# Patient Record
Sex: FEMALE | Race: WHITE | NOT HISPANIC OR LATINO | Employment: UNEMPLOYED | URBAN - METROPOLITAN AREA
[De-identification: names, ages, dates, MRNs, and addresses within clinical notes are randomized per-mention and may not be internally consistent; named-entity substitution may affect disease eponyms.]

---

## 2017-01-05 ENCOUNTER — OFFICE VISIT (OUTPATIENT)
Dept: PSYCHIATRY | Facility: CLINIC | Age: 67
End: 2017-01-05
Payer: COMMERCIAL

## 2017-01-05 VITALS
DIASTOLIC BLOOD PRESSURE: 60 MMHG | BODY MASS INDEX: 19.15 KG/M2 | SYSTOLIC BLOOD PRESSURE: 137 MMHG | WEIGHT: 122 LBS | HEIGHT: 67 IN | HEART RATE: 84 BPM

## 2017-01-05 DIAGNOSIS — F31.75 BIPOLAR I DISORDER, CURRENT OR MOST RECENT EPISODE DEPRESSED, IN PARTIAL REMISSION: ICD-10-CM

## 2017-01-05 PROCEDURE — 1159F MED LIST DOCD IN RCRD: CPT | Mod: S$GLB,,, | Performed by: PSYCHIATRY & NEUROLOGY

## 2017-01-05 PROCEDURE — 99999 PR PBB SHADOW E&M-EST. PATIENT-LVL III: CPT | Mod: PBBFAC,,, | Performed by: PSYCHIATRY & NEUROLOGY

## 2017-01-05 PROCEDURE — 99214 OFFICE O/P EST MOD 30 MIN: CPT | Mod: S$GLB,,, | Performed by: PSYCHIATRY & NEUROLOGY

## 2017-01-05 RX ORDER — LAMOTRIGINE 100 MG/1
100 TABLET ORAL DAILY
Qty: 30 TABLET | Refills: 5 | Status: SHIPPED | OUTPATIENT
Start: 2017-01-05 | End: 2017-01-26 | Stop reason: SDUPTHER

## 2017-01-05 RX ORDER — LAMOTRIGINE 25 MG/1
75 TABLET ORAL DAILY
Qty: 90 TABLET | Refills: 5 | Status: SHIPPED | OUTPATIENT
Start: 2017-01-05 | End: 2017-03-23

## 2017-01-05 RX ORDER — OLANZAPINE 5 MG/1
2.5-5 TABLET ORAL NIGHTLY
Qty: 30 TABLET | Refills: 5 | Status: SHIPPED | OUTPATIENT
Start: 2017-01-05 | End: 2017-03-23

## 2017-01-05 NOTE — MR AVS SNAPSHOT
[facility] - Psychiatry  [address]  [facility] [address]  Phone: [phone]  Fax: [phone]                  Martha Garner   2017 3:30 PM   Office Visit    Description:  Female : 1950   Provider:  Holley Smith MD   Department:  [facility] - Psychiatry           Diagnoses this Visit        Comments    Bipolar disorder, current episode mixed, moderate                To Do List           Goals (5 Years of Data)     None       These Medications        Disp Refills Start End    olanzapine (ZYPREXA) 5 MG tablet 30 tablet 5 2017    Take 0.5-1 tablets (2.5-5 mg total) by mouth every evening. - Oral    lamotrigine (LAMICTAL) 100 MG tablet 30 tablet 5 2017     Take 1 tablet (100 mg total) by mouth once daily. Take together with 25 mg tablets for total of 175 mg daily. - Oral    lamotrigine (LAMICTAL) 25 MG tablet 90 tablet 5 2017    Take 3 tablets (75 mg total) by mouth once daily. Take together with 100 mg tablet for total of 175 mg daily. - Oral      [facility]     [facility] On Call Nurse Care Line -  Assistance  Registered nurses in the [facility] On Call Center provide clinical advisement, health education, appointment booking, and other advisory services.  Call for this free service at [phone].             Medications           Message regarding Medications     Verify the changes and/or additions to your medication regime listed below are the same as discussed with your clinician today.  If any of these changes or additions are incorrect, please notify your healthcare provider.        START taking these NEW medications        Refills    lamotrigine (LAMICTAL) 25 MG tablet 5    Sig: Take 3 tablets (75 mg total) by mouth once daily. Take together with 100 mg tablet for total of 175 mg daily.    Class: Print    Route: Oral      CHANGE how you are taking these medications     Start Taking Instead of    olanzapine (ZYPREXA) 5 MG tablet  "olanzapine (ZYPREXA) 2.5 MG tablet    Dosage:  Take 0.5-1 tablets (2.5-5 mg total) by mouth every evening. Dosage:  Take 1 tablet (2.5 mg total) by mouth every evening. You may take additional 2.5 mg as needed for insomnia or anxiety    Reason for Change:  Reorder     lamotrigine (LAMICTAL) 100 MG tablet lamotrigine (LAMICTAL) 150 MG Tab    Dosage:  Take 1 tablet (100 mg total) by mouth once daily. Take together with 25 mg tablets for total of 175 mg daily. Dosage:  Take 1 tablet daily for 1 week, then increase to 1 1/2 tablets daily.    Reason for Change:  Reorder            Verify that the below list of medications is an accurate representation of the medications you are currently taking.  If none reported, the list may be blank. If incorrect, please contact your healthcare provider. Carry this list with you in case of emergency.           Current Medications     calcium citrate (CALCITRATE) 200 mg (950 mg) tablet Take 3 tablets (600 mg total) by mouth 3 (three) times daily with meals.    clonazePAM (KLONOPIN) 1 MG tablet Take 0.5-1 tablets (0.5-1 mg total) by mouth nightly as needed for Anxiety.    lamotrigine (LAMICTAL) 100 MG tablet Take 1 tablet (100 mg total) by mouth once daily. Take together with 25 mg tablets for total of 175 mg daily.    lamotrigine (LAMICTAL) 25 MG tablet Take 3 tablets (75 mg total) by mouth once daily. Take together with 100 mg tablet for total of 175 mg daily.    lithium (LITHOBID) 300 MG CR tablet Take 2 tablets (600 mg total) by mouth every evening.    olanzapine (ZYPREXA) 5 MG tablet Take 0.5-1 tablets (2.5-5 mg total) by mouth every evening.           Clinical Reference Information           Vital Signs - Last Recorded  Most recent update: 1/5/2017  3:33 PM by Mik Keith    BP Pulse Ht Wt BMI    137/60 84 5' 7" (1.702 m) 55.3 kg (122 lb) 19.11 kg/m2      Blood Pressure          Most Recent Value    BP  137/60      Allergies as of 1/5/2017     No Known Allergies      Immunizations " Administered on Date of Encounter - 1/5/2017     None      [employer] [employer] Sign-Up     Activating your [facility] account is as easy as 1-2-3!     1) Visit [email or web address], select Sign Up Now, enter this activation code and your date of birth, then select Next.  [email or web address]  Expires: 2/19/2017  4:05 PM      2) Create a username and password to use when you visit [facility] in the future and select a security question in case you lose your password and select Next.    3) Enter your e-mail address and click Sign Up!    Additional Information  If you have questions, please e-mail [email or web address] or call [phone] to talk to our [facility] staff. Remember, [facility] is NOT to be used for urgent needs. For medical emergencies, dial 911.         Instructions    1. Continue lamictal 175 mg daily, zyprexa 2.5-5 mg at bedtime, klonopin 0.5 mg at bedtime as needed, lithium 600 mg at bedtime.  2. Return for follow up on 1/26 at 2:30pm.

## 2017-01-05 NOTE — PROGRESS NOTES
Ambulatory Psychiatry Established Patient Follow-up Note      Chief Complaint  presents for followup of depression    Time Spent  30 minutes    People Present  Patient.     HPI  Feeling better. Mood has been good. Had some depression between alvina and new years, on her own increased lamictal to 175 mg daily and zyprexa to 5 mg at bedtime which helped substantially. Spoke with  during the appointment who confirms patient's report. STill has some OCD, but feels these are tolerable. Sleep had been poor last night, after dropping zyprexa back to 2.5 mg at bedtime. No stone or hypomania.     Past trials: risperdal ineffective, lexapro (caused mood lability)    ROS   Complete review of systems performed covering Constitutional, Eyes, ENT/Mouth, Cardiovascular, Respiratory, Gastrointestinal, Genitourinary, Musculoskeletal, Skin, Neurologic, Endocrine, and Allergy/Immune. Intermittent back pain. All other systems were negative.    Psych ROS covered elsewhere in note (HPI)    PFSH  Past Medical History reviewed: Yes  Family History reviewed: No  Social History reviewed: Yes  Medications/problem list/allergies reviewed: Yes    Medications  zyprexa 2.5 mg at bedtime.  Klonopin 0.5 mg qhs  Lithium 600 mg qhs  Lamictal 50 mg daily    Allergies  Review of patient's allergies indicates:  No Known Allergies    EXAM  VITALS     RELEVANT LABS/STUDIES:  Lithium level   Order: [id number]   Status:  Final result Visible to patient:  No (Not Released) Next appt:  None         Ref Range & Units 13d ago  (9/24/16) 3wk ago  (9/10/16) 1mo ago  (9/5/16) 1mo ago  (9/3/16)    Lithium Lvl 0.6 - 1.2 mmol/L 0.7 0.8 0.5 (L) 0.4 (L)   Resulting Agency  [facility]      Specimen Collected: 09/24/16  9:48 AM Last Resulted: 09/24/16 10:25 AM Lab Flowsheet Order Details View Encounter Lab and Collection Details Routing Result History           TSH   Order: [id number]   Status:  Final result Visible to patient:  No (Not Released) Next  appt:  None         Ref Range & Units 13d ago   1mo ago   5yr ago      TSH 0.400 - 4.000 uIU/mL 0.997 0.868 1.02R   Resulting Agency  [facility]      Specimen Collected: 09/24/16  2:05 PM Last Resulted: 09/24/16  3:13 PM Lab Flowsheet Order Details View Encounter Lab and Collection Details Routing Result              PTH, intact   Order: [id number]   Status:  Final result Visible to patient:  No (Not Released) Next appt:  None      Ref Range & Units 13d ago     PTH, Intact 9.0 - 77.0 pg/mL 148.0 (H)   Resulting Agency  [facility]      Specimen Collected: 09/24/16  2:05 PM Last Resulted: 09/24/16  2:58 PM Lab Flowsheet Order Details View Encounter Lab and Collection Details Routing Result History           CBC auto differential   Order: [id number]   Status:  Final result Visible to patient:  No (Not Released) Next appt:  None         Ref Range & Units 12d ago   13d ago   1mo ago   5yr ago      WBC 3.90 - 12.70 K/uL 11.70 15.14 (H) 6.52 4.68 (L)R    RBC 4.00 - 5.40 M/uL 4.00 4.57 4.51 4.22    Hemoglobin 12.0 - 16.0 g/dL 10.1 (L) 11.8 (L) 10.3 (L) 13.0R    Hematocrit 37.0 - 48.5 % 33.1 (L) 37.2 35.4 (L) 37.7    MCV 82 - 98 fL 83 81 (L) 79 (L) 89.3R    MCH 27.0 - 31.0 pg 25.3 (L) 25.8 (L) 22.8 (L) 30.8R    MCHC 32.0 - 36.0 % 30.5 (L) 31.7 (L) 29.1 (L) 34.5R    RDW 11.5 - 14.5 % 21.6 (H) 21.0 (H) 16.7 (H) 12.6    Platelets 150 - 350 K/uL 222 244 276 224    MPV 9.2 - 12.9 fL 10.4 10.1 9.4 10.2    Gran # 1.8 - 7.7 K/uL 9.7 (H) 13.6 (H) 4.6 1.9    Lymph # 1.0 - 4.8 K/uL 1.2 0.7 (L) 1.1 2.0R    Mono # 0.3 - 1.0 K/uL 0.6 0.7 0.6 0.4R    Eos # 0.0 - 0.5 K/uL 0.2 0.0 0.2 0.3R    Baso # 0.00 - 0.20 K/uL 0.03 0.02 0.02 0.0R    Gran% 38.0 - 73.0 % 82.8 (H) 90.1 (H) 70.1 41.4R    Lymph% 18.0 - 48.0 % 10.5 (L) 4.8 (L) 16.9 (L) 42.3R    Mono% 4.0 - 15.0 % 4.9 4.6 9.0 9.4 (H)R    Eosinophil% 0.0 - 8.0 % 1.3 0.1 3.5 6.0 (H)R    Basophil% 0.0 - 1.9 % 0.3 0.1 0.3 0.9R    Differential Method  Automated Automated Automated    Resulting  Agency  [facility]      Specimen Collected: 09/25/16  5:44 AM Last Resulted: 09/25/16  7:26 AM Lab Flowsheet Order Details View Encounter Lab and Collection Details Routing Result History      R=Reference range differs from displayed range             Basic metabolic panel   Order: [id number]   Status:  Final result Visible to patient:  No (Not Released) Next appt:  None         Ref Range & Units 12d ago   13d ago   3wk ago   1mo ago      Sodium 136 - 145 mmol/L 142 141 140 144    Potassium 3.5 - 5.1 mmol/L 3.7 4.0CM 4.1 4.3    Chloride 95 - 110 mmol/L 113 (H) 108 112 (H) 111 (H)    CO2 23 - 29 mmol/L 23 22 (L) 21 (L) 26    Glucose 70 - 110 mg/dL 113 (H) 126 (H) 177 (H) 98    BUN, Bld 8 - 23 mg/dL 15 18 11 20    Creatinine 0.5 - 1.4 mg/dL 0.7 0.8 0.8 0.7    Calcium 8.7 - 10.5 mg/dL 7.9 (L) 8.4 (L) 8.6 (L) 9.1    Anion Gap 8 - 16 mmol/L 6 (L) 11 7 (L) 7 (L)    eGFR if African American >60 mL/min/1.73 m^2 >60.0 >60.0 >60.0 >60.0    eGFR if non African American >60 mL/min/1.73 m^2 >60.0 >60.0CM >60.0CM >60.0CM   Comments: Calculation used to obtain the estimated glomerular filtration   rate (eGFR) is the CKD-EPI equation. Since race is unknown   in our information system, the eGFR values for   -American and Non--American patients are given   for each creatinine result.      Resulting Agency  [facility]      Specimen Collected: 09/25/16  5:44 AM Last Resulted: 09/25/16  6:22 AM Lab Flowsheet Order Details View Encounter Lab and Collection Details Routing Result History      CM=Additional comments               PSYCHIATRIC EXAMINATION  Appearance: well groomed, appearing healthy and of stated age    Behavior: cooperative, pleasant, no psychomotor retardation or agitation  Speech:normal rate, rhythm, volume and amount  Mood:good  Affect:brighter  Thought Process: linear and organized.  Thought Content: negative for delusions or suicidal ideations  Associations: intact  Memory: grossly  intact.  Level of Consciousness/Orientation: grossly intact  Fund of Knowledge: good  Attention: good  Language: fluent, naming intact  Insight: fair  Judgment: fair    Neurological signs: no involuntary movements or tremor  Gait: normal    Medical Decision Making    IMPRESSION   67 yo  retired woman with past psych hx significant for seasonal depressive episodes, anxiety and alcohol use disorder in sustained remission, off antidepressants for past several years, now presenting with one month hx of depressed mood associated with hyperreligious obsessions. Patient admitted to [facility] for treatment of major depressive episode with psychosis and had partial response to combination of lexapro 20 mg and zyprexa 7.5 mg at bedtime. Pt on follow up on 8/11 denied current hyperreligiousity or obsessiveness but still reports depression. Continued on lexapro 20 mg and increased dose of zyprexa 10 mg, pt returned less than one week later with several days of hyperactivity, severe insomnia and restlessness with euthymic mood, on exam affect bright and mildly expansive. On review of remote records, patient with periods of elevated mood possibly coincing with past episodes of substance use, prior diagnosis of bipolar disorder for periods of insomnia and hyperactivity. +Family hx (daughter with bipolar), as well as mixed features to prior depressions (delusions, hyperreligiousity, obsessiveness) consistent with bipolar II disorder. Then reduced lexapro to 10 mg, started on klonopin for sleep and continued zyprexa at 10 mg. Patient returned one week later on 8/25 with lower mood, variable energy but still appearing  mildly agitated, having incongruent affect and hyperreligious on exam. Of note pt had decreased zyprexa fromo 10 mg to 3.75 mg several days before due to concern for mild transaminitis and lower energy. Attempted crosstaper of zyprexa to risperdal but patient resumed zyprexa due to continued extremes mood swings and  insomnia. Continued to taper off lexapro. Patient returned 2 weeks ago dysphoric and suicidal as well as with psychomotor agitation, increased goal directed activity and hyperreligiousity. Concerned for risk to self given severity of despair, expressed desire to die, Worship conviction and agitation. Admitted to [facility], where sx stabilized with addition of lithium. Patient without any further suicidal ideation, delusions or mixed sx, but now reporting apathy and sustained depression, also complaining of some cognitive effects and blurry vision that she ties to increase in lithium dose. Reduced lithium from 750 mg to 600 mg while keeping other meds the same. Pt returned today reporting euthymia and fairly stable mood apart from some overshopping, but states this is achronic impulse not clearly related to mood. Continued patient on lithium 600 mg, zyprexa 10, lexapro 5 and klonopin 0.5-1 for 2 weeks. Per  patient has been stable, however patient reporting some dysphoria. Started patient on lamictal and tapering off zyprexa and lexapro, patient reporting good mood, denying depression or manic sx, appearing euthymic. However patient returned 2 weeks later in mid November with high anxiety, insomnia, suggestive of mixed hypomanic state given high level of activity, mild impulsivity and frequent good but labile mood. Started pateint back on zyprexa 2.5 mg qhs with good effect, calmer and euthymic but still with anxiety, obsessive thoughts and insomnia. Increased lamictal to target anxiety, pt increased to 75 mg only rather than 100 mg due to misunderstanding. Returned recently reporting depression more days than not and ruminative thinking about salvation. Increased lamictal further with resulting improvement in mood.     DIAGNOSES  Bipolar Disorder NOS, most recent episode mixed, in partial remission    PLAN  1. Continue lamictal 175 mg dailyfor bipolar depression. counsled about risk of rash.  2. Continue zyprexa  2.5-5  mg at bedtime for mood stabilization and insomnia. Consider transitioning to latuda or seroquel if still depressed and not responding to lamictal increases.  4. Continue lithium 600 mg qhs. Level in appropriate range. Consider discontinuation in the future  5. Continue klonopin 0.25-1 mg as needed for insomnia. Counseled not to take at same time as tramadol.  6. Continue therapy.  7. Return in 2 weeks     More than 50% of the time was spent on counseling and coordination of care.  Behavioral counseling, supportive therapy, coordination with  on care

## 2017-01-05 NOTE — PATIENT INSTRUCTIONS
1. Continue lamictal 175 mg daily, zyprexa 2.5-5 mg at bedtime, klonopin 0.5 mg at bedtime as needed, lithium 600 mg at bedtime.  2. Return for follow up on 1/26 at 2:30pm.

## 2017-01-07 PROBLEM — F31.62 BIPOLAR DISORDER, CURRENT EPISODE MIXED, MODERATE: Status: ACTIVE | Noted: 2017-01-07

## 2017-01-26 ENCOUNTER — OFFICE VISIT (OUTPATIENT)
Dept: PSYCHIATRY | Facility: CLINIC | Age: 67
End: 2017-01-26
Payer: COMMERCIAL

## 2017-01-26 VITALS
SYSTOLIC BLOOD PRESSURE: 142 MMHG | WEIGHT: 116 LBS | DIASTOLIC BLOOD PRESSURE: 83 MMHG | HEIGHT: 67 IN | BODY MASS INDEX: 18.21 KG/M2 | HEART RATE: 78 BPM

## 2017-01-26 DIAGNOSIS — F31.78 BIPOLAR I DISORDER, MOST RECENT EPISODE MIXED, IN FULL REMISSION: ICD-10-CM

## 2017-01-26 DIAGNOSIS — F31.75 BIPOLAR I DISORDER, CURRENT OR MOST RECENT EPISODE DEPRESSED, IN PARTIAL REMISSION: ICD-10-CM

## 2017-01-26 PROCEDURE — 1159F MED LIST DOCD IN RCRD: CPT | Mod: S$GLB,,, | Performed by: PSYCHIATRY & NEUROLOGY

## 2017-01-26 PROCEDURE — 99214 OFFICE O/P EST MOD 30 MIN: CPT | Mod: S$GLB,,, | Performed by: PSYCHIATRY & NEUROLOGY

## 2017-01-26 PROCEDURE — 99999 PR PBB SHADOW E&M-EST. PATIENT-LVL II: CPT | Mod: PBBFAC,,, | Performed by: PSYCHIATRY & NEUROLOGY

## 2017-01-26 RX ORDER — LAMOTRIGINE 200 MG/1
200 TABLET ORAL DAILY
Qty: 30 TABLET | Refills: 5 | Status: SHIPPED | OUTPATIENT
Start: 2017-01-26 | End: 2017-03-23

## 2017-01-26 RX ORDER — CLONAZEPAM 1 MG/1
.5-1 TABLET ORAL NIGHTLY PRN
Qty: 30 TABLET | Refills: 2 | Status: SHIPPED | OUTPATIENT
Start: 2017-01-26 | End: 2017-06-07 | Stop reason: SDUPTHER

## 2017-01-26 NOTE — MR AVS SNAPSHOT
[facility] - Psychiatry  [address]  [facility] [address]  Phone: [phone]  Fax: [phone]                  Martha Garner   2017 2:30 PM   Office Visit    Description:  Female : 1950   Provider:  Holley Smith MD   Department:  [facility] - Psychiatry           Diagnoses this Visit        Comments    Bipolar I disorder, current or most recent episode depressed, in partial remission         Bipolar I disorder, most recent episode mixed, in full remission                To Do List           Goals (5 Years of Data)     None       These Medications        Disp Refills Start End    lamotrigine (LAMICTAL) 200 MG tablet 30 tablet 5 2017     Take 1 tablet (200 mg total) by mouth once daily. Take together with 25 mg tablets for total of 225 mg daily. - Oral    Pharmacy: [facility] - [place] Ph #: [phone]       clonazePAM (KLONOPIN) 1 MG tablet 30 tablet 2 2017    Take 0.5-1 tablets (0.5-1 mg total) by mouth nightly as needed for Anxiety. - Oral    Pharmacy: [facility] - [address] Ph #: [phone]         [facility] On Call     [facility] On Call Nurse Care Line -  Assistance  Registered nurses in the [facility] Center provide clinical advisement, health education, appointment booking, and other advisory services.  Call for this free service at [phone].             Medications           Message regarding Medications     Verify the changes and/or additions to your medication regime listed below are the same as discussed with your clinician today.  If any of these changes or additions are incorrect, please notify your healthcare provider.        CHANGE how you are taking these medications     Start Taking Instead of    lamotrigine (LAMICTAL) 200 MG tablet lamotrigine (LAMICTAL) 100 MG tablet    Dosage:  Take 1 tablet (200 mg total) by mouth once daily.  "Take together with 25 mg tablets for total of 225 mg daily. Dosage:  Take 1 tablet (100 mg total) by mouth once daily. Take together with 25 mg tablets for total of 175 mg daily.    Reason for Change:  Reorder            Verify that the below list of medications is an accurate representation of the medications you are currently taking.  If none reported, the list may be blank. If incorrect, please contact your healthcare provider. Carry this list with you in case of emergency.           Current Medications     calcium citrate (CALCITRATE) 200 mg (950 mg) tablet Take 3 tablets (600 mg total) by mouth 3 (three) times daily with meals.    clonazePAM (KLONOPIN) 1 MG tablet Take 0.5-1 tablets (0.5-1 mg total) by mouth nightly as needed for Anxiety.    lamotrigine (LAMICTAL) 200 MG tablet Take 1 tablet (200 mg total) by mouth once daily. Take together with 25 mg tablets for total of 225 mg daily.    lamotrigine (LAMICTAL) 25 MG tablet Take 3 tablets (75 mg total) by mouth once daily. Take together with 100 mg tablet for total of 175 mg daily.    lithium (LITHOBID) 300 MG CR tablet Take 2 tablets (600 mg total) by mouth every evening.    olanzapine (ZYPREXA) 5 MG tablet Take 0.5-1 tablets (2.5-5 mg total) by mouth every evening.           Clinical Reference Information           Vital Signs - Last Recorded  Most recent update: 1/26/2017  2:31 PM by Ramon Matthew MA    BP Pulse Ht Wt BMI    (!) 142/83 78 5' 7" (1.702 m) 52.6 kg (116 lb) 18.17 kg/m2      Blood Pressure          Most Recent Value    BP  (!)  142/83      Allergies as of 1/26/2017     No Known Allergies      Immunizations Administered on Date of Encounter - 1/26/2017     None      [facility] Sign-Up     Activating your [facility] account is as easy as 1-2-3!     1) Visit [email or web address], select Sign Up Now, enter this activation code and your date of birth, then select Next.  [email or web address]  Expires: 2/19/2017  4:05 PM      2) Create a username and password " to use when you visit [facility] in the future and select a security question in case you lose your password and select Next.    3) Enter your e-mail address and click Sign Up!    Additional Information  If you have questions, please e-mail [email or web address] or call [phone] to talk to our [email or web address] staff. Remember, [facility] is NOT to be used for urgent needs. For medical emergencies, dial 911.         Instructions    1. Continue current medications.  2. Return on 2/21 at 2:30pm.

## 2017-01-26 NOTE — PROGRESS NOTES
Ambulatory Psychiatry Established Patient Follow-up Note      Chief Complaint  presents for followup of depression    Time Spent  30 minutes    People Present  Patient.     HPI  Had some dips in mood last few weeks lasting about 5 days each, with racing thoughts, not sleeping, dysphoria. During this time took extra of the zyprexa, up to 10 mg at bedtime and titrated lamictal up to 225 mg. For past week mood has been stable despite the fact that her mother entered hospice and passed away this morning. Has been obsessing over details related to  and reports anxiety, however patient and  agree this is her baseline and that mood appears to be stable. Now back to taking zyprexa 5 mg in evening. Also takes klonopin 0.5 mg at bedtime,  from zyprexa from a few hours, sleeping well.        ROS   Complete review of systems performed covering Constitutional, Eyes, ENT/Mouth, Cardiovascular, Respiratory, Gastrointestinal, Genitourinary, Musculoskeletal, Skin, Neurologic, Endocrine, and Allergy/Immune. Intermittent back pain. All other systems were negative.    Psych ROS covered elsewhere in note (HPI)    PFSH  Past Medical History reviewed: Yes  Family History reviewed: No  Social History reviewed: Yes  Medications/problem list/allergies reviewed: Yes    Medications  zyprexa 2.5-5 mg at bedtime.  Klonopin 0.5 mg qhs  Lithium 600 mg qhs  Lamictal 225 mg daily    Allergies  Review of patient's allergies indicates:  No Known Allergies    EXAM  VITALS     RELEVANT LABS/STUDIES:  Lithium level   Order: [id number]   Status:  Final result Visible to patient:  No (Not Released) Next appt:  None         Ref Range & Units 13d ago  (16) 3wk ago  (9/10/16) 1mo ago  (16) 1mo ago  (9/3/16)    Lithium Lvl 0.6 - 1.2 mmol/L 0.7 0.8 0.5 (L) 0.4 (L)   Resulting Agency  [facility]      Specimen Collected: 16  9:48 AM Last Resulted: 16 10:25 AM Lab Flowsheet Order Details View Encounter Lab and  Collection Details Routing Result History           TSH   Order: [id number]   Status:  Final result Visible to patient:  No (Not Released) Next appt:  None         Ref Range & Units 13d ago   1mo ago   5yr ago      TSH 0.400 - 4.000 uIU/mL 0.997 0.868 1.02R   Resulting Agency  [facility]      Specimen Collected: 09/24/16  2:05 PM Last Resulted: 09/24/16  3:13 PM Lab Flowsheet Order Details View Encounter Lab and Collection Details Routing Result              PTH, intact   Order: [id number]   Status:  Final result Visible to patient:  No (Not Released) Next appt:  None      Ref Range & Units 13d ago     PTH, Intact 9.0 - 77.0 pg/mL 148.0 (H)   Resulting Agency  [facility]      Specimen Collected: 09/24/16  2:05 PM Last Resulted: 09/24/16  2:58 PM Lab Flowsheet Order Details View Encounter Lab and Collection Details Routing Result History           CBC auto differential   Order: [id number]   Status:  Final result Visible to patient:  No (Not Released) Next appt:  None         Ref Range & Units 12d ago   13d ago   1mo ago   5yr ago      WBC 3.90 - 12.70 K/uL 11.70 15.14 (H) 6.52 4.68 (L)R    RBC 4.00 - 5.40 M/uL 4.00 4.57 4.51 4.22    Hemoglobin 12.0 - 16.0 g/dL 10.1 (L) 11.8 (L) 10.3 (L) 13.0R    Hematocrit 37.0 - 48.5 % 33.1 (L) 37.2 35.4 (L) 37.7    MCV 82 - 98 fL 83 81 (L) 79 (L) 89.3R    MCH 27.0 - 31.0 pg 25.3 (L) 25.8 (L) 22.8 (L) 30.8R    MCHC 32.0 - 36.0 % 30.5 (L) 31.7 (L) 29.1 (L) 34.5R    RDW 11.5 - 14.5 % 21.6 (H) 21.0 (H) 16.7 (H) 12.6    Platelets 150 - 350 K/uL 222 244 276 224    MPV 9.2 - 12.9 fL 10.4 10.1 9.4 10.2    Gran # 1.8 - 7.7 K/uL 9.7 (H) 13.6 (H) 4.6 1.9    Lymph # 1.0 - 4.8 K/uL 1.2 0.7 (L) 1.1 2.0R    Mono # 0.3 - 1.0 K/uL 0.6 0.7 0.6 0.4R    Eos # 0.0 - 0.5 K/uL 0.2 0.0 0.2 0.3R    Baso # 0.00 - 0.20 K/uL 0.03 0.02 0.02 0.0R    Gran% 38.0 - 73.0 % 82.8 (H) 90.1 (H) 70.1 41.4R    Lymph% 18.0 - 48.0 % 10.5 (L) 4.8 (L) 16.9 (L) 42.3R    Mono% 4.0 - 15.0 % 4.9 4.6 9.0 9.4 (H)R    Eosinophil% 0.0  - 8.0 % 1.3 0.1 3.5 6.0 (H)R    Basophil% 0.0 - 1.9 % 0.3 0.1 0.3 0.9R    Differential Method  Automated Automated Automated    Resulting Agency  [facility]      Specimen Collected: 09/25/16  5:44 AM Last Resulted: 09/25/16  7:26 AM Lab Flowsheet Order Details View Encounter Lab and Collection Details Routing Result History      R=Reference range differs from displayed range             Basic metabolic panel   Order: [id number]   Status:  Final result Visible to patient:  No (Not Released) Next appt:  None         Ref Range & Units 12d ago   13d ago   3wk ago   1mo ago      Sodium 136 - 145 mmol/L 142 141 140 144    Potassium 3.5 - 5.1 mmol/L 3.7 4.0CM 4.1 4.3    Chloride 95 - 110 mmol/L 113 (H) 108 112 (H) 111 (H)    CO2 23 - 29 mmol/L 23 22 (L) 21 (L) 26    Glucose 70 - 110 mg/dL 113 (H) 126 (H) 177 (H) 98    BUN, Bld 8 - 23 mg/dL 15 18 11 20    Creatinine 0.5 - 1.4 mg/dL 0.7 0.8 0.8 0.7    Calcium 8.7 - 10.5 mg/dL 7.9 (L) 8.4 (L) 8.6 (L) 9.1    Anion Gap 8 - 16 mmol/L 6 (L) 11 7 (L) 7 (L)    eGFR if African American >60 mL/min/1.73 m^2 >60.0 >60.0 >60.0 >60.0    eGFR if non African American >60 mL/min/1.73 m^2 >60.0 >60.0CM >60.0CM >60.0CM   Comments: Calculation used to obtain the estimated glomerular filtration   rate (eGFR) is the CKD-EPI equation. Since race is unknown   in our information system, the eGFR values for   -American and Non--American patients are given   for each creatinine result.      Resulting Agency  [facility]      Specimen Collected: 09/25/16  5:44 AM Last Resulted: 09/25/16  6:22 AM Lab Flowsheet Order Details View Encounter Lab and Collection Details Routing Result History      CM=Additional comments               PSYCHIATRIC EXAMINATION  Appearance: well groomed, appearing healthy and of stated age    Behavior: cooperative, pleasant, no psychomotor retardation or agitation  Speech:normal rate, rhythm, volume and amount  Mood:anxious  Affect:congruent but  otherwise normal range  Thought Process: linear and organized.  Thought Content: negative for delusions or suicidal ideations  Associations: intact  Memory: grossly intact.  Level of Consciousness/Orientation: grossly intact  Fund of Knowledge: good  Attention: good  Language: fluent, naming intact  Insight: fair  Judgment: fair    Neurological signs: no involuntary movements or tremor  Gait: normal    Medical Decision Making    IMPRESSION   65 yo  retired woman with past psych hx significant for seasonal depressive episodes, anxiety and alcohol use disorder in sustained remission, off antidepressants for past several years, now presenting with one month hx of depressed mood associated with hyperreligious obsessions. Patient admitted to [facility] for treatment of major depressive episode with psychosis and had partial response to combination of lexapro 20 mg and zyprexa 7.5 mg at bedtime. Pt on follow up on 8/11 denied current hyperreligiousity or obsessiveness but still reports depression. Continued on lexapro 20 mg and increased dose of zyprexa 10 mg, pt returned less than one week later with several days of hyperactivity, severe insomnia and restlessness with euthymic mood, on exam affect bright and mildly expansive. On review of remote records, patient with periods of elevated mood possibly coincing with past episodes of substance use, prior diagnosis of bipolar disorder for periods of insomnia and hyperactivity. +Family hx (daughter with bipolar), as well as mixed features to prior depressions (delusions, hyperreligiousity, obsessiveness) consistent with bipolar II disorder. Then reduced lexapro to 10 mg, started on klonopin for sleep and continued zyprexa at 10 mg. Patient returned one week later on 8/25 with lower mood, variable energy but still appearing  mildly agitated, having incongruent affect and hyperreligious on exam. Of note pt had decreased zyprexa fromo 10 mg to 3.75 mg several days before due to  concern for mild transaminitis and lower energy. Attempted crosstaper of zyprexa to risperdal but patient resumed zyprexa due to continued extremes mood swings and insomnia. Continued to taper off lexapro. Patient returned 2 weeks ago dysphoric and suicidal as well as with psychomotor agitation, increased goal directed activity and hyperreligiousity. Concerned for risk to self given severity of despair, expressed desire to die, Tenriism conviction and agitation. Admitted to [facility], where sx stabilized with addition of lithium. Patient without any further suicidal ideation, delusions or mixed sx, but now reporting apathy and sustained depression, also complaining of some cognitive effects and blurry vision that she ties to increase in lithium dose. Reduced lithium from 750 mg to 600 mg while keeping other meds the same. Pt returned today reporting euthymia and fairly stable mood apart from some overshopping, but states this is achronic impulse not clearly related to mood. Continued patient on lithium 600 mg, zyprexa 10, lexapro 5 and klonopin 0.5-1 for 2 weeks. Per  patient has been stable, however patient reporting some dysphoria. Started patient on lamictal and tapering off zyprexa and lexapro, patient reporting good mood, denying depression or manic sx, appearing euthymic. However patient returned 2 weeks later in mid November with high anxiety, insomnia, suggestive of mixed hypomanic state given high level of activity, mild impulsivity and frequent good but labile mood. Started pateint back on zyprexa 2.5 mg qhs with good effect, calmer and euthymic but still with anxiety, obsessive thoughts and insomnia. Increased lamictal to target anxiety, pt increased to 75 mg only rather than 100 mg due to misunderstanding. Has been mostly stable but with periodic breakthroughs of insomnia, depressed mood and racing thoughts which have responded to lamictal increases and temporary increases in zyprexa. Pt today returns  reporting anxiety over death of her mother and  arrangements but otherwise stable mood.     DIAGNOSES  Bipolar Disorder NOS, most recent episode mixed, in partial remission    PLAN  1. Continue lamictal 175 mg dailyfor bipolar depression. counsled about risk of rash.  2. Continue zyprexa 2.5-5  mg at bedtime for mood stabilization and insomnia. Consider transitioning to latuda or seroquel if still depressed and not responding to lamictal increases.  4. Continue lithium 600 mg qhs. Level in appropriate range. Consider discontinuation in the future or switch to trileptal.  5. Continue klonopin 0.25-1 mg as needed for insomnia. Counseled not to take at same time as tramadol or zyprexa.  6. Continue therapy.  7. Return in 4 weeks     More than 50% of the time was spent on counseling and coordination of care.  Behavioral counseling, supportive therapy, coordination with  on care

## 2017-02-22 ENCOUNTER — OFFICE VISIT (OUTPATIENT)
Dept: PSYCHIATRY | Facility: CLINIC | Age: 67
End: 2017-02-22
Payer: COMMERCIAL

## 2017-02-22 VITALS
DIASTOLIC BLOOD PRESSURE: 68 MMHG | HEART RATE: 100 BPM | BODY MASS INDEX: 18.35 KG/M2 | SYSTOLIC BLOOD PRESSURE: 142 MMHG | HEIGHT: 67 IN | WEIGHT: 116.88 LBS

## 2017-02-22 DIAGNOSIS — F31.70 BIPOLAR DISORDER IN PARTIAL REMISSION, MOST RECENT EPISODE UNSPECIFIED TYPE: Primary | Chronic | ICD-10-CM

## 2017-02-22 PROCEDURE — 99214 OFFICE O/P EST MOD 30 MIN: CPT | Mod: S$GLB,,, | Performed by: PSYCHIATRY & NEUROLOGY

## 2017-02-22 PROCEDURE — 99999 PR PBB SHADOW E&M-EST. PATIENT-LVL II: CPT | Mod: PBBFAC,,, | Performed by: PSYCHIATRY & NEUROLOGY

## 2017-02-22 NOTE — PROGRESS NOTES
Ambulatory Psychiatry Established Patient Follow-up Note      Chief Complaint  presents for followup of depression    Time Spent  35 minutes    People Present  Patient, spoke with  on the phone    HPI  Patient reports mostly good mood, did have periods of despondency and negativity with Temple ruminations after listening for prlonged period to Yazdanism sermons with dark themes.  temporarily increased zyprexa to 7.5 mg with good effect. Mood good for the past few days. Still with anxiety but its manageable. Sleeping well with klonopin 0.5 mg at bedtime.  says she is mostly doing well but still has episodes of negtive spirals of Temple anxiety and depressed mood that are short lived and respond to temporary increases in zyprexa. Also expressed concern that there is some mild lability with overactivity at times    ROS   Complete review of systems performed covering Constitutional, Eyes, ENT/Mouth, Cardiovascular, Respiratory, Gastrointestinal, Genitourinary, Musculoskeletal, Skin, Neurologic, Endocrine, and Allergy/Immune. Intermittent back pain. All other systems were negative.    Psych ROS covered elsewhere in note (HPI)    PFSH  Past Medical History reviewed: Yes  Family History reviewed: No  Social History reviewed: Yes  Medications/problem list/allergies reviewed: Yes    Medications  zyprexa 2.5-5 mg at bedtime.  Klonopin 0.5 mg qhs  Lithium 600 mg qhs  Lamictal 225 mg daily    Allergies  Review of patient's allergies indicates:  No Known Allergies    EXAM  VITALS     RELEVANT LABS/STUDIES:  Lithium level   Order: [id number]   Status:  Final result Visible to patient:  No (Not Released) Next appt:  None         Ref Range & Units 13d ago  (9/24/16) 3wk ago  (9/10/16) 1mo ago  (9/5/16) 1mo ago  (9/3/16)    Lithium Lvl 0.6 - 1.2 mmol/L 0.7 0.8 0.5 (L) 0.4 (L)   [facility]ing Agency  [facility]      Specimen Collected: 09/24/16  9:48 AM Last Resulted: 09/24/16 10:25 AM Lab Flowsheet  Order Details View Encounter Lab and Collection Details Routing Result History           TSH   Order: [id number]   Status:  Final result Visible to patient:  No (Not Released) Next appt:  None         Ref Range & Units 13d ago   1mo ago   5yr ago      TSH 0.400 - 4.000 uIU/mL 0.997 0.868 1.02R   Resulting Agency  [facility]      Specimen Collected: 09/24/16  2:05 PM Last Resulted: 09/24/16  3:13 PM Lab Flowsheet Order Details View Encounter Lab and Collection Details Routing Result              PTH, intact   Order: [id number]   Status:  Final result Visible to patient:  No (Not Released) Next appt:  None      Ref Range & Units 13d ago     PTH, Intact 9.0 - 77.0 pg/mL 148.0 (H)   Resulting Agency  [facility]      Specimen Collected: 09/24/16  2:05 PM Last Resulted: 09/24/16  2:58 PM Lab Flowsheet Order Details View Encounter Lab and Collection Details Routing Result History           CBC auto differential   Order: [id number]   Status:  Final result Visible to patient:  No (Not Released) Next appt:  None         Ref Range & Units 12d ago   13d ago   1mo ago   5yr ago      WBC 3.90 - 12.70 K/uL 11.70 15.14 (H) 6.52 4.68 (L)R    RBC 4.00 - 5.40 M/uL 4.00 4.57 4.51 4.22    Hemoglobin 12.0 - 16.0 g/dL 10.1 (L) 11.8 (L) 10.3 (L) 13.0R    Hematocrit 37.0 - 48.5 % 33.1 (L) 37.2 35.4 (L) 37.7    MCV 82 - 98 fL 83 81 (L) 79 (L) 89.3R    MCH 27.0 - 31.0 pg 25.3 (L) 25.8 (L) 22.8 (L) 30.8R    MCHC 32.0 - 36.0 % 30.5 (L) 31.7 (L) 29.1 (L) 34.5R    RDW 11.5 - 14.5 % 21.6 (H) 21.0 (H) 16.7 (H) 12.6    Platelets 150 - 350 K/uL 222 244 276 224    MPV 9.2 - 12.9 fL 10.4 10.1 9.4 10.2    Gran # 1.8 - 7.7 K/uL 9.7 (H) 13.6 (H) 4.6 1.9    Lymph # 1.0 - 4.8 K/uL 1.2 0.7 (L) 1.1 2.0R    Mono # 0.3 - 1.0 K/uL 0.6 0.7 0.6 0.4R    Eos # 0.0 - 0.5 K/uL 0.2 0.0 0.2 0.3R    Baso # 0.00 - 0.20 K/uL 0.03 0.02 0.02 0.0R    Gran% 38.0 - 73.0 % 82.8 (H) 90.1 (H) 70.1 41.4R    Lymph% 18.0 - 48.0 % 10.5 (L) 4.8 (L) 16.9 (L) 42.3R    Mono% 4.0 - 15.0 % 4.9  4.6 9.0 9.4 (H)R    Eosinophil% 0.0 - 8.0 % 1.3 0.1 3.5 6.0 (H)R    Basophil% 0.0 - 1.9 % 0.3 0.1 0.3 0.9R    Differential Method  Automated Automated Automated    Resulting Agency  [facility]      Specimen Collected: 09/25/16  5:44 AM Last Resulted: 09/25/16  7:26 AM Lab Flowsheet Order Details View Encounter Lab and Collection Details Routing Result History      R=Reference range differs from displayed range             Basic metabolic panel   Order: [id number]   Status:  Final result Visible to patient:  No (Not Released) Next appt:  None         Ref Range & Units 12d ago   13d ago   3wk ago   1mo ago      Sodium 136 - 145 mmol/L 142 141 140 144    Potassium 3.5 - 5.1 mmol/L 3.7 4.0CM 4.1 4.3    Chloride 95 - 110 mmol/L 113 (H) 108 112 (H) 111 (H)    CO2 23 - 29 mmol/L 23 22 (L) 21 (L) 26    Glucose 70 - 110 mg/dL 113 (H) 126 (H) 177 (H) 98    BUN, Bld 8 - 23 mg/dL 15 18 11 20    Creatinine 0.5 - 1.4 mg/dL 0.7 0.8 0.8 0.7    Calcium 8.7 - 10.5 mg/dL 7.9 (L) 8.4 (L) 8.6 (L) 9.1    Anion Gap 8 - 16 mmol/L 6 (L) 11 7 (L) 7 (L)    eGFR if African American >60 mL/min/1.73 m^2 >60.0 >60.0 >60.0 >60.0    eGFR if non African American >60 mL/min/1.73 m^2 >60.0 >60.0CM >60.0CM >60.0CM   Comments: Calculation used to obtain the estimated glomerular filtration   rate (eGFR) is the CKD-EPI equation. Since race is unknown   in our information system, the eGFR values for   -American and Non--American patients are given   for each creatinine result.      Resulting Agency  [facility]      Specimen Collected: 09/25/16  5:44 AM Last Resulted: 09/25/16  6:22 AM Lab Flowsheet Order Details View Encounter Lab and Collection Details Routing Result History      CM=Additional comments               PSYCHIATRIC EXAMINATION  Appearance: well groomed, appearing healthy and of stated age, thin    Behavior: cooperative, pleasant, no psychomotor retardation or agitation  Speech:normal rate, rhythm, volume and  amount  Mood:anxious  Affect:congruent but otherwise normal range  Thought Process: linear and organized.  Thought Content: negative for delusions or suicidal ideations or hallucintaiton. Gnosticist ruminations  Associations: intact  Memory: grossly intact.  Level of Consciousness/Orientation: grossly intact  Fund of Knowledge: good  Attention: good  Language: fluent, naming intact  Insight: fair  Judgment: fair    Neurological signs: no involuntary movements or tremor  Gait: normal    Medical Decision Making    IMPRESSION   65 yo  retired woman with past psych hx significant for seasonal depressive episodes, anxiety and alcohol use disorder in sustained remission, off antidepressants for past several years, now presenting with one month hx of depressed mood associated with hyperreligious obsessions. Patient admitted to [facility] for treatment of major depressive episode with psychosis and had partial response to combination of lexapro 20 mg and zyprexa 7.5 mg at bedtime. Pt on follow up on 8/11 denied current hyperreligiousity or obsessiveness but still reports depression. Continued on lexapro 20 mg and increased dose of zyprexa 10 mg, pt returned less than one week later with several days of hyperactivity, severe insomnia and restlessness with euthymic mood, on exam affect bright and mildly expansive. On review of remote records, patient with periods of elevated mood possibly coincing with past episodes of substance use, prior diagnosis of bipolar disorder for periods of insomnia and hyperactivity. +Family hx (daughter with bipolar), as well as mixed features to prior depressions (delusions, hyperreligiousity, obsessiveness) consistent with bipolar II disorder. Then reduced lexapro to 10 mg, started on klonopin for sleep and continued zyprexa at 10 mg. Patient returned one week later on 8/25 with lower mood, variable energy but still appearing  mildly agitated, having incongruent affect and hyperreligious on exam.  Of note pt had decreased zyprexa fromo 10 mg to 3.75 mg several days before due to concern for mild transaminitis and lower energy. Attempted crosstaper of zyprexa to risperdal but patient resumed zyprexa due to continued extremes mood swings and insomnia. Continued to taper off lexapro. Patient returned 2 weeks ago dysphoric and suicidal as well as with psychomotor agitation, increased goal directed activity and hyperreligiousity. Concerned for risk to self given severity of despair, expressed desire to die, Restoration conviction and agitation. Admitted to [facility], where sx stabilized with addition of lithium. Patient without any further suicidal ideation, delusions or mixed sx, but now reporting apathy and sustained depression, also complaining of some cognitive effects and blurry vision that she ties to increase in lithium dose. Reduced lithium from 750 mg to 600 mg while keeping other meds the same. Pt returned today reporting euthymia and fairly stable mood apart from some overshopping, but states this is achronic impulse not clearly related to mood. Continued patient on lithium 600 mg, zyprexa 10, lexapro 5 and klonopin 0.5-1 for 2 weeks. Per  patient has been stable, however patient reporting some dysphoria. Started patient on lamictal and tapering off zyprexa and lexapro, patient reporting good mood, denying depression or manic sx, appearing euthymic. However patient returned 2 weeks later in mid November with high anxiety, insomnia, suggestive of mixed hypomanic state given high level of activity, mild impulsivity and frequent good but labile mood. Started pateint back on zyprexa 2.5 mg qhs with good effect, calmer and euthymic but still with anxiety, obsessive thoughts and insomnia. Increased lamictal to target anxiety, pt increased to 75 mg only rather than 100 mg due to misunderstanding. Has been mostly stable but with periodic breakthroughs of insomnia, depressed mood and racing thoughts which have  responded to lamictal increases and temporary increases in zyprexa.    DIAGNOSES  Bipolar Disorder NOS, most recent episode mixed, in partial remission    PLAN  1. Continue lamictal 175 mg dailyfor bipolar depression. counsled about risk of rash.  2. Continue zyprexa 2.5-5  mg at bedtime for mood stabilization and insomnia. Consider transitioning to latuda or seroquel if still depressed and not responding to lamictal increases.  4. Continue lithium 600 mg qhs. Level in appropriate range. Consider discontinuation in the future or switch to trileptal.  5. Continue klonopin 0.25-1 mg as needed for insomnia. Counseled not to take at same time as tramadol or zyprexa.  6. Continue therapy.  7. Return in 4 weeks     More than 50% of the time was spent on counseling and coordination of care.  Behavioral counseling, supportive therapy, coordination with  on care

## 2017-02-22 NOTE — MR AVS SNAPSHOT
[facility]  [address]  [place] [address]  Phone: [phone]  Fax: [phone]                  Martha Garner   2017 9:00 AM   Office Visit    Description:  Female : 1950   Provider:  Holley Smith MD   [facility]                To Do List           Goals (5 Years of Data)     None      [facility]     [facility] On Call Nurse Care Line -  Assistance  Registered nurses in the [facility] On Call Center provide clinical advisement, health education, appointment booking, and other advisory services.  Call for this free service at [phone].             Medications           Message regarding Medications     Verify the changes and/or additions to your medication regime listed below are the same as discussed with your clinician today.  If any of these changes or additions are incorrect, please notify your healthcare provider.             Verify that the below list of medications is an accurate representation of the medications you are currently taking.  If none reported, the list may be blank. If incorrect, please contact your healthcare provider. Carry this list with you in case of emergency.           Current Medications     calcium citrate (CALCITRATE) 200 mg (950 mg) tablet Take 3 tablets (600 mg total) by mouth 3 (three) times daily with meals.    clonazePAM (KLONOPIN) 1 MG tablet Take 0.5-1 tablets (0.5-1 mg total) by mouth nightly as needed for Anxiety.    lamotrigine (LAMICTAL) 200 MG tablet Take 1 tablet (200 mg total) by mouth once daily. Take together with 25 mg tablets for total of 225 mg daily.    lamotrigine (LAMICTAL) 25 MG tablet Take 3 tablets (75 mg total) by mouth once daily. Take together with 100 mg tablet for total of 175 mg daily.    lithium (LITHOBID) 300 MG CR tablet Take 2 tablets (600 mg total) by mouth every evening.    olanzapine (ZYPREXA) 5 MG tablet Take 0.5-1 tablets (2.5-5 mg total) by mouth every evening.  "          Clinical Reference Information           Your Vitals Were     BP Pulse Height Weight BMI    142/68 100 5' 7" (1.702 m) 53 kg (116 lb 13.5 oz) 18.3 kg/m2      Blood Pressure          Most Recent Value    BP  (!)  142/68      Allergies as of 2/22/2017     No Known Allergies      Immunizations Administered on Date of Encounter - 2/22/2017     None      [facility] Sign-Up     Activating your [facility] account is as easy as 1-2-3!     1) Visit [email or web address], select Sign Up Now, enter this activation code and your date of birth, then select Next.  [email or web address]  Expires: 4/8/2017  9:47 AM      2) Create a username and password to use when you visit [facility] in the future and select a security question in case you lose your password and select Next.    3) Enter your e-mail address and click Sign Up!    Additional Information  If you have questions, please e-mail [email or web address] or call [phone] to talk to our [facility] staff. Remember, [facility] is NOT to be used for urgent needs. For medical emergencies, dial 911.         Instructions    1. Continue current medications.  2. Recommending checking iron levels and repleting if needed, restless legs may be due to iron deficiency anemia. Blackstrap molasses, lean red meat, leafy greens with tomatoes or mario and use cast iron pots and pans.  3. Listen to more uplifting Moravian programs.  4. Return on march 23rd at 2:30pm, bring your .       Language Assistance Services     ATTENTION: Language assistance services are available, free of charge. Please call [phone].      ATENCIÓN: Si habla español, tiene a bob disposición servicios gratuitos de asistencia lingüística. Llame al [phone].     ANTONY Ý: N?u b?n nói Ti?ng Vi?t, có các d?ch v? h? tr? ngôn ng? mi?n phí dành cho b?n. G?i s? [phone].         [facility] complies with applicable Federal civil rights laws and does not discriminate on the basis of race, color, " national origin, age, disability, or sex.

## 2017-02-22 NOTE — PATIENT INSTRUCTIONS
1. Continue current medications.  2. Recommending checking iron levels and repleting if needed, restless legs may be due to iron deficiency anemia. Blackstrap molasses, lean red meat, leafy greens with tomatoes or mario and use cast iron pots and pans.  3. Listen to more uplifting Zoroastrianism programs.  4. Return on march 23rd at 2:30pm, bring your .

## 2017-03-23 ENCOUNTER — OFFICE VISIT (OUTPATIENT)
Dept: PSYCHIATRY | Facility: CLINIC | Age: 67
End: 2017-03-23
Payer: COMMERCIAL

## 2017-03-23 VITALS
BODY MASS INDEX: 18.05 KG/M2 | SYSTOLIC BLOOD PRESSURE: 131 MMHG | HEART RATE: 84 BPM | DIASTOLIC BLOOD PRESSURE: 63 MMHG | WEIGHT: 115 LBS | HEIGHT: 67 IN

## 2017-03-23 DIAGNOSIS — F31.62 BIPOLAR DISORDER, CURRENT EPISODE MIXED, MODERATE: Primary | ICD-10-CM

## 2017-03-23 PROCEDURE — 99215 OFFICE O/P EST HI 40 MIN: CPT | Mod: S$GLB,,, | Performed by: PSYCHIATRY & NEUROLOGY

## 2017-03-23 PROCEDURE — 99999 PR PBB SHADOW E&M-EST. PATIENT-LVL III: CPT | Mod: PBBFAC,,, | Performed by: PSYCHIATRY & NEUROLOGY

## 2017-03-23 RX ORDER — OLANZAPINE 5 MG/1
7.5 TABLET ORAL NIGHTLY
Qty: 45 TABLET | Refills: 5 | Status: SHIPPED | OUTPATIENT
Start: 2017-03-23 | End: 2017-05-01 | Stop reason: SDUPTHER

## 2017-03-23 RX ORDER — LAMOTRIGINE 100 MG/1
250 TABLET ORAL DAILY
Qty: 75 TABLET | Refills: 5 | Status: SHIPPED | OUTPATIENT
Start: 2017-03-23 | End: 2017-05-01 | Stop reason: SDUPTHER

## 2017-03-23 RX ORDER — LITHIUM CARBONATE 450 MG/1
450 TABLET ORAL NIGHTLY
Qty: 30 TABLET | Refills: 5 | Status: SHIPPED | OUTPATIENT
Start: 2017-03-23 | End: 2017-06-07 | Stop reason: SDUPTHER

## 2017-03-23 NOTE — MR AVS SNAPSHOT
[facility] - Psychiatry  [address]  [facility] [address]  Phone: [phone]  Fax: [phone]                  Martha Garner   3/23/2017 2:30 PM   Office Visit    Description:  Female : 1950   Provider:  Holley Smith MD   Department:  [facility] - Psychiatry           Diagnoses this Visit        Comments    Bipolar disorder, current episode mixed, moderate    -  Primary            To Do List           Goals (5 Years of Data)     None       These Medications        Disp Refills Start End    olanzapine (ZYPREXA) 5 MG tablet 45 tablet 5 3/23/2017 3/23/2018    Take 1.5 tablets (7.5 mg total) by mouth every evening. - Oral    lamotrigine (LAMICTAL) 100 MG tablet 75 tablet 5 3/23/2017 3/23/2018    Take 2.5 tablets (250 mg total) by mouth once daily. - Oral    lithium (ESKALITH) 450 MG TbSR 30 tablet 5 3/23/2017 3/23/2018    Take 1 tablet (450 mg total) by mouth every evening. - Oral      [facility]     [facility] On Call Nurse Care Line -  Assistance  Registered nurses in the [facility] Center provide clinical advisement, health education, appointment booking, and other advisory services.  Call for this free service at [phone].             Medications           Message regarding Medications     Verify the changes and/or additions to your medication regime listed below are the same as discussed with your clinician today.  If any of these changes or additions are incorrect, please notify your healthcare provider.        START taking these NEW medications        Refills    olanzapine (ZYPREXA) 5 MG tablet 5    Sig: Take 1.5 tablets (7.5 mg total) by mouth every evening.    Class: Print    Route: Oral    lamotrigine (LAMICTAL) 100 MG tablet 5    Sig: Take 2.5 tablets (250 mg total) by mouth once daily.    Class: Print    Route: Oral    lithium (ESKALITH) 450 MG TbSR 5    Sig: Take 1 tablet (450 mg total) by mouth every evening.    Class: Print    Route: Oral          "  Verify that the below list of medications is an accurate representation of the medications you are currently taking.  If none reported, the list may be blank. If incorrect, please contact your healthcare provider. Carry this list with you in case of emergency.           Current Medications     calcium citrate (CALCITRATE) 200 mg (950 mg) tablet Take 3 tablets (600 mg total) by mouth 3 (three) times daily with meals.    clonazePAM (KLONOPIN) 1 MG tablet Take 0.5-1 tablets (0.5-1 mg total) by mouth nightly as needed for Anxiety.    lamotrigine (LAMICTAL) 100 MG tablet Take 2.5 tablets (250 mg total) by mouth once daily.    lithium (ESKALITH) 450 MG TbSR Take 1 tablet (450 mg total) by mouth every evening.    olanzapine (ZYPREXA) 5 MG tablet Take 1.5 tablets (7.5 mg total) by mouth every evening.           Clinical Reference Information           Your Vitals Were     BP Pulse Height Weight BMI    131/63 84 5' 7" (1.702 m) 52.2 kg (115 lb) 18.01 kg/m2      Blood Pressure          Most Recent Value    BP  131/63      Allergies as of 3/23/2017     No Known Allergies      Immunizations Administered on Date of Encounter - 3/23/2017     None      [facility] Sign-Up     Activating your [facility] account is as easy as 1-2-3!     1) Visit [email or web address], select Sign Up Now, enter this activation code and your date of birth, then select Next.  [email or web address]  Expires: 4/8/2017 10:47 AM      2) Create a username and password to use when you visit [facility] in the future and select a security question in case you lose your password and select Next.    3) Enter your e-mail address and click Sign Up!    Additional Information  If you have questions, please e-mail [email or web address] or call [phone] to talk to our [facility] staff. Remember, [facility] is NOT to be used for urgent needs. For medical emergencies, dial 911.         Instructions    1. Continue lamictal 250 mg daily.  2. Increase zyprexa to 7.5 mg at " bedtime.  3. Decrease lithium to 450 mg at bedtime.  4. Continue klonopin 0.25 mg at bedtime if necessary, do not take together with tramadol.  5. Return for follow up on 4/5 at 4:30 (wednesday)       Language Assistance Services     ATTENTION: Language assistance services are available, free of charge. Please call [phone].      ATENCIÓN: Si habla español, tiene a bob disposición servicios gratuitos de asistencia lingüística. Llame al [phone].     ANTONY Ý: N?u b?n nói Ti?ng Vi?t, có các d?ch v? h? tr? ngôn ng? mi?n phí dành cho b?n. G?i s? [phone].         [facility] complies with applicable Federal civil rights laws and does not discriminate on the basis of race, color, national origin, age, disability, or sex.

## 2017-03-23 NOTE — PROGRESS NOTES
Ambulatory Psychiatry Established Patient Follow-up Note      Chief Complaint  presents for followup of depression    Time Spent  45 minutes    People Present  Patient and     HPI  Periods of high anxiety and nervousness, which have resolved with increases of zyprexa. Will walk for 2 hours twice a day to deal with the anxiety. Irritability. Perfectionism. Pressure to do things.  reflected on seasonal patterns, notes typical high energy and dysphoria in spring and early fall. Dropped dose of zyprexa back down from 7.5 mg to 5 mg still doing fairly well now. Patient still having periods of Protestant preoccupation and despair  ROS   Complete review of systems performed covering Constitutional, Eyes, ENT/Mouth, Cardiovascular, Respiratory, Gastrointestinal, Genitourinary, Musculoskeletal, Skin, Neurologic, Endocrine, and Allergy/Immune. Intermittent back pain. All other systems were negative.    Psych ROS covered elsewhere in note (HPI)    PFSH  Past Medical History reviewed: Yes  Family History reviewed: No  Social History reviewed: Yes  Medications/problem list/allergies reviewed: Yes    Medications  zyprexa 5 mg at bedtime.  Klonopin 0.5 mg qhs  Lithium 600 mg qhs  Lamictal 250 mg daily    Allergies  Review of patient's allergies indicates:  No Known Allergies    EXAM  VITALS     RELEVANT LABS/STUDIES:  Lithium level   Order: [id number]   Status:  Final result Visible to patient:  No (Not Released) Next appt:  None         Ref Range & Units 13d ago  (9/24/16) 3wk ago  (9/10/16) 1mo ago  (9/5/16) 1mo ago  (9/3/16)    Lithium Lvl 0.6 - 1.2 mmol/L 0.7 0.8 0.5 (L) 0.4 (L)   Resulting Agency  [facility]      Specimen Collected: 09/24/16  9:48 AM Last Resulted: 09/24/16 10:25 AM Lab Flowsheet Order Details View Encounter Lab and Collection Details Routing Result History           TSH   Order: [id number]   Status:  Final result Visible to patient:  No (Not Released) Next appt:  None         Ref Range &  Units 13d ago   1mo ago   5yr ago      TSH 0.400 - 4.000 uIU/mL 0.997 0.868 1.02R   Resulting Agency  [facility]      Specimen Collected: 09/24/16  2:05 PM Last Resulted: 09/24/16  3:13 PM Lab Flowsheet Order Details View Encounter Lab and Collection Details Routing Result              PTH, intact   Order: [id number]   Status:  Final result Visible to patient:  No (Not Released) Next appt:  None      Ref Range & Units 13d ago     PTH, Intact 9.0 - 77.0 pg/mL 148.0 (H)   Resulting Agency  [facility]      Specimen Collected: 09/24/16  2:05 PM Last Resulted: 09/24/16  2:58 PM Lab Flowsheet Order Details View Encounter Lab and Collection Details Routing Result History           CBC auto differential   Order: [id number]   Status:  Final result Visible to patient:  No (Not Released) Next appt:  None         Ref Range & Units 12d ago   13d ago   1mo ago   5yr ago      WBC 3.90 - 12.70 K/uL 11.70 15.14 (H) 6.52 4.68 (L)R    RBC 4.00 - 5.40 M/uL 4.00 4.57 4.51 4.22    Hemoglobin 12.0 - 16.0 g/dL 10.1 (L) 11.8 (L) 10.3 (L) 13.0R    Hematocrit 37.0 - 48.5 % 33.1 (L) 37.2 35.4 (L) 37.7    MCV 82 - 98 fL 83 81 (L) 79 (L) 89.3R    MCH 27.0 - 31.0 pg 25.3 (L) 25.8 (L) 22.8 (L) 30.8R    MCHC 32.0 - 36.0 % 30.5 (L) 31.7 (L) 29.1 (L) 34.5R    RDW 11.5 - 14.5 % 21.6 (H) 21.0 (H) 16.7 (H) 12.6    Platelets 150 - 350 K/uL 222 244 276 224    MPV 9.2 - 12.9 fL 10.4 10.1 9.4 10.2    Gran # 1.8 - 7.7 K/uL 9.7 (H) 13.6 (H) 4.6 1.9    Lymph # 1.0 - 4.8 K/uL 1.2 0.7 (L) 1.1 2.0R    Mono # 0.3 - 1.0 K/uL 0.6 0.7 0.6 0.4R    Eos # 0.0 - 0.5 K/uL 0.2 0.0 0.2 0.3R    Baso # 0.00 - 0.20 K/uL 0.03 0.02 0.02 0.0R    Gran% 38.0 - 73.0 % 82.8 (H) 90.1 (H) 70.1 41.4R    Lymph% 18.0 - 48.0 % 10.5 (L) 4.8 (L) 16.9 (L) 42.3R    Mono% 4.0 - 15.0 % 4.9 4.6 9.0 9.4 (H)R    Eosinophil% 0.0 - 8.0 % 1.3 0.1 3.5 6.0 (H)R    Basophil% 0.0 - 1.9 % 0.3 0.1 0.3 0.9R    Differential Method  Automated Automated Automated    Resulting Agency  [facility]       Specimen Collected: 09/25/16  5:44 AM Last Resulted: 09/25/16  7:26 AM Lab Flowsheet Order Details View Encounter Lab and Collection Details Routing Result History      R=Reference range differs from displayed range             Basic metabolic panel   Order: [id number]   Status:  Final result Visible to patient:  No (Not Released) Next appt:  None         Ref Range & Units 12d ago   13d ago   3wk ago   1mo ago      Sodium 136 - 145 mmol/L 142 141 140 144    Potassium 3.5 - 5.1 mmol/L 3.7 4.0CM 4.1 4.3    Chloride 95 - 110 mmol/L 113 (H) 108 112 (H) 111 (H)    CO2 23 - 29 mmol/L 23 22 (L) 21 (L) 26    Glucose 70 - 110 mg/dL 113 (H) 126 (H) 177 (H) 98    BUN, Bld 8 - 23 mg/dL 15 18 11 20    Creatinine 0.5 - 1.4 mg/dL 0.7 0.8 0.8 0.7    Calcium 8.7 - 10.5 mg/dL 7.9 (L) 8.4 (L) 8.6 (L) 9.1    Anion Gap 8 - 16 mmol/L 6 (L) 11 7 (L) 7 (L)    eGFR if African American >60 mL/min/1.73 m^2 >60.0 >60.0 >60.0 >60.0    eGFR if non African American >60 mL/min/1.73 m^2 >60.0 >60.0CM >60.0CM >60.0CM   Comments: Calculation used to obtain the estimated glomerular filtration   rate (eGFR) is the CKD-EPI equation. Since race is unknown   in our information system, the eGFR values for   -American and Non--American patients are given   for each creatinine result.      Resulting Agency  [facility]      Specimen Collected: 09/25/16  5:44 AM Last Resulted: 09/25/16  6:22 AM Lab Flowsheet Order Details View Encounter Lab and Collection Details Routing Result History      CM=Additional comments               PSYCHIATRIC EXAMINATION  Appearance: well groomed, appearing healthy and of stated age, thin    Behavior: cooperative, pleasant, mild psychomotor agitation  Speech: normal rate, rhythm, volume and amount  Mood:anxious  Affect:congruent but otherwise normal range  Thought Process: linear and organized.  Thought Content: negative for delusions or suicidal ideations or hallucintaiton. Rastafari  ruminations  Associations: intact  Memory: grossly intact.  Level of Consciousness/Orientation: grossly intact  Fund of Knowledge: good  Attention: good  Language: fluent, naming intact  Insight: fair  Judgment: fair    Neurological signs: no involuntary movements or tremor  Gait: normal    Medical Decision Making    IMPRESSION   65 yo  retired woman with past psych hx significant for seasonal depressive episodes, anxiety and alcohol use disorder in sustained remission, off antidepressants for past several years, now presenting with one month hx of depressed mood associated with hyperreligious obsessions. Patient admitted to [facility] for treatment of major depressive episode with psychosis and had partial response to combination of lexapro 20 mg and zyprexa 7.5 mg at bedtime. Pt on follow up on 8/11 denied current hyperreligiousity or obsessiveness but still reports depression. Continued on lexapro 20 mg and increased dose of zyprexa 10 mg, pt returned less than one week later with several days of hyperactivity, severe insomnia and restlessness with euthymic mood, on exam affect bright and mildly expansive. On review of remote records, patient with periods of elevated mood possibly coincing with past episodes of substance use, prior diagnosis of bipolar disorder for periods of insomnia and hyperactivity. +Family hx (daughter with bipolar), as well as mixed features to prior depressions (delusions, hyperreligiousity, obsessiveness) consistent with bipolar II disorder. Then reduced lexapro to 10 mg, started on klonopin for sleep and continued zyprexa at 10 mg. Patient returned one week later on 8/25 with lower mood, variable energy but still appearing  mildly agitated, having incongruent affect and hyperreligious on exam. Of note pt had decreased zyprexa fromo 10 mg to 3.75 mg several days before due to concern for mild transaminitis and lower energy. Attempted crosstaper of zyprexa to risperdal but patient resumed  zyprexa due to continued extremes mood swings and insomnia. Continued to taper off lexapro. Patient returned 2 weeks ago dysphoric and suicidal as well as with psychomotor agitation, increased goal directed activity and hyperreligiousity. Concerned for risk to self given severity of despair, expressed desire to die, Caodaism conviction and agitation. Admitted to [facility], where sx stabilized with addition of lithium. Patient without any further suicidal ideation, delusions or mixed sx, but now reporting apathy and sustained depression, also complaining of some cognitive effects and blurry vision that she ties to increase in lithium dose. Reduced lithium from 750 mg to 600 mg while keeping other meds the same. Pt returned today reporting euthymia and fairly stable mood apart from some overshopping, but states this is achronic impulse not clearly related to mood. Continued patient on lithium 600 mg, zyprexa 10, lexapro 5 and klonopin 0.5-1 for 2 weeks. Per  patient has been stable, however patient reporting some dysphoria. Started patient on lamictal and tapering off zyprexa and lexapro, patient reporting good mood, denying depression or manic sx, appearing euthymic. However patient returned 2 weeks later in mid November with high anxiety, insomnia, suggestive of mixed hypomanic state given high level of activity, mild impulsivity and frequent good but labile mood. Started pateint back on zyprexa 2.5 mg qhs with good effect, calmer and euthymic but still with anxiety, obsessive thoughts and insomnia. Increased lamictal to target anxiety, pt increased to 75 mg only rather than 100 mg due to misunderstanding. Has been mostly stable but with periodic breakthroughs of insomnia, depressed mood and racing thoughts which have responded to lamictal increases and temporary increases in zyprexa. Mood in general has been more positive with higher doses of lamictal, but still with breakthrough periods of anxiety, dypshoria and  agitation.    DIAGNOSES  Bipolar Disorder NOS, most recent episode mixed, in partial remission    PLAN  1. Continue lamictal 225 mg dailyfor bipolar depression. No evidence of rash.  2. Increase zyprexa to 7.5  mg at bedtime for mood stabilization and insomnia. Patient particularly responsive to this medications. Consider transitioning to latuda or seroquel  4. Reduce lithium to 450 mg qhs. Level in appropriate range. Has had partial but incomplete benefit for mood stabilization, little benefit for depression. May not need with lamictal and higher dose of zyprexa. Consider slow taper off.   5. Continue klonopin 0.25-1 mg as needed for insomnia. Counseled not to take at same time as tramadol or zyprexa.  6. Continue therapy.  7. Return in 4 weeks     More than 50% of the time was spent on counseling and coordination of care.  Behavioral counseling, supportive therapy, coordination with  on care

## 2017-04-05 ENCOUNTER — OFFICE VISIT (OUTPATIENT)
Dept: PSYCHIATRY | Facility: CLINIC | Age: 67
End: 2017-04-05
Payer: COMMERCIAL

## 2017-04-05 VITALS
HEIGHT: 67 IN | DIASTOLIC BLOOD PRESSURE: 61 MMHG | SYSTOLIC BLOOD PRESSURE: 126 MMHG | HEART RATE: 87 BPM | BODY MASS INDEX: 18.62 KG/M2 | WEIGHT: 118.63 LBS

## 2017-04-05 DIAGNOSIS — F31.70 BIPOLAR DISORDER IN PARTIAL REMISSION, MOST RECENT EPISODE UNSPECIFIED TYPE: Primary | Chronic | ICD-10-CM

## 2017-04-05 PROCEDURE — 1160F RVW MEDS BY RX/DR IN RCRD: CPT | Mod: S$GLB,,, | Performed by: PSYCHIATRY & NEUROLOGY

## 2017-04-05 PROCEDURE — 99214 OFFICE O/P EST MOD 30 MIN: CPT | Mod: S$GLB,,, | Performed by: PSYCHIATRY & NEUROLOGY

## 2017-04-05 PROCEDURE — 99999 PR PBB SHADOW E&M-EST. PATIENT-LVL II: CPT | Mod: PBBFAC,,, | Performed by: PSYCHIATRY & NEUROLOGY

## 2017-04-06 NOTE — PROGRESS NOTES
Ambulatory Psychiatry Established Patient Follow-up Note      Chief Complaint  presents for followup of depression    Time Spent  30 minutes    People Present  Patient and     HPI  Per patient and  has been better since last visit. Taking medications as prescribed. Sleeping better. Mood is more stable, mostly good with less dysphoria. Still anxious and ruminates about vartious topics but this is unchanged. Not clearly noticing any difference with decreased lithium either in cognitive side effects or in effect on mood.     ROS   Complete review of systems performed covering Constitutional, Eyes, ENT/Mouth, Cardiovascular, Respiratory, Gastrointestinal, Genitourinary, Musculoskeletal, Skin, Neurologic, Endocrine, and Allergy/Immune. Intermittent back pain. All other systems were negative.    Psych ROS covered elsewhere in note (HPI)    PFSH  Past Medical History reviewed: Yes  Family History reviewed: No  Social History reviewed: Yes  Medications/problem list/allergies reviewed: Yes    Medications  zyprexa 7.5 mg at bedtime.  Klonopin 0.5 mg qhs  Lithium 450 mg qhs  Lamictal 250 mg daily    Allergies  Review of patient's allergies indicates:  No Known Allergies    EXAM  VITALS     RELEVANT LABS/STUDIES:  Lithium level   Order: [id number]   Status:  Final result Visible to patient:  No (Not Released) Next appt:  None         Ref Range & Units 13d ago  (9/24/16) 3wk ago  (9/10/16) 1mo ago  (9/5/16) 1mo ago  (9/3/16)    Lithium Lvl 0.6 - 1.2 mmol/L 0.7 0.8 0.5 (L) 0.4 (L)   Resulting Agency  [facility]      Specimen Collected: 09/24/16  9:48 AM Last Resulted: 09/24/16 10:25 AM Lab Flowsheet Order Details View Encounter Lab and Collection Details Routing Result History           TSH   Order: [id number]   Status:  Final result Visible to patient:  No (Not Released) Next appt:  None         Ref Range & Units 13d ago   1mo ago   5yr ago      TSH 0.400 - 4.000 uIU/mL 0.997 0.868 1.02R   Resulting Agency   [facility]      Specimen Collected: 09/24/16  2:05 PM Last Resulted: 09/24/16  3:13 PM Lab Flowsheet Order Details View Encounter Lab and Collection Details Routing Result              PTH, intact   Order: [id number]   Status:  Final result Visible to patient:  No (Not Released) Next appt:  None      Ref Range & Units 13d ago     PTH, Intact 9.0 - 77.0 pg/mL 148.0 (H)   Resulting Agency  [facility]      Specimen Collected: 09/24/16  2:05 PM Last Resulted: 09/24/16  2:58 PM Lab Flowsheet Order Details View Encounter Lab and Collection Details Routing Result History           CBC auto differential   Order: [id number]   Status:  Final result Visible to patient:  No (Not Released) Next appt:  None         Ref Range & Units 12d ago   13d ago   1mo ago   5yr ago      WBC 3.90 - 12.70 K/uL 11.70 15.14 (H) 6.52 4.68 (L)R    RBC 4.00 - 5.40 M/uL 4.00 4.57 4.51 4.22    Hemoglobin 12.0 - 16.0 g/dL 10.1 (L) 11.8 (L) 10.3 (L) 13.0R    Hematocrit 37.0 - 48.5 % 33.1 (L) 37.2 35.4 (L) 37.7    MCV 82 - 98 fL 83 81 (L) 79 (L) 89.3R    MCH 27.0 - 31.0 pg 25.3 (L) 25.8 (L) 22.8 (L) 30.8R    MCHC 32.0 - 36.0 % 30.5 (L) 31.7 (L) 29.1 (L) 34.5R    RDW 11.5 - 14.5 % 21.6 (H) 21.0 (H) 16.7 (H) 12.6    Platelets 150 - 350 K/uL 222 244 276 224    MPV 9.2 - 12.9 fL 10.4 10.1 9.4 10.2    Gran # 1.8 - 7.7 K/uL 9.7 (H) 13.6 (H) 4.6 1.9    Lymph # 1.0 - 4.8 K/uL 1.2 0.7 (L) 1.1 2.0R    Mono # 0.3 - 1.0 K/uL 0.6 0.7 0.6 0.4R    Eos # 0.0 - 0.5 K/uL 0.2 0.0 0.2 0.3R    Baso # 0.00 - 0.20 K/uL 0.03 0.02 0.02 0.0R    Gran% 38.0 - 73.0 % 82.8 (H) 90.1 (H) 70.1 41.4R    Lymph% 18.0 - 48.0 % 10.5 (L) 4.8 (L) 16.9 (L) 42.3R    Mono% 4.0 - 15.0 % 4.9 4.6 9.0 9.4 (H)R    Eosinophil% 0.0 - 8.0 % 1.3 0.1 3.5 6.0 (H)R    Basophil% 0.0 - 1.9 % 0.3 0.1 0.3 0.9R    Differential Method  Automated Automated Automated    Resulting Agency  [facility]      Specimen Collected: 09/25/16  5:44 AM Last Resulted: 09/25/16  7:26 AM Lab Flowsheet Order Details View  Encounter Lab and Collection Details Routing Result History      R=Reference range differs from displayed range             Basic metabolic panel   Order: [id number]   Status:  Final result Visible to patient:  No (Not Released) Next appt:  None         Ref Range & Units 12d ago   13d ago   3wk ago   1mo ago      Sodium 136 - 145 mmol/L 142 141 140 144    Potassium 3.5 - 5.1 mmol/L 3.7 4.0CM 4.1 4.3    Chloride 95 - 110 mmol/L 113 (H) 108 112 (H) 111 (H)    CO2 23 - 29 mmol/L 23 22 (L) 21 (L) 26    Glucose 70 - 110 mg/dL 113 (H) 126 (H) 177 (H) 98    BUN, Bld 8 - 23 mg/dL 15 18 11 20    Creatinine 0.5 - 1.4 mg/dL 0.7 0.8 0.8 0.7    Calcium 8.7 - 10.5 mg/dL 7.9 (L) 8.4 (L) 8.6 (L) 9.1    Anion Gap 8 - 16 mmol/L 6 (L) 11 7 (L) 7 (L)    eGFR if African American >60 mL/min/1.73 m^2 >60.0 >60.0 >60.0 >60.0    eGFR if non African American >60 mL/min/1.73 m^2 >60.0 >60.0CM >60.0CM >60.0CM   Comments: Calculation used to obtain the estimated glomerular filtration   rate (eGFR) is the CKD-EPI equation. Since race is unknown   in our information system, the eGFR values for   -American and Non--American patients are given   for each creatinine result.      Resulting Agency  [facility]      Specimen Collected: 09/25/16  5:44 AM Last Resulted: 09/25/16  6:22 AM Lab Flowsheet Order Details View Encounter Lab and Collection Details Routing Result History      CM=Additional comments               PSYCHIATRIC EXAMINATION  Appearance: well groomed, appearing healthy and of stated age, thin, dressed in athletic clothes    Behavior: cooperative, pleasant, no psychomotor agitation or retardation  Speech: normal rate, rhythm, volume and amount  Mood:anxious but otherwise good  Affect:congruent but otherwise normal range  Thought Process: linear and organized.  Thought Content: negative for delusions or suicidal ideations or hallucintaiton. Obsessive thoughts but appears less ruminative, more insight and control into  obsessiveness.  Associations: intact  Memory: grossly intact.  Level of Consciousness/Orientation: grossly intact  Fund of Knowledge: good  Attention: good  Language: fluent, naming intact  Insight: fair  Judgment: fair    Neurological signs: no involuntary movements or tremor  Gait: normal    Medical Decision Making    IMPRESSION   65 yo  retired woman with past psych hx significant for seasonal depressive episodes, anxiety and alcohol use disorder in sustained remission, off antidepressants for past several years, now presenting with one month hx of depressed mood associated with hyperreligious obsessions. Patient admitted to [facility] for treatment of major depressive episode with psychosis and had partial response to combination of lexapro 20 mg and zyprexa 7.5 mg at bedtime. Pt on follow up on 8/11 denied current hyperreligiousity or obsessiveness but still reports depression. Continued on lexapro 20 mg and increased dose of zyprexa 10 mg, pt returned less than one week later with several days of hyperactivity, severe insomnia and restlessness with euthymic mood, on exam affect bright and mildly expansive. On review of remote records, patient with periods of elevated mood possibly coincing with past episodes of substance use, prior diagnosis of bipolar disorder for periods of insomnia and hyperactivity. +Family hx (daughter with bipolar), as well as mixed features to prior depressions (delusions, hyperreligiousity, obsessiveness) consistent with bipolar II disorder. Then reduced lexapro to 10 mg, started on klonopin for sleep and continued zyprexa at 10 mg. Patient returned one week later on 8/25 with lower mood, variable energy but still appearing  mildly agitated, having incongruent affect and hyperreligious on exam. Of note pt had decreased zyprexa fromo 10 mg to 3.75 mg several days before due to concern for mild transaminitis and lower energy. Attempted crosstaper of zyprexa to risperdal but patient  resumed zyprexa due to continued extremes mood swings and insomnia. Continued to taper off lexapro. Patient returned 2 weeks ago dysphoric and suicidal as well as with psychomotor agitation, increased goal directed activity and hyperreligiousity. Concerned for risk to self given severity of despair, expressed desire to die, Moravian conviction and agitation. Admitted to [facility], where sx stabilized with addition of lithium. Patient without any further suicidal ideation, delusions or mixed sx, but now reporting apathy and sustained depression, also complaining of some cognitive effects and blurry vision that she ties to increase in lithium dose. Reduced lithium from 750 mg to 600 mg while keeping other meds the same. Pt returned today reporting euthymia and fairly stable mood apart from some overshopping, but states this is achronic impulse not clearly related to mood. Continued patient on lithium 600 mg, zyprexa 10, lexapro 5 and klonopin 0.5-1 for 2 weeks. Per  patient has been stable, however patient reporting some dysphoria. Started patient on lamictal and tapering off zyprexa and lexapro, patient reporting good mood, denying depression or manic sx, appearing euthymic. However patient returned 2 weeks later in mid November with high anxiety, insomnia, suggestive of mixed hypomanic state given high level of activity, mild impulsivity and frequent good but labile mood. Started pateint back on zyprexa 2.5 mg qhs with good effect, calmer and euthymic but still with anxiety, obsessive thoughts and insomnia. Increased lamictal to target anxiety, pt increased to 75 mg only rather than 100 mg due to misunderstanding. Has been mostly stable but with periodic breakthroughs of insomnia, depressed mood and racing thoughts which have responded to lamictal increases and temporary increases in zyprexa. Mood in general has been more positive with higher doses of lamictal, but still with breakthrough periods of anxiety,  dypshoria and agitation. Due to clear responses to zyprexa and lamictal but not to lithium other than possible help with mood stabilization, have increased zyprexa dose to 7.5 mg qhs permanently and reduced dose of lithium to 450 mg qhs. Pt returns today with better, more stable mood, stil reports anxiety but anxiety appears more manageable with patient exhibiting CBT strategies during session to combat anxious thoughts.     DIAGNOSES  Bipolar Disorder NOS, most recent episode mixed, in partial remission    PLAN  1. Increase lamictal to 300 mg dailyfor bipolar depression. No evidence of rash.  2. Continue zyprexa 7.5  mg at bedtime for mood stabilization and insomnia. Patient particularly responsive to this medications. Consider transitioning to latuda or seroquel if side effects emerge.  4. Continue lithium 450 mg qhs. Level in appropriate range. Has had partial but incomplete benefit for mood stabilization, little benefit for depression. May not need with lamictal and higher dose of zyprexa. Consider slow taper off. Will plan to reduce to 300 mg qhs at next visit if still remaining improved.  5. Continue klonopin 0.25-1 mg as needed for insomnia. Counseled not to take at same time as tramadol or zyprexa.  6. Continue therapy.  7. Return in 4 weeks     More than 50% of the time was spent on counseling and coordination of care.  Behavioral counseling, supportive therapy, coordination with  on care

## 2017-04-17 ENCOUNTER — TELEPHONE (OUTPATIENT)
Dept: PSYCHIATRY | Facility: HOSPITAL | Age: 67
End: 2017-04-17

## 2017-04-17 NOTE — TELEPHONE ENCOUNTER
"Received fax from patient's therapist Cony Hamilton, reporting that patient has had increased obsessive and depressive thoughts. Called Dr Hamilton back (Release of information previously signed). She said that the patient described lower mood for past week, with ruminations about salvation and her daughters safety, no SI. Called patient who was out shopping. Initially she minimized symptoms, then she stated she had been depressed for the past week-2 weeks. Has been tired, unable to do her regular walks. Reports constant anxiety about Faith and her daughter. Stated that she could "put on a happy face" despite feeling down. Denied any suicidal thoughts but stated she was tired of it all. Wished she could just go to sleep for a long time but denied any thought of ending her life or doing anything that would risk her life. Stated she would not do that due to her Druze beliefs, fear of afterlife and lover for family. Has been taking medications as prescribed, increased lamictal to 300 mg. Asked pt to come in for appointment tomorrow but pateint refused stated she was tired of all this. Did agree to speak on the phone tomorrow and to allow me to speak with her . Called , who reports patient ahs been compliant with meds, sober from all substances and who was surprised that her mood was worse. He states that she seemed to have been doing well for past 2 weeks with apparent good mood, had still been walking daily if not 2x per day every day, sleeping well, no Druze delusions. Socializing with friends and apparently enjoying herself. Still had obsessions with shopping, spending excessive amount of time thinking about, looking for and talking about getting earrings one week and then about sofa the following week. Agreed for now to increase olanzapine to 10 mg at bedtime and observe. Unclear whether mood sx expressed today reflect mood lability or emergence of deep depression suddenly or depression that " she had here to now been covering up. Will call patient and  tomorrow to monitor. Encourage to come in to office later this week. As patient has significant support and convincingly denies any intent to harm self or end her life, no indication for involuntary treatment.

## 2017-05-01 ENCOUNTER — OFFICE VISIT (OUTPATIENT)
Dept: PSYCHIATRY | Facility: CLINIC | Age: 67
End: 2017-05-01
Payer: COMMERCIAL

## 2017-05-01 VITALS
HEIGHT: 67 IN | WEIGHT: 122.81 LBS | BODY MASS INDEX: 19.28 KG/M2 | HEART RATE: 74 BPM | SYSTOLIC BLOOD PRESSURE: 125 MMHG | DIASTOLIC BLOOD PRESSURE: 67 MMHG

## 2017-05-01 DIAGNOSIS — F31.32 BIPOLAR AFFECTIVE DISORDER, CURRENTLY DEPRESSED, MODERATE: ICD-10-CM

## 2017-05-01 PROCEDURE — 99999 PR PBB SHADOW E&M-EST. PATIENT-LVL II: CPT | Mod: PBBFAC,,, | Performed by: PSYCHIATRY & NEUROLOGY

## 2017-05-01 PROCEDURE — 99214 OFFICE O/P EST MOD 30 MIN: CPT | Mod: S$GLB,,, | Performed by: PSYCHIATRY & NEUROLOGY

## 2017-05-01 RX ORDER — OLANZAPINE 10 MG/1
10 TABLET ORAL NIGHTLY
Qty: 30 TABLET | Refills: 3 | Status: SHIPPED | OUTPATIENT
Start: 2017-05-01 | End: 2017-06-07 | Stop reason: SDUPTHER

## 2017-05-01 RX ORDER — LAMOTRIGINE 100 MG/1
TABLET ORAL
Qty: 45 TABLET | Refills: 1 | Status: SHIPPED | OUTPATIENT
Start: 2017-05-01 | End: 2017-06-07

## 2017-05-01 RX ORDER — ESCITALOPRAM OXALATE 5 MG/1
5 TABLET ORAL DAILY
Qty: 30 TABLET | Refills: 3 | Status: SHIPPED | OUTPATIENT
Start: 2017-05-01 | End: 2017-05-15 | Stop reason: SDUPTHER

## 2017-05-01 NOTE — PATIENT INSTRUCTIONS
1. Increase lexapro to 5 mg daily.  2. Continue lithium and zyprexa at current doses. You may also use 0.5 mg of klonopin as needed at bedtime for sleep.  3. Taper off lamotrigine, reduce to 200 mg daily for one week, then to 150 mg daily for one week, then 100 mg daily for one week then to 50 mg daily for one week.  4. Return for follow up in one month.

## 2017-05-01 NOTE — MR AVS SNAPSHOT
[facility]  [address]  [place] [address]  Phone: [phone]  Fax: [phone]                  Martha Garner   2017 2:30 PM   Office Visit    Description:  Female : 1950   Provider:  Hloley Smith MD   Department:  [facility]           Diagnoses this Visit        Comments    Bipolar disorder, current episode mixed, moderate                To Do List           Future Appointments        Provider Department Dept Phone    2017 3:00 PM Holley Smith MD [facility] [phone]      Goals (5 Years of Data)     None       These Medications        Disp Refills Start End    lamotrigine (LAMICTAL) 100 MG tablet 45 tablet 1 2017     Take 200 mg daily for 1 week, then 150 mg daily for 1 week, then 100 mg daily for 1 week, then 50 mg for 1 week then stop.    Pharmacy: [facility] - [place] [address] Ph #: [phone]       olanzapine (ZYPREXA) 10 MG tablet 30 tablet 3 2017    Take 1 tablet (10 mg total) by mouth every evening. - Oral    Pharmacy: [facility] - [place] [address] Ph #: [phone]       escitalopram oxalate (LEXAPRO) 5 MG Tab 30 tablet 3 2017    Take 1 tablet (5 mg total) by mouth once daily. - Oral    Pharmacy: [facility] - [place] [address] Ph #: [phone]         [facility]     [facility] Nurse Care Line -  Assistance  Unless otherwise directed by your provider, please contact [facility], our nurse care line that is available for  assistance.     Registered nurses in the [facility] provide: appointment scheduling, clinical advisement, health education, and other advisory services.  Call: [phone] (toll free)               Medications           Message regarding Medications     Verify the changes and/or additions to your medication  regime listed below are the same as discussed with your clinician today.  If any of these changes or additions are incorrect, please notify your healthcare provider.        START taking these NEW medications        Refills    escitalopram oxalate (LEXAPRO) 5 MG Tab 3    Sig: Take 1 tablet (5 mg total) by mouth once daily.    Class: Normal    Route: Oral      CHANGE how you are taking these medications     Start Taking Instead of    lamotrigine (LAMICTAL) 100 MG tablet lamotrigine (LAMICTAL) 100 MG tablet    Dosage:  Take 200 mg daily for 1 week, then 150 mg daily for 1 week, then 100 mg daily for 1 week, then 50 mg for 1 week then stop. Dosage:  Take 2.5 tablets (250 mg total) by mouth once daily.    Reason for Change:  Reorder     olanzapine (ZYPREXA) 10 MG tablet olanzapine (ZYPREXA) 5 MG tablet    Dosage:  Take 1 tablet (10 mg total) by mouth every evening. Dosage:  Take 1.5 tablets (7.5 mg total) by mouth every evening.    Reason for Change:  Reorder            Verify that the below list of medications is an accurate representation of the medications you are currently taking.  If none reported, the list may be blank. If incorrect, please contact your healthcare provider. Carry this list with you in case of emergency.           Current Medications     calcium citrate (CALCITRATE) 200 mg (950 mg) tablet Take 3 tablets (600 mg total) by mouth 3 (three) times daily with meals.    clonazePAM (KLONOPIN) 1 MG tablet Take 0.5-1 tablets (0.5-1 mg total) by mouth nightly as needed for Anxiety.    escitalopram oxalate (LEXAPRO) 5 MG Tab Take 1 tablet (5 mg total) by mouth once daily.    lamotrigine (LAMICTAL) 100 MG tablet Take 200 mg daily for 1 week, then 150 mg daily for 1 week, then 100 mg daily for 1 week, then 50 mg for 1 week then stop.    lithium (ESKALITH) 450 MG TbSR Take 1 tablet (450 mg total) by mouth every evening.    olanzapine (ZYPREXA) 10 MG tablet Take 1 tablet (10 mg total) by mouth every evening.     "       Clinical Reference Information           Your Vitals Were     BP Pulse Height Weight BMI    125/67 74 5' 7" (1.702 m) 55.7 kg (122 lb 12.8 oz) 19.23 kg/m2      Blood Pressure          Most Recent Value    BP  125/67      Allergies as of 5/1/2017     No Known Allergies      Immunizations Administered on Date of Encounter - 5/1/2017     None      [facility] Sign-Up     Activating your [facility] account is as easy as 1-2-3!     1) Visit [email or web address], select Sign Up Now, enter this activation code and your date of birth, then select Next.  [email or web address]  Expires: 6/15/2017  3:11 PM      2) Create a username and password to use when you visit [facility] in the future and select a security question in case you lose your password and select Next.    3) Enter your e-mail address and click Sign Up!    Additional Information  If you have questions, please e-mail [email or web address] or call [phone] to talk to our [facility] staff. Remember, [facility] is NOT to be used for urgent needs. For medical emergencies, dial 911.         Instructions    1. Increase lexapro to 5 mg daily.  2. Continue lithium and zyprexa at current doses. You may also use 0.5 mg of klonopin as needed at bedtime for sleep.  3. Taper off lamotrigine, reduce to 200 mg daily for one week, then to 150 mg daily for one week, then 100 mg daily for one week then to 50 mg daily for one week.  4. Return for follow up in        Language Assistance Services     ATTENTION: Language assistance services are available, free of charge. Please call [phone].      ATENCIÓN: Si habla español, tiene a bob disposición servicios gratuitos de asistencia lingüística. Llame al [phone].     ANTONY Ý: N?u b?n nói Ti?ng Vi?t, có các d?ch v? h? tr? ngôn ng? mi?n phí dành cho b?n. G?i s? [phone].         [facility] complies with applicable Federal civil rights laws and does not discriminate on the basis of race, color, national origin, age, " disability, or sex.

## 2017-05-01 NOTE — PROGRESS NOTES
Ambulatory Psychiatry Established Patient Follow-up Note      Chief Complaint  presents for followup of depression    Time Spent  30 minutes    People Present  Patient, spoke with kristy on the phone    HPI  Still feels depressed, life feels a struggle. Feels depressed, still thinking about salvation. Denies psychotic sx or delusions.      ROS   Complete review of systems performed covering Constitutional, Eyes, ENT/Mouth, Cardiovascular, Respiratory, Gastrointestinal, Genitourinary, Musculoskeletal, Skin, Neurologic, Endocrine, and Allergy/Immune. Intermittent back pain. All other systems were negative.    Psych ROS covered elsewhere in note (HPI)    PFSH  Past Medical History reviewed: Yes  Family History reviewed: No  Social History reviewed: Yes  Medications/problem list/allergies reviewed: Yes    Medications  zyprexa 7.5 mg at bedtime.  Klonopin 0.5 mg qhs  Lithium 450 mg qhs  Lamictal 250 mg daily    Allergies  Review of patient's allergies indicates:  No Known Allergies    EXAM  VITALS     RELEVANT LABS/STUDIES:  Lithium level   Order: [id number]   Status:  Final result Visible to patient:  No (Not Released) Next appt:  None         Ref Range & Units 13d ago  (9/24/16) 3wk ago  (9/10/16) 1mo ago  (9/5/16) 1mo ago  (9/3/16)    Lithium Lvl 0.6 - 1.2 mmol/L 0.7 0.8 0.5 (L) 0.4 (L)   [facility]ing Agency  [facility]      Specimen Collected: 09/24/16  9:48 AM Last Resulted: 09/24/16 10:25 AM Lab Flowsheet Order Details View Encounter Lab and Collection Details Routing Result History           TSH   Order: [id number]   Status:  Final result Visible to patient:  No (Not Released) Next appt:  None         Ref Range & Units 13d ago   1mo ago   5yr ago      TSH 0.400 - 4.000 uIU/mL 0.997 0.868 1.02R   Resulting Agency  [facility]      Specimen Collected: 09/24/16  2:05 PM Last Resulted: 09/24/16  3:13 PM Lab Flowsheet Order Details View Encounter Lab and Collection Details Routing Result              PTH,  intact   Order: [id number]   Status:  Final result Visible to patient:  No (Not Released) Next appt:  None      Ref Range & Units 13d ago     PTH, Intact 9.0 - 77.0 pg/mL 148.0 (H)   Resulting Agency  [facility]      Specimen Collected: 09/24/16  2:05 PM Last Resulted: 09/24/16  2:58 PM Lab Flowsheet Order Details View Encounter Lab and Collection Details Routing Result History           CBC auto differential   Order: [id number]   Status:  Final result Visible to patient:  No (Not Released) Next appt:  None         Ref Range & Units 12d ago   13d ago   1mo ago   5yr ago      WBC 3.90 - 12.70 K/uL 11.70 15.14 (H) 6.52 4.68 (L)R    RBC 4.00 - 5.40 M/uL 4.00 4.57 4.51 4.22    Hemoglobin 12.0 - 16.0 g/dL 10.1 (L) 11.8 (L) 10.3 (L) 13.0R    Hematocrit 37.0 - 48.5 % 33.1 (L) 37.2 35.4 (L) 37.7    MCV 82 - 98 fL 83 81 (L) 79 (L) 89.3R    MCH 27.0 - 31.0 pg 25.3 (L) 25.8 (L) 22.8 (L) 30.8R    MCHC 32.0 - 36.0 % 30.5 (L) 31.7 (L) 29.1 (L) 34.5R    RDW 11.5 - 14.5 % 21.6 (H) 21.0 (H) 16.7 (H) 12.6    Platelets 150 - 350 K/uL 222 244 276 224    MPV 9.2 - 12.9 fL 10.4 10.1 9.4 10.2    Gran # 1.8 - 7.7 K/uL 9.7 (H) 13.6 (H) 4.6 1.9    Lymph # 1.0 - 4.8 K/uL 1.2 0.7 (L) 1.1 2.0R    Mono # 0.3 - 1.0 K/uL 0.6 0.7 0.6 0.4R    Eos # 0.0 - 0.5 K/uL 0.2 0.0 0.2 0.3R    Baso # 0.00 - 0.20 K/uL 0.03 0.02 0.02 0.0R    Gran% 38.0 - 73.0 % 82.8 (H) 90.1 (H) 70.1 41.4R    Lymph% 18.0 - 48.0 % 10.5 (L) 4.8 (L) 16.9 (L) 42.3R    Mono% 4.0 - 15.0 % 4.9 4.6 9.0 9.4 (H)R    Eosinophil% 0.0 - 8.0 % 1.3 0.1 3.5 6.0 (H)R    Basophil% 0.0 - 1.9 % 0.3 0.1 0.3 0.9R    Differential Method  Automated Automated Automated    Resulting Agency  [facility]      Specimen Collected: 09/25/16  5:44 AM Last Resulted: 09/25/16  7:26 AM Lab Flowsheet Order Details View Encounter Lab and Collection Details Routing Result History      R=Reference range differs from displayed range             Basic metabolic panel   Order: [id number]   Status:  Final result Visible  to patient:  No (Not Released) Next appt:  None         Ref Range & Units 12d ago   13d ago   3wk ago   1mo ago      Sodium 136 - 145 mmol/L 142 141 140 144    Potassium 3.5 - 5.1 mmol/L 3.7 4.0CM 4.1 4.3    Chloride 95 - 110 mmol/L 113 (H) 108 112 (H) 111 (H)    CO2 23 - 29 mmol/L 23 22 (L) 21 (L) 26    Glucose 70 - 110 mg/dL 113 (H) 126 (H) 177 (H) 98    BUN, Bld 8 - 23 mg/dL 15 18 11 20    Creatinine 0.5 - 1.4 mg/dL 0.7 0.8 0.8 0.7    Calcium 8.7 - 10.5 mg/dL 7.9 (L) 8.4 (L) 8.6 (L) 9.1    Anion Gap 8 - 16 mmol/L 6 (L) 11 7 (L) 7 (L)    eGFR if African American >60 mL/min/1.73 m^2 >60.0 >60.0 >60.0 >60.0    eGFR if non African American >60 mL/min/1.73 m^2 >60.0 >60.0CM >60.0CM >60.0CM   Comments: Calculation used to obtain the estimated glomerular filtration   rate (eGFR) is the CKD-EPI equation. Since race is unknown   in our information system, the eGFR values for   -American and Non--American patients are given   for each creatinine result.      Resulting Agency  [facility]      Specimen Collected: 09/25/16  5:44 AM Last Resulted: 09/25/16  6:22 AM Lab Flowsheet Order Details View Encounter Lab and Collection Details Routing Result History      CM=Additional comments               PSYCHIATRIC EXAMINATION  Appearance: well groomed, appearing healthy and of stated age, thin, dressed in athletic clothes    Behavior: cooperative, pleasant, no psychomotor agitation or retardation  Speech: normal rate, rhythm, volume and amount  Mood:anxious but otherwise good  Affect:congruent but otherwise normal range  Thought Process: linear and organized.  Thought Content: negative for delusions or suicidal ideations or hallucintaiton. Obsessive thoughts but appears less ruminative, more insight and control into obsessiveness.  Associations: intact  Memory: grossly intact.  Level of Consciousness/Orientation: grossly intact  Fund of Knowledge: good  Attention: good  Language: fluent, naming  intact  Insight: fair  Judgment: fair    Neurological signs: no involuntary movements or tremor  Gait: normal    Medical Decision Making    IMPRESSION   65 yo  retired woman with past psych hx significant for seasonal depressive episodes, anxiety and alcohol use disorder in sustained remission, off antidepressants for past several years, now presenting with one month hx of depressed mood associated with hyperreligious obsessions. Patient admitted to [facility] for treatment of major depressive episode with psychosis and had partial response to combination of lexapro 20 mg and zyprexa 7.5 mg at bedtime. Pt on follow up on 8/11 denied current hyperreligiousity or obsessiveness but still reports depression. Continued on lexapro 20 mg and increased dose of zyprexa 10 mg, pt returned less than one week later with several days of hyperactivity, severe insomnia and restlessness with euthymic mood, on exam affect bright and mildly expansive. On review of remote records, patient with periods of elevated mood possibly coincing with past episodes of substance use, prior diagnosis of bipolar disorder for periods of insomnia and hyperactivity. +Family hx (daughter with bipolar), as well as mixed features to prior depressions (delusions, hyperreligiousity, obsessiveness) consistent with bipolar II disorder. Then reduced lexapro to 10 mg, started on klonopin for sleep and continued zyprexa at 10 mg. Patient returned one week later on 8/25 with lower mood, variable energy but still appearing  mildly agitated, having incongruent affect and hyperreligious on exam. Of note pt had decreased zyprexa fromo 10 mg to 3.75 mg several days before due to concern for mild transaminitis and lower energy. Attempted crosstaper of zyprexa to risperdal but patient resumed zyprexa due to continued extremes mood swings and insomnia. Continued to taper off lexapro. Patient returned 2 weeks ago dysphoric and suicidal as well as with psychomotor  agitation, increased goal directed activity and hyperreligiousity. Concerned for risk to self given severity of despair, expressed desire to die, Quaker conviction and agitation. Admitted to [facility], where sx stabilized with addition of lithium. Patient without any further suicidal ideation, delusions or mixed sx, but now reporting apathy and sustained depression, also complaining of some cognitive effects and blurry vision that she ties to increase in lithium dose. Reduced lithium from 750 mg to 600 mg while keeping other meds the same. Pt returned today reporting euthymia and fairly stable mood apart from some overshopping, but states this is achronic impulse not clearly related to mood. Continued patient on lithium 600 mg, zyprexa 10, lexapro 5 and klonopin 0.5-1 for 2 weeks. Per  patient has been stable, however patient reporting some dysphoria. Started patient on lamictal and tapering off zyprexa and lexapro, patient reporting good mood, denying depression or manic sx, appearing euthymic. However patient returned 2 weeks later in mid November with high anxiety, insomnia, suggestive of mixed hypomanic state given high level of activity, mild impulsivity and frequent good but labile mood. Started pateint back on zyprexa 2.5 mg qhs with good effect, calmer and euthymic but still with anxiety, obsessive thoughts and insomnia. Increased lamictal to target anxiety, pt increased to 75 mg only rather than 100 mg due to misunderstanding. Has been mostly stable but with periodic breakthroughs of insomnia, depressed mood and racing thoughts which have responded to lamictal increases and temporary increases in zyprexa. Mood in general has been more positive with higher doses of lamictal, but still with breakthrough periods of anxiety, dypshoria and agitation. Due to clear responses to zyprexa and lamictal but not to lithium other than possible help with mood stabilization, have increased zyprexa dose to 7.5 mg qhs  permanently and reduced dose of lithium to 450 mg qhs. Patient has continued to have depression with obsessive ruminations without clear evidence of stone or mixed state currently. Started patient on lexapro 2.5 mg daily with mild benefit to mood, no sign currently of activation.    DIAGNOSES  Bipolar Disorder NOS, most recent episode depressed.    PLAN  1. Increase lexapro to 5 mg daily.  2. Taper off lamictal by 50 mg per week.   3. Continue zyprexa 10  mg at bedtime for mood stabilization and insomnia. Patient particularly responsive to this medications. Consider transitioning to latuda or seroquel if side effects emerge.  4. Continue lithium 450 mg qhs. Level in appropriate range. Has had partial but incomplete benefit for mood stabilization, little benefit for depression.Consider slow taper off. .  5. Continue klonopin 0.25-1 mg as needed for insomnia. Counseled not to take at same time as tramadol or zyprexa.  6. Continue therapy.  7. Return in 4 weeks     More than 50% of the time was spent on counseling and coordination of care.  Behavioral counseling, supportive therapy, coordination with  on care

## 2017-05-15 ENCOUNTER — TELEPHONE (OUTPATIENT)
Dept: PSYCHIATRY | Facility: HOSPITAL | Age: 67
End: 2017-05-15

## 2017-05-15 DIAGNOSIS — F31.32 BIPOLAR AFFECTIVE DISORDER, CURRENTLY DEPRESSED, MODERATE: ICD-10-CM

## 2017-05-15 RX ORDER — ESCITALOPRAM OXALATE 10 MG/1
10 TABLET ORAL DAILY
Qty: 30 TABLET | Refills: 3 | Status: SHIPPED | OUTPATIENT
Start: 2017-05-15 | End: 2017-06-07 | Stop reason: SDUPTHER

## 2017-05-15 NOTE — TELEPHONE ENCOUNTER
----- Message from Ramon Matthew MA sent at 5/15/2017 11:03 AM CDT -----  Contact: pt  [phone]    Pt is requesting a call back.    Called patient back. Reports continuing to feel like she's in a funk. Denies new symptoms, states that she still feels blah and stuck. Sometimes wishes she weren't here but denies any more active thoughts about suiicde, denies any intent or desire to end her own life, agrees to let me and  know and seek emergent help if she does. Sleeping well, denies significant mood swings. Agreed with increasing her lexapro to 10 mg daily for now. Next appointment in 3 weeks, encouraged patient to come in sooner. Patient stated that she would call me if things not improving. Asked her to call me on Friday and give me update on her status.

## 2017-06-07 ENCOUNTER — OFFICE VISIT (OUTPATIENT)
Dept: PSYCHIATRY | Facility: CLINIC | Age: 67
End: 2017-06-07
Payer: COMMERCIAL

## 2017-06-07 VITALS
BODY MASS INDEX: 18.99 KG/M2 | HEIGHT: 67 IN | WEIGHT: 121 LBS | HEART RATE: 81 BPM | DIASTOLIC BLOOD PRESSURE: 65 MMHG | SYSTOLIC BLOOD PRESSURE: 117 MMHG

## 2017-06-07 DIAGNOSIS — F31.78 BIPOLAR I DISORDER, MOST RECENT EPISODE MIXED, IN FULL REMISSION: Primary | ICD-10-CM

## 2017-06-07 PROCEDURE — 99214 OFFICE O/P EST MOD 30 MIN: CPT | Mod: S$GLB,,, | Performed by: PSYCHIATRY & NEUROLOGY

## 2017-06-07 PROCEDURE — 99999 PR PBB SHADOW E&M-EST. PATIENT-LVL III: CPT | Mod: PBBFAC,,, | Performed by: PSYCHIATRY & NEUROLOGY

## 2017-06-07 RX ORDER — CLONAZEPAM 1 MG/1
.5-1 TABLET ORAL NIGHTLY PRN
Qty: 30 TABLET | Refills: 3 | Status: SHIPPED | OUTPATIENT
Start: 2017-06-07 | End: 2017-10-19 | Stop reason: SDUPTHER

## 2017-06-07 RX ORDER — ESCITALOPRAM OXALATE 10 MG/1
10 TABLET ORAL DAILY
Qty: 30 TABLET | Refills: 3 | Status: SHIPPED | OUTPATIENT
Start: 2017-06-07 | End: 2017-10-19 | Stop reason: SDUPTHER

## 2017-06-07 RX ORDER — LITHIUM CARBONATE 450 MG/1
450 TABLET ORAL NIGHTLY
Qty: 30 TABLET | Refills: 3 | Status: SHIPPED | OUTPATIENT
Start: 2017-06-07 | End: 2017-07-27

## 2017-06-07 RX ORDER — OLANZAPINE 10 MG/1
10 TABLET ORAL NIGHTLY
Qty: 30 TABLET | Refills: 3 | Status: SHIPPED | OUTPATIENT
Start: 2017-06-07 | End: 2017-10-19 | Stop reason: SDUPTHER

## 2017-06-07 NOTE — PROGRESS NOTES
Ambulatory Psychiatry Established Patient Follow-up Note      Chief Complaint  presents for followup of depression    Time Spent  20 minutes    People Present  Patient    HPI  Feeling okay. Has been feeling good for about a month. Sleeping well, but needs klonopin. No anhedonia. Still with religous ruminations but feels better able to challenged and dismiss them. Denies hypomanic or impulsive behaviors.     ROS   Complete review of systems performed covering Constitutional, Eyes, ENT/Mouth, Cardiovascular, Respiratory, Gastrointestinal, Genitourinary, Musculoskeletal, Skin, Neurologic, Endocrine, and Allergy/Immune. Intermittent back pain. All other systems were negative.    Psych ROS covered elsewhere in note (HPI)    PFSH  Past Medical History reviewed: Yes  Family History reviewed: No  Social History reviewed: Yes  Medications/problem list/allergies reviewed: Yes    Medications  zyprexa 10  mg at bedtime.  Klonopin 0.5 mg qhs  Lithium 450 mg qhs  Lexapro 10 mg daily    Allergies  Review of patient's allergies indicates:  No Known Allergies    EXAM  VITALS     RELEVANT LABS/STUDIES:  Lithium level   Order: [id number]   Status:  Final result Visible to patient:  No (Not Released) Next appt:  None         Ref Range & Units 13d ago  (9/24/16) 3wk ago  (9/10/16) 1mo ago  (9/5/16) 1mo ago  (9/3/16)    Lithium Lvl 0.6 - 1.2 mmol/L 0.7 0.8 0.5 (L) 0.4 (L)   [facility]ing Agency  [facility]      Specimen Collected: 09/24/16  9:48 AM Last Resulted: 09/24/16 10:25 AM Lab Flowsheet Order Details View Encounter Lab and Collection Details Routing Result History           TSH   Order: [id number]   Status:  Final result Visible to patient:  No (Not Released) Next appt:  None         Ref Range & Units 13d ago   1mo ago   5yr ago      TSH 0.400 - 4.000 uIU/mL 0.997 0.868 1.02R   Resulting Agency  [facility]      Specimen Collected: 09/24/16  2:05 PM Last Resulted: 09/24/16  3:13 PM Lab Flowsheet Order Details View  Encounter Lab and Collection Details Routing Result              PTH, intact   Order: [id number]   Status:  Final result Visible to patient:  No (Not Released) Next appt:  None      Ref Range & Units 13d ago     PTH, Intact 9.0 - 77.0 pg/mL 148.0 (H)   Resulting Agency  [facility]      Specimen Collected: 09/24/16  2:05 PM Last Resulted: 09/24/16  2:58 PM Lab Flowsheet Order Details View Encounter Lab and Collection Details Routing Result History           CBC auto differential   Order: [id number]   Status:  Final result Visible to patient:  No (Not Released) Next appt:  None         Ref Range & Units 12d ago   13d ago   1mo ago   5yr ago      WBC 3.90 - 12.70 K/uL 11.70 15.14 (H) 6.52 4.68 (L)R    RBC 4.00 - 5.40 M/uL 4.00 4.57 4.51 4.22    Hemoglobin 12.0 - 16.0 g/dL 10.1 (L) 11.8 (L) 10.3 (L) 13.0R    Hematocrit 37.0 - 48.5 % 33.1 (L) 37.2 35.4 (L) 37.7    MCV 82 - 98 fL 83 81 (L) 79 (L) 89.3R    MCH 27.0 - 31.0 pg 25.3 (L) 25.8 (L) 22.8 (L) 30.8R    MCHC 32.0 - 36.0 % 30.5 (L) 31.7 (L) 29.1 (L) 34.5R    RDW 11.5 - 14.5 % 21.6 (H) 21.0 (H) 16.7 (H) 12.6    Platelets 150 - 350 K/uL 222 244 276 224    MPV 9.2 - 12.9 fL 10.4 10.1 9.4 10.2    Gran # 1.8 - 7.7 K/uL 9.7 (H) 13.6 (H) 4.6 1.9    Lymph # 1.0 - 4.8 K/uL 1.2 0.7 (L) 1.1 2.0R    Mono # 0.3 - 1.0 K/uL 0.6 0.7 0.6 0.4R    Eos # 0.0 - 0.5 K/uL 0.2 0.0 0.2 0.3R    Baso # 0.00 - 0.20 K/uL 0.03 0.02 0.02 0.0R    Gran% 38.0 - 73.0 % 82.8 (H) 90.1 (H) 70.1 41.4R    Lymph% 18.0 - 48.0 % 10.5 (L) 4.8 (L) 16.9 (L) 42.3R    Mono% 4.0 - 15.0 % 4.9 4.6 9.0 9.4 (H)R    Eosinophil% 0.0 - 8.0 % 1.3 0.1 3.5 6.0 (H)R    Basophil% 0.0 - 1.9 % 0.3 0.1 0.3 0.9R    Differential Method  Automated Automated Automated    Resulting Agency  [facility]      Specimen Collected: 09/25/16  5:44 AM Last Resulted: 09/25/16  7:26 AM Lab Flowsheet Order Details View Encounter Lab and Collection Details Routing Result History      R=Reference range differs from displayed range              Basic metabolic panel   Order: [id number]   Status:  Final result Visible to patient:  No (Not Released) Next appt:  None         Ref Range & Units 12d ago   13d ago   3wk ago   1mo ago      Sodium 136 - 145 mmol/L 142 141 140 144    Potassium 3.5 - 5.1 mmol/L 3.7 4.0CM 4.1 4.3    Chloride 95 - 110 mmol/L 113 (H) 108 112 (H) 111 (H)    CO2 23 - 29 mmol/L 23 22 (L) 21 (L) 26    Glucose 70 - 110 mg/dL 113 (H) 126 (H) 177 (H) 98    BUN, Bld 8 - 23 mg/dL 15 18 11 20    Creatinine 0.5 - 1.4 mg/dL 0.7 0.8 0.8 0.7    Calcium 8.7 - 10.5 mg/dL 7.9 (L) 8.4 (L) 8.6 (L) 9.1    Anion Gap 8 - 16 mmol/L 6 (L) 11 7 (L) 7 (L)    eGFR if African American >60 mL/min/1.73 m^2 >60.0 >60.0 >60.0 >60.0    eGFR if non African American >60 mL/min/1.73 m^2 >60.0 >60.0CM >60.0CM >60.0CM   Comments: Calculation used to obtain the estimated glomerular filtration   rate (eGFR) is the CKD-EPI equation. Since race is unknown   in our information system, the eGFR values for   -American and Non--American patients are given   for each creatinine result.      Resulting Agency  [facility]      Specimen Collected: 09/25/16  5:44 AM Last Resulted: 09/25/16  6:22 AM Lab Flowsheet Order Details View Encounter Lab and Collection Details Routing Result History      CM=Additional comments               PSYCHIATRIC EXAMINATION  Appearance: well groomed, appearing healthy and of stated age, thin, dressed in athletic clothes    Behavior: cooperative, pleasant, no psychomotor agitation or retardation  Speech: normal rate, rhythm, volume and amount  Mood:okay  Affect:brighter  Thought Process: linear and organized.  Thought Content: negative for delusions or suicidal ideations or hallucintaiton. Obsessive relgious thoughts but improved from before, better able to challenge and dismiss.   Associations: intact  Memory: grossly intact.  Level of Consciousness/Orientation: grossly intact  Fund of Knowledge: good  Attention: good  Language:  fluent, naming intact  Insight: fair  Judgment: fair    Neurological signs: no involuntary movements or tremor  Gait: normal    Medical Decision Making    IMPRESSION   65 yo  retired woman with past psych hx significant for seasonal depressive episodes, anxiety and alcohol use disorder in sustained remission, off antidepressants for past several years, now presenting with one month hx of depressed mood associated with hyperreligious obsessions. Patient admitted to [facility] for treatment of major depressive episode with psychosis and had partial response to combination of lexapro 20 mg and zyprexa 7.5 mg at bedtime. Pt on follow up on 8/11 denied current hyperreligiousity or obsessiveness but still reports depression. Continued on lexapro 20 mg and increased dose of zyprexa 10 mg, pt returned less than one week later with several days of hyperactivity, severe insomnia and restlessness with euthymic mood, on exam affect bright and mildly expansive. On review of remote records, patient with periods of elevated mood possibly coincing with past episodes of substance use, prior diagnosis of bipolar disorder for periods of insomnia and hyperactivity. +Family hx (daughter with bipolar), as well as mixed features to prior depressions (delusions, hyperreligiousity, obsessiveness) consistent with bipolar II disorder. Then reduced lexapro to 10 mg, started on klonopin for sleep and continued zyprexa at 10 mg. Patient returned one week later on 8/25 with lower mood, variable energy but still appearing  mildly agitated, having incongruent affect and hyperreligious on exam. Of note pt had decreased zyprexa fromo 10 mg to 3.75 mg several days before due to concern for mild transaminitis and lower energy. Attempted crosstaper of zyprexa to risperdal but patient resumed zyprexa due to continued extremes mood swings and insomnia. Continued to taper off lexapro. Patient returned 2 weeks ago dysphoric and suicidal as well as with  psychomotor agitation, increased goal directed activity and hyperreligiousity. Concerned for risk to self given severity of despair, expressed desire to die, Druze conviction and agitation. Admitted to [facility], where sx stabilized with addition of lithium. Patient without any further suicidal ideation, delusions or mixed sx, but now reporting apathy and sustained depression, also complaining of some cognitive effects and blurry vision that she ties to increase in lithium dose. Reduced lithium from 750 mg to 600 mg while keeping other meds the same. Pt returned today reporting euthymia and fairly stable mood apart from some overshopping, but states this is achronic impulse not clearly related to mood. Continued patient on lithium 600 mg, zyprexa 10, lexapro 5 and klonopin 0.5-1 for 2 weeks. Per  patient has been stable, however patient reporting some dysphoria. Started patient on lamictal and tapering off zyprexa and lexapro, patient reporting good mood, denying depression or manic sx, appearing euthymic. However patient returned 2 weeks later in mid November with high anxiety, insomnia, suggestive of mixed hypomanic state given high level of activity, mild impulsivity and frequent good but labile mood. Started pateint back on zyprexa 2.5 mg qhs with good effect, calmer and euthymic but still with anxiety, obsessive thoughts and insomnia. Increased lamictal to target anxiety, pt increased to 75 mg only rather than 100 mg due to misunderstanding. Has been mostly stable but with periodic breakthroughs of insomnia, depressed mood and racing thoughts which have responded to lamictal increases and temporary increases in zyprexa. Mood in general has been more positive with higher doses of lamictal, but still with breakthrough periods of anxiety, dypshoria and agitation. Due to clear responses to zyprexa and lamictal but not to lithium other than possible help with mood stabilization, have increased zyprexa dose to  7.5 mg qhs permanently and reduced dose of lithium to 450 mg qhs. Patient has continued to have depression with obsessive ruminations without clear evidence of stone or mixed state currently. Started patient on lexapro 2.5 mg daily with mild benefit to mood, no sign currently of activation. Gradually increased to 10 mg daily with resulting improvement in depression. Pt returns for follow up reporting euthymia and stable mood, with only mild Mandaeism ruminations about salvation (chronic but much improved today) and insomnia which responds to combination of zyprexa and klonopin..    DIAGNOSES  Bipolar Disorder NOS, most recent episode depressed.    PLAN  1. Continue lexapro 10 mg daily.  2. Stop lamictal  3. Continue zyprexa 10  mg at bedtime for mood stabilization and insomnia. Patient particularly responsive to this medications. Consider transitioning to latuda or seroquel if side effects emerge.  4. Continue lithium 450 mg qhs. Has had partial but incomplete benefit for mood stabilization, little benefit for depression.Consider slow taper off in future if mood remains stable. .  5. Continue klonopin 0.25-1 mg as needed for insomnia. Counseled not to take at same time as tramadol or zyprexa.  6. Continue therapy.  7. Return in 4-8 weeks     More than 50% of the time was spent on counseling and coordination of care.  Behavioral counseling, supportive therapy, coordination with  on care

## 2017-06-07 NOTE — PATIENT INSTRUCTIONS
1. Stop lamictal.  2. Continue all other medications at current doses.  3. Return for follow up on July 27th at 8:30am.

## 2017-07-27 ENCOUNTER — OFFICE VISIT (OUTPATIENT)
Dept: PSYCHIATRY | Facility: CLINIC | Age: 67
End: 2017-07-27
Payer: COMMERCIAL

## 2017-07-27 VITALS
HEIGHT: 67 IN | WEIGHT: 124.63 LBS | BODY MASS INDEX: 19.56 KG/M2 | HEART RATE: 80 BPM | SYSTOLIC BLOOD PRESSURE: 121 MMHG | DIASTOLIC BLOOD PRESSURE: 60 MMHG

## 2017-07-27 DIAGNOSIS — F31.76 BIPOLAR DISORDER, IN FULL REMISSION, MOST RECENT EPISODE DEPRESSED: Primary | ICD-10-CM

## 2017-07-27 DIAGNOSIS — F42.9 OBSESSIVE-COMPULSIVE DISORDER, UNSPECIFIED TYPE: ICD-10-CM

## 2017-07-27 PROBLEM — F31.70 BIPOLAR DISORDER IN FULL REMISSION: Status: ACTIVE | Noted: 2017-01-07

## 2017-07-27 PROCEDURE — 3008F BODY MASS INDEX DOCD: CPT | Mod: S$GLB,,, | Performed by: PSYCHIATRY & NEUROLOGY

## 2017-07-27 PROCEDURE — 99999 PR PBB SHADOW E&M-EST. PATIENT-LVL III: CPT | Mod: PBBFAC,,, | Performed by: PSYCHIATRY & NEUROLOGY

## 2017-07-27 PROCEDURE — 99214 OFFICE O/P EST MOD 30 MIN: CPT | Mod: S$GLB,,, | Performed by: PSYCHIATRY & NEUROLOGY

## 2017-07-27 RX ORDER — LITHIUM CARBONATE 300 MG
300 TABLET ORAL NIGHTLY
Qty: 30 TABLET | Refills: 2 | Status: SHIPPED | OUTPATIENT
Start: 2017-07-27 | End: 2017-10-19

## 2017-07-27 NOTE — PATIENT INSTRUCTIONS
1. Continue lexapro 10 mg daily and olanzapine 10 mg at bedtime.  2. Continue klonopin 0.5-1 mg at bedtime as needed.  3. Reduce lithium to 300 mg at bedtime. I sent in a new prescription to your pharmacy for 300 mg tablets. After one month reduce dose to 150 mg at bedtime (1/2 tablet at bedtime).  4. Continue [employer] classes, which will be good for your mood and your back and your anxiety and sleep.  5. Return in 2 months for follow up.

## 2017-07-27 NOTE — PROGRESS NOTES
Ambulatory Psychiatry Established Patient Follow-up Note      Chief Complaint  presents for followup of depression    Time Spent  20 minutes    People Present  Patient    HPI  Feeling good. Still struggling with question of whether she is going to be saved, but better able to resolve. Sleeping okay, will need klonopin 0.5 mg prn to help with sleep. Still walking a few hours per day. Taking barre classes.      ROS   Complete review of systems performed covering Constitutional, Eyes, ENT/Mouth, Cardiovascular, Respiratory, Gastrointestinal, Genitourinary, Musculoskeletal, Skin, Neurologic, Endocrine, and Allergy/Immune. Intermittent back pain. All other systems were negative.    Psych ROS covered elsewhere in note (HPI)    PFSH  Past Medical History reviewed: Yes  Family History reviewed: No  Social History reviewed: Yes  Medications/problem list/allergies reviewed: Yes    Medications  zyprexa 10  mg at bedtime.  Klonopin 0.5 mg qhs  Lithium 450 mg qhs  Lexapro 10 mg daily    Allergies  Review of patient's allergies indicates:  No Known Allergies    EXAM  VITALS     RELEVANT LABS/STUDIES:  Lithium level   Order: [id number]   Status:  Final result Visible to patient:  No (Not Released) Next appt:  None         Ref Range & Units 13d ago  (9/24/16) 3wk ago  (9/10/16) 1mo ago  (9/5/16) 1mo ago  (9/3/16)    Lithium Lvl 0.6 - 1.2 mmol/L 0.7 0.8 0.5 (L) 0.4 (L)   [facility]ing Agency  [facility]      Specimen Collected: 09/24/16  9:48 AM Last Resulted: 09/24/16 10:25 AM Lab Flowsheet Order Details View Encounter Lab and Collection Details Routing Result History           TSH   Order: [id number]   Status:  Final result Visible to patient:  No (Not Released) Next appt:  None         Ref Range & Units 13d ago   1mo ago   5yr ago      TSH 0.400 - 4.000 uIU/mL 0.997 0.868 1.02R   Resulting Agency  [facility]      Specimen Collected: 09/24/16  2:05 PM Last Resulted: 09/24/16  3:13 PM Lab Flowsheet Order Details View  Encounter Lab and Collection Details Routing Result              PTH, intact   Order: [id number]   Status:  Final result Visible to patient:  No (Not Released) Next appt:  None      Ref Range & Units 13d ago     PTH, Intact 9.0 - 77.0 pg/mL 148.0 (H)   Resulting Agency  [facility]      Specimen Collected: 09/24/16  2:05 PM Last Resulted: 09/24/16  2:58 PM Lab Flowsheet Order Details View Encounter Lab and Collection Details Routing Result History           CBC auto differential   Order: [id number]   Status:  Final result Visible to patient:  No (Not Released) Next appt:  None         Ref Range & Units 12d ago   13d ago   1mo ago   5yr ago      WBC 3.90 - 12.70 K/uL 11.70 15.14 (H) 6.52 4.68 (L)R    RBC 4.00 - 5.40 M/uL 4.00 4.57 4.51 4.22    Hemoglobin 12.0 - 16.0 g/dL 10.1 (L) 11.8 (L) 10.3 (L) 13.0R    Hematocrit 37.0 - 48.5 % 33.1 (L) 37.2 35.4 (L) 37.7    MCV 82 - 98 fL 83 81 (L) 79 (L) 89.3R    MCH 27.0 - 31.0 pg 25.3 (L) 25.8 (L) 22.8 (L) 30.8R    MCHC 32.0 - 36.0 % 30.5 (L) 31.7 (L) 29.1 (L) 34.5R    RDW 11.5 - 14.5 % 21.6 (H) 21.0 (H) 16.7 (H) 12.6    Platelets 150 - 350 K/uL 222 244 276 224    MPV 9.2 - 12.9 fL 10.4 10.1 9.4 10.2    Gran # 1.8 - 7.7 K/uL 9.7 (H) 13.6 (H) 4.6 1.9    Lymph # 1.0 - 4.8 K/uL 1.2 0.7 (L) 1.1 2.0R    Mono # 0.3 - 1.0 K/uL 0.6 0.7 0.6 0.4R    Eos # 0.0 - 0.5 K/uL 0.2 0.0 0.2 0.3R    Baso # 0.00 - 0.20 K/uL 0.03 0.02 0.02 0.0R    Gran% 38.0 - 73.0 % 82.8 (H) 90.1 (H) 70.1 41.4R    Lymph% 18.0 - 48.0 % 10.5 (L) 4.8 (L) 16.9 (L) 42.3R    Mono% 4.0 - 15.0 % 4.9 4.6 9.0 9.4 (H)R    Eosinophil% 0.0 - 8.0 % 1.3 0.1 3.5 6.0 (H)R    Basophil% 0.0 - 1.9 % 0.3 0.1 0.3 0.9R    Differential Method  Automated Automated Automated    Resulting Agency  [facility]      Specimen Collected: 09/25/16  5:44 AM Last Resulted: 09/25/16  7:26 AM Lab Flowsheet Order Details View Encounter Lab and Collection Details Routing Result History      R=Reference range differs from displayed range              Basic metabolic panel   Order: [id number]   Status:  Final result Visible to patient:  No (Not Released) Next appt:  None         Ref Range & Units 12d ago   13d ago   3wk ago   1mo ago      Sodium 136 - 145 mmol/L 142 141 140 144    Potassium 3.5 - 5.1 mmol/L 3.7 4.0CM 4.1 4.3    Chloride 95 - 110 mmol/L 113 (H) 108 112 (H) 111 (H)    CO2 23 - 29 mmol/L 23 22 (L) 21 (L) 26    Glucose 70 - 110 mg/dL 113 (H) 126 (H) 177 (H) 98    BUN, Bld 8 - 23 mg/dL 15 18 11 20    Creatinine 0.5 - 1.4 mg/dL 0.7 0.8 0.8 0.7    Calcium 8.7 - 10.5 mg/dL 7.9 (L) 8.4 (L) 8.6 (L) 9.1    Anion Gap 8 - 16 mmol/L 6 (L) 11 7 (L) 7 (L)    eGFR if African American >60 mL/min/1.73 m^2 >60.0 >60.0 >60.0 >60.0    eGFR if non African American >60 mL/min/1.73 m^2 >60.0 >60.0CM >60.0CM >60.0CM   Comments: Calculation used to obtain the estimated glomerular filtration   rate (eGFR) is the CKD-EPI equation. Since race is unknown   in our information system, the eGFR values for   -American and Non--American patients are given   for each creatinine result.      Resulting Agency  [facility]      Specimen Collected: 09/25/16  5:44 AM Last Resulted: 09/25/16  6:22 AM Lab Flowsheet Order Details View Encounter Lab and Collection Details Routing Result History      CM=Additional comments               PSYCHIATRIC EXAMINATION  Appearance: well groomed, appearing healthy and of stated age, thin, dressed in athletic clothes    Behavior: cooperative, pleasant, no psychomotor agitation or retardation  Speech: normal rate, rhythm, volume and amount  Mood:good  Affect:brighter  Thought Process: linear and organized.  Thought Content: negative for delusions or suicidal ideations or hallucintaiton. Obsessive relgious thoughts improving  Associations: intact  Memory: grossly intact.  Level of Consciousness/Orientation: grossly intact  Fund of Knowledge: good  Attention: good  Language: fluent, naming intact  Insight: fair  Judgment:  fair    Neurological signs: no involuntary movements or tremor  Gait: normal    Medical Decision Making    IMPRESSION   65 yo  retired woman with past psych hx significant for seasonal depressive episodes, anxiety and alcohol use disorder in sustained remission, off antidepressants for past several years, now presenting with one month hx of depressed mood associated with hyperreligious obsessions. Patient admitted to [facility] for treatment of major depressive episode with psychosis and had partial response to combination of lexapro 20 mg and zyprexa 7.5 mg at bedtime. Pt on follow up on 8/11 denied current hyperreligiousity or obsessiveness but still reports depression. Continued on lexapro 20 mg and increased dose of zyprexa 10 mg, pt returned less than one week later with several days of hyperactivity, severe insomnia and restlessness with euthymic mood, on exam affect bright and mildly expansive. On review of remote records, patient with periods of elevated mood possibly coincing with past episodes of substance use, prior diagnosis of bipolar disorder for periods of insomnia and hyperactivity. +Family hx (daughter with bipolar), as well as mixed features to prior depressions (delusions, hyperreligiousity, obsessiveness) consistent with bipolar II disorder. Then reduced lexapro to 10 mg, started on klonopin for sleep and continued zyprexa at 10 mg. Patient returned one week later on 8/25 with lower mood, variable energy but still appearing  mildly agitated, having incongruent affect and hyperreligious on exam. Of note pt had decreased zyprexa fromo 10 mg to 3.75 mg several days before due to concern for mild transaminitis and lower energy. Attempted crosstaper of zyprexa to risperdal but patient resumed zyprexa due to continued extremes mood swings and insomnia. Continued to taper off lexapro. Patient returned 2 weeks ago dysphoric and suicidal as well as with psychomotor agitation, increased goal directed  activity and hyperreligiousity. Concerned for risk to self given severity of despair, expressed desire to die, Mor[employer] conviction and agitation. [facility]mitted to [facility], where sx stabilized with addition of lithium. Patient without any further suicidal ideation, delusions or mixed sx, but now reporting apathy and sustained depression, also complaining of some cognitive effects and blurry vision that she ties to increase in lithium dose. Reduced lithium from 750 mg to 600 mg while keeping other meds the same. Pt returned today reporting euthymia and fairly stable mood apart from some overshopping, but states this is achronic impulse not clearly related to mood. Continued patient on lithium 600 mg, zyprexa 10, lexapro 5 and klonopin 0.5-1 for 2 weeks. Per  patient has been stable, however patient reporting some dysphoria. Started patient on lamictal and tapering off zyprexa and lexapro, patient reporting good mood, denying depression or manic sx, appearing euthymic. However patient returned 2 weeks later in mid November with high anxiety, insomnia, suggestive of mixed hypomanic state given high level of activity, mild impulsivity and frequent good but labile mood. Started pateint back on zyprexa 2.5 mg qhs with good effect, calmer and euthymic but still with anxiety, obsessive thoughts and insomnia. Increased lamictal to target anxiety, pt increased to 75 mg only rather than 100 mg due to misunderstanding. Has been mostly stable but with periodic breakthroughs of insomnia, depressed mood and racing thoughts which have responded to lamictal increases and temporary increases in zyprexa. Mood in general has been more positive with higher doses of lamictal, but still with breakthrough periods of anxiety, dypshoria and agitation. Due to clear responses to zyprexa and lamictal but not to lithium other than possible help with mood stabilization, have increased zyprexa dose to 7.5 mg qhs permanently and reduced dose of  lithium to 450 mg qhs. Patient has continued to have depression with obsessive ruminations without clear evidence of stone or mixed state currently. Started patient on lexapro 2.5 mg daily with mild benefit to mood, no sign currently of activation. Gradually increased to 10 mg daily with resulting improvement in depression. Pt returns for follow up reporting euthymia and stable mood, with only mild Tenriism ruminations about salvation (chronic but much improved today) and insomnia which responds to combination of zyprexa and klonopin.    DIAGNOSES  Bipolar Disorder NOS, most recent episode depressed.    PLAN  1. Continue lexapro 10 mg daily.  2. Continue zyprexa 10  mg at bedtime for mood stabilization and insomnia. Patient particularly responsive to this medications. Consider transitioning to latuda or seroquel if side effects emerge.  3. Attempt taper off of lithium. Reduce dose to 300 mg qhs. Has had partial but incomplete benefit for mood stabilization, little benefit for depression.Will plan gradual taper by 150 mg.   4. Continue klonopin 0.25-1 mg as needed for insomnia. Counseled not to take at same time as tramadol or zyprexa.  5.. Continue therapy.  6. Return in 8 weeks     More than 50% of the time was spent on counseling and coordination of care.  Behavioral counseling, supportive therapy, coordination with  on care

## 2017-10-19 ENCOUNTER — OFFICE VISIT (OUTPATIENT)
Dept: PSYCHIATRY | Facility: CLINIC | Age: 67
End: 2017-10-19
Payer: COMMERCIAL

## 2017-10-19 VITALS
BODY MASS INDEX: 20.09 KG/M2 | HEART RATE: 83 BPM | WEIGHT: 128 LBS | HEIGHT: 67 IN | SYSTOLIC BLOOD PRESSURE: 130 MMHG | DIASTOLIC BLOOD PRESSURE: 63 MMHG

## 2017-10-19 DIAGNOSIS — F31.9 BIPOLAR I DISORDER, CURRENT EPISODE DEPRESSED: ICD-10-CM

## 2017-10-19 PROCEDURE — 99214 OFFICE O/P EST MOD 30 MIN: CPT | Mod: S$GLB,,, | Performed by: PSYCHIATRY & NEUROLOGY

## 2017-10-19 PROCEDURE — 99999 PR PBB SHADOW E&M-EST. PATIENT-LVL III: CPT | Mod: PBBFAC,,, | Performed by: PSYCHIATRY & NEUROLOGY

## 2017-10-19 RX ORDER — CLONAZEPAM 1 MG/1
.5-1 TABLET ORAL NIGHTLY PRN
Qty: 30 TABLET | Refills: 3 | Status: SHIPPED | OUTPATIENT
Start: 2017-10-19 | End: 2018-04-30 | Stop reason: SDUPTHER

## 2017-10-19 RX ORDER — OLANZAPINE 2.5 MG/1
2.5-5 TABLET ORAL NIGHTLY
Qty: 60 TABLET | Refills: 3 | Status: SHIPPED | OUTPATIENT
Start: 2017-10-19 | End: 2018-10-19

## 2017-10-19 RX ORDER — ESCITALOPRAM OXALATE 10 MG/1
15 TABLET ORAL DAILY
Qty: 45 TABLET | Refills: 3 | Status: SHIPPED | OUTPATIENT
Start: 2017-10-19 | End: 2018-04-24 | Stop reason: SDUPTHER

## 2017-10-19 RX ORDER — OLANZAPINE 10 MG/1
10 TABLET ORAL NIGHTLY
Qty: 30 TABLET | Refills: 3 | Status: SHIPPED | OUTPATIENT
Start: 2017-10-19 | End: 2018-02-11 | Stop reason: SDUPTHER

## 2017-10-19 NOTE — PROGRESS NOTES
Ambulatory Psychiatry Established Patient Follow-up Note      Chief Complaint  presents for followup of depression    Time Spent  20 minutes    People Present  Patient    HPI  Depressed for past 3 weeks.Sleeping 8 hours at night. Appetite is up.  Walks 6 miles a day and does pure barre daily. Thinking about salvation excessively. Does not want to do anything. Not enjoying things, not wanting to wear make up or get dressed.     Stopped lithium 3 or 4 weeks ago after taper.     ROS   Complete review of systems performed covering Constitutional, Eyes, ENT/Mouth, Cardiovascular, Respiratory, Gastrointestinal, Genitourinary, Musculoskeletal, Skin, Neurologic, Endocrine, and Allergy/Immune. Intermittent back pain. All other systems were negative.    Psych ROS covered elsewhere in note (HPI)    PFSH  Past Medical History reviewed: Yes  Family History reviewed: No  Social History reviewed: Yes  Medications/problem list/allergies reviewed: Yes    Medications  zyprexa 10  mg at bedtime.  Klonopin 0.5 mg qhs  Lithium 450 mg qhs  Lexapro 10 mg daily    Allergies  Review of patient's allergies indicates:  No Known Allergies    EXAM  VITALS     RELEVANT LABS/STUDIES:  Lithium level   Order: [id number]   Status:  Final result Visible to patient:  No (Not Released) Next appt:  None         Ref Range & Units 13d ago  (9/24/16) 3wk ago  (9/10/16) 1mo ago  (9/5/16) 1mo ago  (9/3/16)    Lithium Lvl 0.6 - 1.2 mmol/L 0.7 0.8 0.5 (L) 0.4 (L)   [facility]ing Agency  [facility]      Specimen Collected: 09/24/16  9:48 AM Last Resulted: 09/24/16 10:25 AM Lab Flowsheet Order Details View Encounter Lab and Collection Details Routing Result History           TSH   Order: [id number]   Status:  Final result Visible to patient:  No (Not Released) Next appt:  None         Ref Range & Units 13d ago   1mo ago   5yr ago      TSH 0.400 - 4.000 uIU/mL 0.997 0.868 1.02R   Resulting Agency  [facility]      Specimen Collected: 09/24/16  2:05 PM  Last Resulted: 09/24/16  3:13 PM Lab Flowsheet Order Details View Encounter Lab and Collection Details Routing Result              PTH, intact   Order: [id number]   Status:  Final result Visible to patient:  No (Not Released) Next appt:  None      Ref Range & Units 13d ago     PTH, Intact 9.0 - 77.0 pg/mL 148.0 (H)   Resulting Agency  [facility]      Specimen Collected: 09/24/16  2:05 PM Last Resulted: 09/24/16  2:58 PM Lab Flowsheet Order Details View Encounter Lab and Collection Details Routing Result History           CBC auto differential   Order: [id number]   Status:  Final result Visible to patient:  No (Not Released) Next appt:  None         Ref Range & Units 12d ago   13d ago   1mo ago   5yr ago      WBC 3.90 - 12.70 K/uL 11.70 15.14 (H) 6.52 4.68 (L)R    RBC 4.00 - 5.40 M/uL 4.00 4.57 4.51 4.22    Hemoglobin 12.0 - 16.0 g/dL 10.1 (L) 11.8 (L) 10.3 (L) 13.0R    Hematocrit 37.0 - 48.5 % 33.1 (L) 37.2 35.4 (L) 37.7    MCV 82 - 98 fL 83 81 (L) 79 (L) 89.3R    MCH 27.0 - 31.0 pg 25.3 (L) 25.8 (L) 22.8 (L) 30.8R    MCHC 32.0 - 36.0 % 30.5 (L) 31.7 (L) 29.1 (L) 34.5R    RDW 11.5 - 14.5 % 21.6 (H) 21.0 (H) 16.7 (H) 12.6    Platelets 150 - 350 K/uL 222 244 276 224    MPV 9.2 - 12.9 fL 10.4 10.1 9.4 10.2    Gran # 1.8 - 7.7 K/uL 9.7 (H) 13.6 (H) 4.6 1.9    Lymph # 1.0 - 4.8 K/uL 1.2 0.7 (L) 1.1 2.0R    Mono # 0.3 - 1.0 K/uL 0.6 0.7 0.6 0.4R    Eos # 0.0 - 0.5 K/uL 0.2 0.0 0.2 0.3R    Baso # 0.00 - 0.20 K/uL 0.03 0.02 0.02 0.0R    Gran% 38.0 - 73.0 % 82.8 (H) 90.1 (H) 70.1 41.4R    Lymph% 18.0 - 48.0 % 10.5 (L) 4.8 (L) 16.9 (L) 42.3R    Mono% 4.0 - 15.0 % 4.9 4.6 9.0 9.4 (H)R    Eosinophil% 0.0 - 8.0 % 1.3 0.1 3.5 6.0 (H)R    Basophil% 0.0 - 1.9 % 0.3 0.1 0.3 0.9R    Differential Method  Automated Automated Automated    Resulting Agency  [facility]      Specimen Collected: 09/25/16  5:44 AM Last Resulted: 09/25/16  7:26 AM Lab Flowsheet Order Details View Encounter Lab and Collection Details Routing Result History       R=Reference range differs from displayed range             Basic metabolic panel   Order: [id number]   Status:  Final result Visible to patient:  No (Not Released) Next appt:  None         Ref Range & Units 12d ago   13d ago   3wk ago   1mo ago      Sodium 136 - 145 mmol/L 142 141 140 144    Potassium 3.5 - 5.1 mmol/L 3.7 4.0CM 4.1 4.3    Chloride 95 - 110 mmol/L 113 (H) 108 112 (H) 111 (H)    CO2 23 - 29 mmol/L 23 22 (L) 21 (L) 26    Glucose 70 - 110 mg/dL 113 (H) 126 (H) 177 (H) 98    BUN, Bld 8 - 23 mg/dL 15 18 11 20    Creatinine 0.5 - 1.4 mg/dL 0.7 0.8 0.8 0.7    Calcium 8.7 - 10.5 mg/dL 7.9 (L) 8.4 (L) 8.6 (L) 9.1    Anion Gap 8 - 16 mmol/L 6 (L) 11 7 (L) 7 (L)    eGFR if African American >60 mL/min/1.73 m^2 >60.0 >60.0 >60.0 >60.0    eGFR if non African American >60 mL/min/1.73 m^2 >60.0 >60.0CM >60.0CM >60.0CM   Comments: Calculation used to obtain the estimated glomerular filtration   rate (eGFR) is the CKD-EPI equation. Since race is unknown   in our information system, the eGFR values for   -American and Non--American patients are given   for each creatinine result.      Resulting Agency  [facility]      Specimen Collected: 09/25/16  5:44 AM Last Resulted: 09/25/16  6:22 AM Lab Flowsheet Order Details View Encounter Lab and Collection Details Routing Result History      CM=Additional comments               PSYCHIATRIC EXAMINATION  Appearance: well groomed, appearing healthy and of stated age, thin, dressed in athletic clothes    Behavior: cooperative, pleasant, no psychomotor agitation or retardation  Speech: normal rate, rhythm, volume and amount  Mood:good  Affect:brighter  Thought Process: linear and organized.  Thought Content: negative for delusions or suicidal ideations or hallucintaiton. Obsessive relgious thoughts improving  Associations: intact  Memory: grossly intact.  Level of Consciousness/Orientation: grossly intact  Fund of Knowledge: good  Attention:  good  Language: fluent, naming intact  Insight: fair  Judgment: fair    Neurological signs: no involuntary movements or tremor  Gait: normal    Medical Decision Making    IMPRESSION   65 yo  retired woman with past psych hx significant for seasonal depressive episodes, anxiety and alcohol use disorder in sustained remission, off antidepressants for past several years, now presenting with one month hx of depressed mood associated with hyperreligious obsessions. Patient admitted to [facility] for treatment of major depressive episode with psychosis and had partial response to combination of lexapro 20 mg and zyprexa 7.5 mg at bedtime. Pt on follow up on 8/11 denied current hyperreligiousity or obsessiveness but still reports depression. Continued on lexapro 20 mg and increased dose of zyprexa 10 mg, pt returned less than one week later with several days of hyperactivity, severe insomnia and restlessness with euthymic mood, on exam affect bright and mildly expansive. On review of remote records, patient with periods of elevated mood possibly coincing with past episodes of substance use, prior diagnosis of bipolar disorder for periods of insomnia and hyperactivity. +Family hx (daughter with bipolar), as well as mixed features to prior depressions (delusions, hyperreligiousity, obsessiveness) consistent with bipolar II disorder. Then reduced lexapro to 10 mg, started on klonopin for sleep and continued zyprexa at 10 mg. Patient returned one week later on 8/25 with lower mood, variable energy but still appearing  mildly agitated, having incongruent affect and hyperreligious on exam. Of note pt had decreased zyprexa fromo 10 mg to 3.75 mg several days before due to concern for mild transaminitis and lower energy. Attempted crosstaper of zyprexa to risperdal but patient resumed zyprexa due to continued extremes mood swings and insomnia. Continued to taper off lexapro. Patient returned 2 weeks ago dysphoric and suicidal as  well as with psychomotor agitation, increased goal directed activity and hyperreligiousity. Concerned for risk to self given severity of despair, expressed desire to die, L[employer] day conviction and agitation. Admitted to [facility], where sx stabilized with addition of lithium. Patient without any further suicidal ideation, delusions or mixed sx, but now reporting apathy and sustained depression, also complaining of some cognitive effects and blurry vision that she ties to increase in lithium dose. Reduced lithium from 750 mg to 600 mg while keeping other meds the same. Pt returned today reporting euthymia and fairly stable mood apart from some overshopping, but states this is achronic impulse not clearly related to mood. Continued patient on lithium 600 mg, zyprexa 10, lexapro 5 and klonopin 0.5-1 for 2 weeks. Per  patient has been stable, however patient reporting some dysphoria. Started patient on lamictal and tapering off zyprexa and lexapro, patient reporting good mood, denying depression or manic sx, appearing euthymic. However patient returned 2 weeks later in mid November with high anxiety, insomnia, suggestive of mixed hypomanic state given high level of activity, mild impulsivity and frequent good but labile mood. Started pateint back on zyprexa 2.5 mg qhs with good effect, calmer and euthymic but still with anxiety, obsessive thoughts and insomnia. Increased lamictal to target anxiety, pt increased to 75 mg only rather than 100 mg due to misunderstanding. Has been mostly stable but with periodic breakthroughs of insomnia, depressed mood and racing thoughts which have responded to lamictal increases and temporary increases in zyprexa. Mood in general has been more positive with higher doses of lamictal, but still with breakthrough periods of anxiety, dypshoria and agitation. Due to clear responses to zyprexa and lamictal but not to lithium other than possible help with mood stabilization, have increased  zyprexa dose to 7.5 mg qhs permanently and reduced dose of lithium to 450 mg qhs. Patient has continued to have depression with obsessive ruminations without clear evidence of stone or mixed state currently. Started patient on lexapro 2.5 mg daily with mild benefit to mood, no sign currently of activation. Gradually increased to 10 mg daily with resulting improvement in depression. Pt returned for follow up in summer reporting euthymia and stable mood, with only mild [place] ruminations about salvation (chronic but much improved today) and insomnia which responds to combination of zyprexa and klonopin. Subsequently tapered off of lithium. Pt returns today, reporting depressed mood again, although less severe depression than last year, and with no psychotic sx. Question of depression being related to lithium taper but patient's depression never appeared to respond to lithium in the past, rather lithium was somewhat effective for mixed/manic sx.     DIAGNOSES  Bipolar Disorder NOS, most recent episode depressed.    PLAN  1. Increase lexapro to 15 mg daily.  2. Continue zyprexa 10  mg at bedtime for mood stabilization and insomnia. Patient particularly responsive to this medications. Consider transitioning to latuda or seroquel if side effects emerge. Has additional prescription for 2.5 mg to take as needed for agitation.   3. Consider restarting lithium.   4. Continue klonopin 0.25-0.5 mg as needed for insomnia. Counseled not to take at same time as tramadol or zyprexa. Counseled to use as little as possible. Also encouraged her to use alternative agent to tramadol since she is now taking more regularly and tramadol can cause depression.   5.. Continue therapy.  6. Return in 6-8 weeks     More than 50% of the time was spent on counseling and coordination of care.  Behavioral counseling, supportive therapy

## 2017-10-19 NOTE — PATIENT INSTRUCTIONS
1. Increase lexapro to 15 mg daily, take 1 1/2 tablets daily.  2. Continue zyprexa 10 mg at bedtime.  3. Sent in a new prescription for zyprexa 2.5 mg tablets that you can take 1-2 of as needed for mood sx or anxiety.  4. See if you can reduce klonopin to 0.25 mg at bedtime (1/4 of a tablet). Take only as needed for sleep.  5. Consider non-opioid alternatives like nonsteroidal anti-inflammatories to address back pain, since tramadol can cause depression.   6. Return in 6-8 weeks for follow up, message me if having any problems.

## 2018-02-11 DIAGNOSIS — F31.9 BIPOLAR I DISORDER, CURRENT EPISODE DEPRESSED: ICD-10-CM

## 2018-02-12 RX ORDER — OLANZAPINE 10 MG/1
TABLET ORAL
Qty: 30 TABLET | Refills: 3 | Status: SHIPPED | OUTPATIENT
Start: 2018-02-12 | End: 2018-06-26 | Stop reason: SDUPTHER

## 2018-04-24 DIAGNOSIS — F31.9 BIPOLAR I DISORDER, CURRENT EPISODE DEPRESSED: ICD-10-CM

## 2018-04-24 RX ORDER — ESCITALOPRAM OXALATE 10 MG/1
TABLET ORAL
Qty: 45 TABLET | Refills: 3 | Status: SHIPPED | OUTPATIENT
Start: 2018-04-24 | End: 2018-06-06

## 2018-04-30 DIAGNOSIS — F31.9 BIPOLAR I DISORDER, CURRENT EPISODE DEPRESSED: ICD-10-CM

## 2018-04-30 RX ORDER — CLONAZEPAM 1 MG/1
.5-1 TABLET ORAL NIGHTLY PRN
Qty: 30 TABLET | Refills: 0 | Status: SHIPPED | OUTPATIENT
Start: 2018-04-30 | End: 2018-07-02 | Stop reason: SDUPTHER

## 2018-05-16 ENCOUNTER — OFFICE VISIT (OUTPATIENT)
Dept: PSYCHIATRY | Facility: CLINIC | Age: 68
End: 2018-05-16
Payer: COMMERCIAL

## 2018-05-16 DIAGNOSIS — F31.70 BIPOLAR DISORDER, CURRENTLY IN REMISSION, MOST RECENT EPISODE UNSPECIFIED: ICD-10-CM

## 2018-05-16 DIAGNOSIS — F11.10 OPIOID USE DISORDER, MILD, ABUSE: ICD-10-CM

## 2018-05-16 DIAGNOSIS — F10.20 ALCOHOL USE DISORDER, SEVERE, DEPENDENCE: Primary | ICD-10-CM

## 2018-05-16 PROCEDURE — 90839 PSYTX CRISIS INITIAL 60 MIN: CPT | Mod: S$GLB,,, | Performed by: PSYCHIATRY & NEUROLOGY

## 2018-05-16 PROCEDURE — 99214 OFFICE O/P EST MOD 30 MIN: CPT | Mod: S$GLB,,, | Performed by: PSYCHIATRY & NEUROLOGY

## 2018-05-16 NOTE — PROGRESS NOTES
Ambulatory Psychiatry Established Patient Follow-up Note      Chief Complaint  presents for followup of depression    Time Spent  60 minutes total    People Present  Patient and her     HPI  Admits to relapsing 2 months on alcohol and tramadol. Will take out 6 or 7 tramadol from bottle after she fills it from pharmacy and before she gives it to  who dispenses her meds. Will take all of them at once then. Also has been drinking daily, now up to a 1/5th of whiskey daily. When I expressed concern that she may be intoxicated now, even though driving herself here, she shrugs this off, saying she can't be drunk because she sips it all day long. Has been driving while intoxicated, admits that its out of control but denies adverse consequences. However she knows her family would be upset if they found out. Attributes relapse to all the stress, she and her daughter planned a last minute wedding, with heavy planning and arranging for the past few months. [place] is this weekend. When I expressed my concern about her drinking and driving and recommended detox and rehab, patient said she can't do it because of the wedding and couldn't not drive because she needs to get her hair done and manicures done. Emphasized that I would not be able to let her leave while intoxicated. Checked BAL during appointment which was 0.128. Encouraged her to let me call her family. Spoke with daughter and with .  agreed to come get her.    Spoke again with patient and  after  arrived to pick her up. He states that he did not know for certain but had his suspicions. Notes some confused behavior, higher anxiety and at times aggression. No clear stone or depression apart from the wedding planning, still with chronic insomnia. Has not been attending therapy or walking like she used to.  He agreed to keep an eye on her until after the wedding and to prvent her from driving. Agreed to have her follow up with  inpatient or IOP treatment after the wedding this weekend. Made plans for patient to start ABU on Monday following detox with klonopin.      ROS   Complete review of systems performed covering Constitutional, Eyes, ENT/Mouth, Cardiovascular, Respiratory, Gastrointestinal, Genitourinary, Musculoskeletal, Skin, Neurologic, Endocrine, and Allergy/Immune. Intermittent back pain. All other systems were negative.    Psych ROS covered elsewhere in note (HPI)    PFSH  Past Medical History reviewed: Yes  Family History reviewed: No  Social History reviewed: Yes  Medications/problem list/allergies reviewed: Yes    Medications  zyprexa 10  mg at bedtime.  Klonopin 0.5 mg qhs  Lexapro 15 mg daily    Allergies  Review of patient's allergies indicates:  No Known Allergies    EXAM  VITALS     RELEVANT LABS/STUDIES:  Lithium level   Order: [id number]   Status:  Final result Visible to patient:  No (Not Released) Next appt:  None         Ref Range & Units 13d ago  (9/24/16) 3wk ago  (9/10/16) 1mo ago  (9/5/16) 1mo ago  (9/3/16)    Lithium Lvl 0.6 - 1.2 mmol/L 0.7 0.8 0.5 (L) 0.4 (L)   Resulting Agency  [facility]      Specimen Collected: 09/24/16  9:48 AM Last Resulted: 09/24/16 10:25 AM Lab Flowsheet Order Details View Encounter Lab and Collection Details Routing Result History           TSH   Order: [id number]   Status:  Final result Visible to patient:  No (Not Released) Next appt:  None         Ref Range & Units 13d ago   1mo ago   5yr ago      TSH 0.400 - 4.000 uIU/mL 0.997 0.868 1.02R   Resulting Agency  [facility]      Specimen Collected: 09/24/16  2:05 PM Last Resulted: 09/24/16  3:13 PM Lab Flowsheet Order Details View Encounter Lab and Collection Details Routing Result              PTH, intact   Order: [id number]   Status:  Final result Visible to patient:  No (Not Released) Next appt:  None      Ref Range & Units 13d ago     PTH, Intact 9.0 - 77.0 pg/mL 148.0 (H)   Resulting Agency  [facility]      Specimen  Collected: 09/24/16  2:05 PM Last Resulted: 09/24/16  2:58 PM Lab Flowsheet Order Details View Encounter Lab and Collection Details Routing Result History           CBC auto differential   Order: [id number]   Status:  Final result Visible to patient:  No (Not Released) Next appt:  None         Ref Range & Units 12d ago   13d ago   1mo ago   5yr ago      WBC 3.90 - 12.70 K/uL 11.70 15.14 (H) 6.52 4.68 (L)R    RBC 4.00 - 5.40 M/uL 4.00 4.57 4.51 4.22    Hemoglobin 12.0 - 16.0 g/dL 10.1 (L) 11.8 (L) 10.3 (L) 13.0R    Hematocrit 37.0 - 48.5 % 33.1 (L) 37.2 35.4 (L) 37.7    MCV 82 - 98 fL 83 81 (L) 79 (L) 89.3R    MCH 27.0 - 31.0 pg 25.3 (L) 25.8 (L) 22.8 (L) 30.8R    MCHC 32.0 - 36.0 % 30.5 (L) 31.7 (L) 29.1 (L) 34.5R    RDW 11.5 - 14.5 % 21.6 (H) 21.0 (H) 16.7 (H) 12.6    Platelets 150 - 350 K/uL 222 244 276 224    MPV 9.2 - 12.9 fL 10.4 10.1 9.4 10.2    Gran # 1.8 - 7.7 K/uL 9.7 (H) 13.6 (H) 4.6 1.9    Lymph # 1.0 - 4.8 K/uL 1.2 0.7 (L) 1.1 2.0R    Mono # 0.3 - 1.0 K/uL 0.6 0.7 0.6 0.4R    Eos # 0.0 - 0.5 K/uL 0.2 0.0 0.2 0.3R    Baso # 0.00 - 0.20 K/uL 0.03 0.02 0.02 0.0R    Gran% 38.0 - 73.0 % 82.8 (H) 90.1 (H) 70.1 41.4R    Lymph% 18.0 - 48.0 % 10.5 (L) 4.8 (L) 16.9 (L) 42.3R    Mono% 4.0 - 15.0 % 4.9 4.6 9.0 9.4 (H)R    Eosinophil% 0.0 - 8.0 % 1.3 0.1 3.5 6.0 (H)R    Basophil% 0.0 - 1.9 % 0.3 0.1 0.3 0.9R    Differential Method  Automated Automated Automated    Resulting Agency  [facility]      Specimen Collected: 09/25/16  5:44 AM Last Resulted: 09/25/16  7:26 AM Lab Flowsheet Order Details View Encounter Lab and Collection Details Routing Result History      R=Reference range differs from displayed range             Basic metabolic panel   Order: [id number]   Status:  Final result Visible to patient:  No (Not Released) Next appt:  None         Ref Range & Units 12d ago   13d ago   3wk ago   1mo ago      Sodium 136 - 145 mmol/L 142 141 140 144    Potassium 3.5 - 5.1 mmol/L 3.7 4.0CM 4.1 4.3    Chloride  95 - 110 mmol/L 113 (H) 108 112 (H) 111 (H)    CO2 23 - 29 mmol/L 23 22 (L) 21 (L) 26    Glucose 70 - 110 mg/dL 113 (H) 126 (H) 177 (H) 98    BUN, Bld 8 - 23 mg/dL 15 18 11 20    Creatinine 0.5 - 1.4 mg/dL 0.7 0.8 0.8 0.7    Calcium 8.7 - 10.5 mg/dL 7.9 (L) 8.4 (L) 8.6 (L) 9.1    Anion Gap 8 - 16 mmol/L 6 (L) 11 7 (L) 7 (L)    eGFR if African American >60 mL/min/1.73 m^2 >60.0 >60.0 >60.0 >60.0    eGFR if non African American >60 mL/min/1.73 m^2 >60.0 >60.0CM >60.0CM >60.0CM   Comments: Calculation used to obtain the estimated glomerular filtration   rate (eGFR) is the CKD-EPI equation. Since race is unknown   in our information system, the eGFR values for   -American and Non--American patients are given   for each creatinine result.      Resulting Agency  [facility]      Specimen Collected: 09/25/16  5:44 AM Last Resulted: 09/25/16  6:22 AM Lab Flowsheet Order Details View Encounter Lab and Collection Details Routing Result History      CM=Additional comments             BAL checked with breathalyzer during appointment was 0.128    PSYCHIATRIC EXAMINATION  Appearance: well groomed, appearing  stated age, thin, dressed in athletic clothes, appears more tired    Behavior: fidgety, disinhibted  Speech: slightly slurred  Mood: anxious  Affect: labile, at times laughing, at other times irritable, at other times guilty and dysphoric  Thought Process: linear   Thought Content: negative for delusions or suicidal ideations or hallucintaiton. Associations: intact  Memory: grossly intact.  Level of Consciousness/Orientation: grossly intact  Fund of Knowledge: good  Attention: fair  Language: fluent, naming intact  Insight:impaired, patient denies being intoxicated despite BAL above legal limit  Judgment: impaired regarding substance abuse.     Neurological signs: no involuntary movements or tremor  Gait: normal    Medical Decision Making    IMPRESSION   67 yo  retired woman with past psych hx  significant for seasonal depressive episodes, anxiety and alcohol use disorder in sustained remission, off antidepressants for past several years, now presenting with one month hx of depressed mood associated with hyperreligious obsessions. Patient admitted to [facility] for treatment of major depressive episode with psychosis and had partial response to combination of lexapro 20 mg and zyprexa 7.5 mg at bedtime. Pt on follow up on 8/11 denied current hyperreligiousity or obsessiveness but still reports depression. Continued on lexapro 20 mg and increased dose of zyprexa 10 mg, pt returned less than one week later with several days of hyperactivity, severe insomnia and restlessness with euthymic mood, on exam affect bright and mildly expansive. On review of remote records, patient with periods of elevated mood possibly coincing with past episodes of substance use, prior diagnosis of bipolar disorder for periods of insomnia and hyperactivity. +Family hx (daughter with bipolar), as well as mixed features to prior depressions (delusions, hyperreligiousity, obsessiveness) consistent with bipolar II disorder. Then reduced lexapro to 10 mg, started on klonopin for sleep and continued zyprexa at 10 mg. Patient returned one week later on 8/25 with lower mood, variable energy but still appearing  mildly agitated, having incongruent affect and hyperreligious on exam. Of note pt had decreased zyprexa fromo 10 mg to 3.75 mg several days before due to concern for mild transaminitis and lower energy. Attempted crosstaper of zyprexa to risperdal but patient resumed zyprexa due to continued extremes mood swings and insomnia. Continued to taper off lexapro. Patient returned 2 weeks ago dysphoric and suicidal as well as with psychomotor agitation, increased goal directed activity and hyperreligiousity. Concerned for risk to self given severity of despair, expressed desire to die, Hoahaoism conviction and agitation. Admitted to [facility], where sx  stabilized with addition of lithium. Patient without any further suicidal ideation, delusions or mixed sx, but now reporting apathy and sustained depression, also complaining of some cognitive effects and blurry vision that she ties to increase in lithium dose. Reduced lithium from 750 mg to 600 mg while keeping other meds the same. Pt returned today reporting euthymia and fairly stable mood apart from some overshopping, but states this is achronic impulse not clearly related to mood. Continued patient on lithium 600 mg, zyprexa 10, lexapro 5 and klonopin 0.5-1 for 2 weeks. Per  patient has been stable, however patient reporting some dysphoria. Started patient on lamictal and tapering off zyprexa and lexapro, patient reporting good mood, denying depression or manic sx, appearing euthymic. However patient returned 2 weeks later in mid November with high anxiety, insomnia, suggestive of mixed hypomanic state given high level of activity, mild impulsivity and frequent good but labile mood. Started pateint back on zyprexa 2.5 mg qhs with good effect, calmer and euthymic but still with anxiety, obsessive thoughts and insomnia. Increased lamictal to target anxiety, pt increased to 75 mg only rather than 100 mg due to misunderstanding. Has been mostly stable but with periodic breakthroughs of insomnia, depressed mood and racing thoughts which have responded to lamictal increases and temporary increases in zyprexa. Mood in general has been more positive with higher doses of lamictal, but still with breakthrough periods of anxiety, dypshoria and agitation. Due to clear responses to zyprexa and lamictal but not to lithium other than possible help with mood stabilization, have increased zyprexa dose to 7.5 mg qhs permanently and reduced dose of lithium to 450 mg qhs. Patient has continued to have depression with obsessive ruminations without clear evidence of stone or mixed state currently. Started patient on lexapro  2.5 mg daily with mild benefit to mood, no sign currently of activation. Gradually increased to 10 mg daily with resulting improvement in depression. Pt returned for follow up in summer 2017 reporting euthymia and stable mood, with only mild Muslim ruminations about salvation (chronic but much improved today) and insomnia which responds to combination of zyprexa and klonopin. Subsequently tapered off of lithium. Pt returned in FAll 2017 with worsened mood, mild depressive sx, after which we increased her lexapro to 15 mg. She returns today for follow up after a period of apparent stability today frankly intoxicated with BAL above legal limit, having driven to office, reporting daily heavy use of alcohol and occasional abuse of tramadol. On exam patient appears disinhibited and intoxicated. Of note has been planning a last minute wedding and patient has history of stone/mixed states in the Spring, raising concern for bipolar disorder precipitating her relapse.    DIAGNOSES  Alcohol use Disorder, severe  Bipolar Disorder I, most recent episode unspecified  Opioid Use disorder, moderate    PLAN  -Encouraged them to consider inpatient detox followed by rehab now. However due to daughter's wedding this weekend, they would prefer to wait until next week.  agrees to monitor patient closely, to administer meds, to prevent her from driving and to bring her to hospital if outpatient detox fails.  -Start outpatient detox with klonopin, pt takes 0.5 mg qhs normally. Encouraged  to dispensed 0.5 mg tid for the first day, increasing to 0.5-1 mg tid with 1 mg dosing if withdrawal sx are not responding to 0.5 mg, will then plan to decrease by 0.5 mg each day.   -continue lexapro 15 mg daily and zyprexa 10 mg qhs.  Patient particularly responsive to zyprexa Consider transitioning to latuda or seroquel if side effects emerge. Has additional prescription for 2.5 mg to take as needed for agitation. However Advised   to give klonopin for agitation or anxiety rather than zyprexa.  -stopped tramadol  -will plan to stop klonopin after taper.  -plan for patient to start ABU on MOnday 5/21.  - agrees to monitor patient, keep car keys away from her and to bring her to the hospital if outpatient taper not working.     PSYCHOTHERAPY ADD-ON [id number]   30 (16-37*) minutes    Site: [facility], [address]  Time: 45 minutes  Participants: Met with patient and spouse    Therapeutic Intervention Type: crisis management  Why chosen therapy is appropriate versus another modality: relevant to diagnosis    Target symptoms: alcohol abuse  Primary focus: patient's relapse, need for acute treatment, safety while intoxicated.   Psychotherapeutic techniques: family psychotherapy, behavioral therapy    Outcome monitoring methods: feedback from family.  agtees to not allow patient to drive and will monitor patient.    Patient's response to intervention:  The patient's response to intervention is reluctant.    Progress toward goals:  The patient's progress toward goals is limited.

## 2018-05-20 PROBLEM — F10.20 ALCOHOL USE DISORDER, SEVERE, DEPENDENCE: Status: ACTIVE | Noted: 2018-05-20

## 2018-05-20 PROBLEM — F11.10 OPIOID USE DISORDER, MILD, ABUSE: Status: ACTIVE | Noted: 2018-05-20

## 2018-05-21 ENCOUNTER — LAB VISIT (OUTPATIENT)
Dept: LAB | Facility: HOSPITAL | Age: 68
End: 2018-05-21
Payer: COMMERCIAL

## 2018-05-21 ENCOUNTER — HOSPITAL ENCOUNTER (OUTPATIENT)
Dept: PSYCHIATRY | Facility: HOSPITAL | Age: 68
Discharge: HOME | End: 2018-05-21
Attending: PSYCHIATRY & NEUROLOGY
Payer: COMMERCIAL

## 2018-05-21 VITALS
SYSTOLIC BLOOD PRESSURE: 135 MMHG | DIASTOLIC BLOOD PRESSURE: 79 MMHG | TEMPERATURE: 99 F | RESPIRATION RATE: 20 BRPM | HEIGHT: 67 IN | HEART RATE: 62 BPM

## 2018-05-21 DIAGNOSIS — F10.20 ALCOHOL USE DISORDER, SEVERE, DEPENDENCE: Primary | ICD-10-CM

## 2018-05-21 DIAGNOSIS — F10.20 ALCOHOL USE DISORDER, SEVERE, DEPENDENCE: ICD-10-CM

## 2018-05-21 LAB
ALBUMIN SERPL BCP-MCNC: 3.8 G/DL
ALP SERPL-CCNC: 70 U/L
ALT SERPL W/O P-5'-P-CCNC: 26 U/L
AMPHET+METHAMPHET UR QL: NEGATIVE
ANION GAP SERPL CALC-SCNC: 10 MMOL/L
AST SERPL-CCNC: 25 U/L
BARBITURATES UR QL SCN>200 NG/ML: NEGATIVE
BASOPHILS # BLD AUTO: 0.05 K/UL
BASOPHILS NFR BLD: 0.9 %
BENZODIAZ UR QL SCN>200 NG/ML: NEGATIVE
BILIRUB SERPL-MCNC: 0.4 MG/DL
BREATH ALCOHOL: 0
BUN SERPL-MCNC: 16 MG/DL
BZE UR QL SCN: NEGATIVE
CALCIUM SERPL-MCNC: 9.4 MG/DL
CANNABINOIDS UR QL SCN: NEGATIVE
CHLORIDE SERPL-SCNC: 107 MMOL/L
CO2 SERPL-SCNC: 25 MMOL/L
CREAT SERPL-MCNC: 0.8 MG/DL
CREAT UR-MCNC: 149 MG/DL
DIFFERENTIAL METHOD: ABNORMAL
EOSINOPHIL # BLD AUTO: 0.3 K/UL
EOSINOPHIL NFR BLD: 5.6 %
ERYTHROCYTE [DISTWIDTH] IN BLOOD BY AUTOMATED COUNT: 12.9 %
EST. GFR  (AFRICAN AMERICAN): >60 ML/MIN/1.73 M^2
EST. GFR  (NON AFRICAN AMERICAN): >60 ML/MIN/1.73 M^2
ETHANOL UR-MCNC: <10 MG/DL
FOLATE SERPL-MCNC: 8.2 NG/ML
GLUCOSE SERPL-MCNC: 105 MG/DL
HCT VFR BLD AUTO: 47.4 %
HGB BLD-MCNC: 14.5 G/DL
IMM GRANULOCYTES # BLD AUTO: 0.02 K/UL
IMM GRANULOCYTES NFR BLD AUTO: 0.3 %
LYMPHOCYTES # BLD AUTO: 1.4 K/UL
LYMPHOCYTES NFR BLD: 24.2 %
MCH RBC QN AUTO: 30.6 PG
MCHC RBC AUTO-ENTMCNC: 30.6 G/DL
MCV RBC AUTO: 100 FL
METHADONE UR QL SCN>300 NG/ML: NEGATIVE
MONOCYTES # BLD AUTO: 0.6 K/UL
MONOCYTES NFR BLD: 10.6 %
NEUTROPHILS # BLD AUTO: 3.4 K/UL
NEUTROPHILS NFR BLD: 58.4 %
NRBC BLD-RTO: 0 /100 WBC
OPIATES UR QL SCN: NEGATIVE
PCP UR QL SCN>25 NG/ML: NEGATIVE
PLATELET # BLD AUTO: 268 K/UL
PMV BLD AUTO: 10.2 FL
POTASSIUM SERPL-SCNC: 4.4 MMOL/L
PROT SERPL-MCNC: 7 G/DL
RBC # BLD AUTO: 4.74 M/UL
SODIUM SERPL-SCNC: 142 MMOL/L
TOXICOLOGY INFORMATION: NORMAL
VIT B12 SERPL-MCNC: 750 PG/ML
WBC # BLD AUTO: 5.75 K/UL

## 2018-05-21 PROCEDURE — 80053 COMPREHEN METABOLIC PANEL: CPT

## 2018-05-21 PROCEDURE — 90853 GROUP PSYCHOTHERAPY: CPT | Performed by: SOCIAL WORKER

## 2018-05-21 PROCEDURE — 85025 COMPLETE CBC W/AUTO DIFF WBC: CPT

## 2018-05-21 PROCEDURE — 90853 GROUP PSYCHOTHERAPY: CPT

## 2018-05-21 PROCEDURE — 82746 ASSAY OF FOLIC ACID SERUM: CPT

## 2018-05-21 PROCEDURE — 99223 1ST HOSP IP/OBS HIGH 75: CPT | Mod: ,,, | Performed by: PSYCHIATRY & NEUROLOGY

## 2018-05-21 PROCEDURE — 80307 DRUG TEST PRSMV CHEM ANLYZR: CPT

## 2018-05-21 PROCEDURE — 36415 COLL VENOUS BLD VENIPUNCTURE: CPT

## 2018-05-21 PROCEDURE — 82607 VITAMIN B-12: CPT

## 2018-05-21 PROCEDURE — 90853 GROUP PSYCHOTHERAPY: CPT | Mod: 59,,, | Performed by: PSYCHOLOGIST

## 2018-05-21 RX ORDER — NALTREXONE HYDROCHLORIDE 50 MG/1
50 TABLET, FILM COATED ORAL DAILY
Qty: 30 TABLET | Refills: 0 | Status: SHIPPED | OUTPATIENT
Start: 2018-05-21 | End: 2018-06-20

## 2018-05-21 NOTE — PROGRESS NOTES
Group Psychotherapy (PhD/LCSW)    Site: [facility]    Clinical status of patient: Intensive Outpatient Program (IOP)    Date: 5/21/2018    Group Focus: Psychodynamic Group Therapy      Length of service: 64549 - 45-50 minutes    Number of patients in attendance: 6    Referred by: Addictive Behavior Unit Treatment Team    Target symptoms: Alcohol Abuse    Patient's response to treatment: Active Listening; Self-disclosure     Progress toward goals: Progressing adequately    Interval History: First day on ABU. Pt shared her story with the group.      Diagnosis: Alcohol Use d/o, severe dependence     Plan: Continue treatment on ABU

## 2018-05-21 NOTE — PLAN OF CARE
67 yo  retired woman with past psych hx significant for bipolar I disorder until recently in remission, hx of opioid and alcohol abuse which had been in remission for several years, now presenting to ABU with relapse on alcohol and opioid medications 2-3months ago. Pt reports taking extra of her prescribed tramadol for the past few months. Also reports relapsing on alcohol, progressing to daily use over past few months. Drinking up to a 1/5th of whiskey daily. Will sip on it all day long. Patient cites stress over rapid planning for last minute wedding for her daughter as a possible precipitant. Family noted that she was more irritable and at times confused.  also reports patient leaving burners on while intoxicated. Patient revealed use to psychiatrist Dr Smith at follow up visit last week on 5/16. Of note BAL at that visit was 0.128 and patient had driven to the visit. Patient minimized use and lacked insight into severity. However for safety and treatment planning family was called and patient agreed to enter ABU today after wedding on 5/19 had passed. Patient was provided with klonopin detox on 5/16, started on 0.5 mg tid (of note had been taking 0.5 mg qhs for insomnia until now), and provided plan for further tapering.       Patient with hx of bipolar disorder, with typically depressed mood in the [place]ordon and hypomania in the summers. In 2016 developed severe mixed state with psychosis, consisting of depressed mood, excessive guilt, hyperreligious obsessions and guilty. Symptoms ultimately stabilized on combination of lexapro and zyprexa. Risperdal, lithium and lamictal trials failed. (Lithium helpful for stone but not depression). of note prior stone has been associated with substance use.    Patient needs ABU treatment for her Alcohol use disorder, severe with last drink on 5/16, previously drinking 1/5th per day, use affecting marriage and threatening daughters wedding, patient unable to  control use, uses in dangerous circumstances (while driving, cooking), has interfered with responsibilities (leaving burners on) and affecting mood (hx of bipolar disorder, now with more mood lability per familY). Patient has also relapsed on opioids and needs treatment for this as well. She will need close monitoring due to concurrent bipolar disorder and possible connection of substances to her mood state.

## 2018-05-21 NOTE — PROGRESS NOTES
Group Psychotherapy (PhD/LCSW)    Site: [facility]    Clinical status of patient: Intensive Outpatient Program (IOP)    Date: 5/21/2018    Group Focus: Communication Skills    Length of service: 22865 - 45-50 minutes    Number of patients in attendance: 13    Referred by: Addictive Behavior Unit Treatment Team    Target symptoms: Alcohol Abuse    Patient's response to treatment: Active Listening    Progress toward goals: Progressing adequately    Interval History: Explained basic communication skills (I-messages and Reflective Listening) and modeled their application. Role played their use with current communication challenges facing group members.     Diagnosis: Alcohol Use d/o, severe dependence     Plan: Continue treatment on ABU

## 2018-05-21 NOTE — H&P
"5/21/2018 9:43 AM  Martha Garner  1950  [id number]    [facility] Intensive Outpatient Program  Admission History & Physical    STATUS:  Intensive Outpatient Program (IOP)    SUBJECTIVE:     History of Present Illness:  Martha Garner is a 68 y.o. female presents for admission to ABU today with principal problem of alcohol and opiate use disorder. Psychiatric history notable for bipolar I disorder. Pt followed by Dr. Smith for outpatient psychiatric care. As noted by Dr. Smith:   " 67 yo  retired woman with past psych hx significant for bipolar I disorder until recently in remission, hx of opioid and alcohol abuse which had been in remission for several years, now presenting to ABU with relapse on alcohol and opioid medications 2-3months ago. Pt reports taking extra of her prescribed tramadol for the past few months. Also reports relapsing on alcohol, progressing to daily use over past few months. Drinking up to a 1/5th of whiskey daily. Will sip on it all day long. Patient cites stress over rapid planning for last minute wedding for her daughter as a possible precipitant. Family noted that she was more irritable and at times confused.  also reports patient leaving burners on while intoxicated. Patient revealed use to psychiatrist Dr Smith at follow up visit last week on 5/16. Of note BAL at that visit was 0.128 and patient had driven to the visit. Patient minimized use and lacked insight into severity. However for safety and treatment planning family was called and patient agreed to enter ABU today after wedding on 5/19 had passed. Patient was provided with klonopin detox on 5/16, started on 0.5 mg tid (of note had been taking 0.5 mg qhs for insomnia until now), and provided plan for further tapering.         Patient with hx of bipolar disorder, with typically depressed mood in the [place]ordon and hypomania in the summers. In 2016 developed severe mixed state with psychosis, " "consisting of depressed mood, excessive guilt, hyperreligious obsessions and guilty. Symptoms ultimately stabilized on combination of lexapro and zyprexa. Risperdal, lithium and lamictal trials failed. (Lithium helpful for stone but not depression). of note prior stone has been associated with substance use.     Patient needs ABU treatment for her Alcohol use disorder, severe with last drink on 5/16, previously drinking 1/5th per day, use affecting marriage and threatening daughters wedding, patient unable to control use, uses in dangerous circumstances (while driving, cooking), has interfered with responsibilities (leaving burners on) and affecting mood (hx of bipolar disorder, now with more mood lability per familY). Patient has also relapsed on opioids and needs treatment for this as well. She will need close monitoring due to concurrent bipolar disorder and possible connection of substances to her mood state. "    Interval update:  Pt seen this morning as part of ABU intake process. Pt reports that she is "okay, other than wanting to drink". Pt  first drink was in high school but drinking became a problem about 10 yrs ago. Pt reports that this relapse was related to the it was the level of stress, once she starts she is "off to the races". Pt started drinking again 2 to 2.5 months ago. Pt reports that she drinks up a 1/5th whiskey a day. Also drinking wine and tequila, interchangable to whiskey. Pt reports having cravings, noting that her ability to maintain her usual family duties was impaired, pt noting having hopeless about her ability to stop drinking. Pt admits to drinking and driving, discussed tolerance of not feeling intoxicated last week while BAL was 0.128. Pt reports having some withdrawal symptoms, including mild nausea and tremor.  Last drink was last Wednesday. Pt was advised to start a klonopin detox at last visit, however found recommended dose of klonopin 0.5mg tid  was too much, and instead  " "took 1/4 of  a 1mg pill 2 times a day, last time was 2 days ago. Pt reports that she is back to taking klonopin 0.5mg  at night.  Pt reports that naltrexone was very helpful in reducing cravings in the past, is interested in restarting it.     Pt reports "I'm addicted to tramadol too".  Pt started taking tramadol about 3 yrs ago for back pain,  Reports that she started misusing it within 2 to 3 weeks.  Pt  attempts to help monitor her use by keeping it away from her and distributing prescribed doses, however pt admits that she will pour some out of bottle when she picks it up so that she can take more and will hold on to doses so that she can take them all at once. Pt will take will take up to seven 50mg tramadol  at one time. Last took tramadol yesterday. Pt reports that it is a problem in her marriage and her  daughter also concerned,  admits to forgetfulness associated  with tramadol use. Pt feels that use has gotten out of control. Pt reports " I like the way it feels", euphoric and "sorta a buzz". Little diarrhea associated with decreased doses.     Pt reports that she was depressed during her visit in the fall, denied feeling hypomanic during the spring. Pt report that she was "full of anxiety, angst" which pushed her to start drinking again. Pt denied increased impulsivity, no irritably. Pt had decreased usual activity, reports that energy levels are actually low, endorsed anhedonia, low motivation (stopped going to the grocery store), poor concentration/difficulty with organization. Pt reports hopelessness but it was mostly about her alcohol use. Sleep is consistent with use of klonopin. Pt denied guilt. Not having hallucinations.   Anxiety is still present, no panic attacks. Not suicidal or homicidal     Last drink: Wednesday May 16  Average consumption: 1/5th of whiskey/day   Withdrawal: yes, mild   Spouse/partner consumption: yes,  drinks   Rehab/Detox: yes   AA/NA: while in ABU only, "     Collateral: spoke with pt's  Rai Garner ([phone])- Pt mood has been stable up until about 2 months ago, he started noting increased pattern of high [employer] and grocery bills without corresponding products (initially unclear as he was unable to do usual bill keeping for a while). Believes that pt was drinking during the day and then attempting to stop in enough time to not appear or smell intoxicated when he got home in the evenings. Friends have also noticed that she was drinking but did not want to speak about it until they were 100% sure. Per  pt can be manipulative about her substance abuse.  also worried that the frequency of mood episodes is increasing, believes that episode leading to hospitalization two years ago was also alcohol related. Pt has thrown out whiskey in front of him and promised that she hadn't stashed other alcohol.  agreed to toss out beer in home and willing to check liquids in home for being replaced with alcohol. Discussed eventual use of naltrexone in treatment.     Medical Review Of Systems:  Back pain     Psychiatric Review Of Systems:  sleep: no  appetite: no  weight: no  energy/anergy: yes  interest/pleasure/anhedonia: yes  anxiety/panic: yes  guilty/hopelessness: yes  concentration: yes  S.I.B.s/risky behavior: no  Irritability: no  Impulsive behaviors: no  AVH: no    Allergies:  Patient has no known allergies.    Substance Abuse History:  Substance of Choice: alcohol and tramodol   Substances Used: no history of illicit drug use  History of IVDU?: No  Use of Alcohol: Yes -  Tobacco: No  History of Withdrawals: Yes -   History of Detox: No  Rehab History: Yes - multiple ABU, BMU visits   Patient aware of biomedical complications? Yes      Substance Use Disorder Criteria:  1. Often take in larger amounts or over a longer period of time than was intended: yes, for alcohol and tramadol   2. Persistent desire or unsuccessful efforts to cut down or  control use: no  3. Great deal of time spent in activities necessary to obtain substance, use, or recover from effects: no   4. Craving/strong desire for substance or urge to use: yes, for alcohol   5. Use resulting in failure to fulfill major role obligations at home, work or school: yes, for alcohol   6. Social, occupational, recreational activities decreased because of use: yes, for alcohol   7. Continued use despite having persistent or recurrent social or interpersonal problems cause or exaserbated by the substance: yes for alcohol and tramadol  8. Recurrent use in situations in which it is physically hazardous: yes, for alcohol and tramadol   9. Use despite physical or psychological problems that are likely to have been caused or exacerbated by the substance: Yes - for tramadol  10. Tolerance: Yes - for alcohol and tramadol   11. Withdrawal: Yes - for alcohol and tramadol   Mild (1-3), Moderate (4-5), Severe (?6)    Medical/Surgical History:  Past Medical History:   Diagnosis Date    Abnormal Pap smear     CRY[facility]     Anxiety     Depression     History of psychiatric hospitalization     Hx of psychiatric care     Hypocalcemia 9/25/2016    [facility] by [facility]     Psychiatric problem     Therapy      Past Surgical History:   Procedure Laterality Date    breast reduction  2000    CRYOTHERAPY      gastic bypass   2007       Psychiatric History:  Previous Medication Trials: yes, lithium, risperdal, zyprexa, lamictal, lexapro   Previous Psychiatric Hospitalizations: yes   Previous Suicide Attempts: yes   History of Violence: no  Outpatient Psychiatrist: yes    Social History:  Marital Status:   Children: 2   Employment Status: retired, occupational therapist, 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        Mental Status Exam:  Appearance: age appropriate, well nourished, casually dressed  Behavior/Cooperation: cooperative, eye contact normal  Speech: normal tone, normal volume, spontaneous  Mood: "okay"  Affect: constricted  Thought Process: goal-directed, logical  Thought Content: not suicidal or homicidal  Orientation: person, place, situation, time/date, day of week, month of year  Memory: Intact  Attention Span/Concentration: Normal  Insight: poor  Judgment: poor    Labs/Imaging/Studies:   No results found for this or any previous visit (from the past 24 hour(s)).     ASSESSMENT:     Alcohol Use Disorder, severe  Opiate Use Disorder, moderate to severe   Bipolar I Disorder, MRE depressed     PLAN:     · Start patient on ABU protocol.  · Breathalyzer daily  and urine toxicology three times a week  · VS daily x 3 days.  · CBC, CMP, Vit B12 and folate  · Patient counseled on abstinence from alcohol  · Will plan to taper use of tramadol and klonopin during course of ABU program  · Will continue with current medication regimen of zyprexa 10mg qhs, Klonopin 1mg qhs, and Lexapro 15mg daily  · Naltrexone 50mg daily ordered- however due to recent use of tramadol, pt advised to not start       Case to be discussed with Dr. Smith, Staff psychiatrist    Charo Boucher MD  [facility] PGY5    "

## 2018-05-21 NOTE — PLAN OF CARE
05/21/18 1000   Activity/Group Therapy Checklist   Group Educational  (codependency )   Attendance Attended   Follows Direction Followed directions   Group Interactions/Observations Interacted appropriately   Affect/Mood Range Blunted/flat

## 2018-05-22 ENCOUNTER — HOSPITAL ENCOUNTER (OUTPATIENT)
Dept: PSYCHIATRY | Facility: HOSPITAL | Age: 68
Discharge: HOME | End: 2018-05-22
Attending: PSYCHIATRY & NEUROLOGY
Payer: COMMERCIAL

## 2018-05-22 DIAGNOSIS — F10.20 ALCOHOL USE DISORDER, SEVERE, DEPENDENCE: Primary | ICD-10-CM

## 2018-05-22 DIAGNOSIS — F10.90 ALCOHOL USE DISORDER: Primary | ICD-10-CM

## 2018-05-22 LAB — BREATH ALCOHOL: 0

## 2018-05-22 PROCEDURE — 90853 GROUP PSYCHOTHERAPY: CPT

## 2018-05-22 PROCEDURE — 90853 GROUP PSYCHOTHERAPY: CPT | Mod: ,,, | Performed by: PSYCHOLOGIST

## 2018-05-22 PROCEDURE — 90853 GROUP PSYCHOTHERAPY: CPT | Performed by: SOCIAL WORKER

## 2018-05-22 PROCEDURE — 90853 GROUP PSYCHOTHERAPY: CPT | Mod: 59,,, | Performed by: PSYCHOLOGIST

## 2018-05-22 PROCEDURE — 99232 SBSQ HOSP IP/OBS MODERATE 35: CPT | Mod: ,,, | Performed by: PSYCHIATRY & NEUROLOGY

## 2018-05-22 RX ORDER — GABAPENTIN 300 MG/1
300 CAPSULE ORAL 3 TIMES DAILY
Qty: 90 CAPSULE | Refills: 0 | Status: SHIPPED | OUTPATIENT
Start: 2018-05-22 | End: 2018-06-21

## 2018-05-22 NOTE — PROGRESS NOTES
Group Psychotherapy (PhD/LCSW)    Site: [facility]    Clinical status of patient: Intensive Outpatient Program (IOP)    Date: 5/22/2018    Group Focus: ACT Group Therapy    Length of service: 67052 - 45-50 minutes    Number of patients in attendance: 12    Referred by: Addictive Behavior Unit Treatment Team    Target symptoms: Alcohol Abuse    Patient's response to treatment: Active Listening; Self-disclosure     Progress toward goals: Progressing adequately    Interval History: Session focus was Fusion and Defusion.  Patients were introduced to defusion, relational frame theory, and defusion exercises.    Diagnosis: Alcohol Use d/o, severe dependence     Plan: Continue treatment on ABU

## 2018-05-22 NOTE — PROGRESS NOTES
Group Psychotherapy (PhD/LCSW)    Site: [facility]    Clinical status of patient: Intensive Outpatient Program (IOP)    Date: 5/22/2018    Group Focus: Disease Model of Addiction      Length of service: 67332 - 45-50 minutes    Number of patients in attendance: 7    Referred by: Addictive Behavior Unit Treatment Team    Target symptoms: Alcohol Abuse    Patient's response to treatment: Active Listening; Self-disclosure     Progress toward goals: Progressing adequately    Interval History: Discussed the basic neuropsychological concepts of the Disease Model of Addiction and how they relate to the phenomena of addiction (eg: powerlessness; cravings; euphoric recall; relapse; etc). Noted the value of understanding the Disease Model for sustaining long-term sobriety.        Diagnosis: Alcohol Use d/o, severe dependence     Plan: Continue treatment on ABU

## 2018-05-22 NOTE — PLAN OF CARE
05/22/18 1400   Activity/Group Therapy Checklist   Group Goals/Reflection  (life story )   Attendance Attended   Follows Direction Followed directions   Group Interactions/Observations Guarded

## 2018-05-22 NOTE — PROGRESS NOTES
"2018 11:28 AM  Name: Martha Garner  : 1950  Start Date: 18    ABU Intensive Outpatient Program   Progress Note    Status: Intensive Outpatient Program (IOP)    HPI:  Pt admitted on 18 with alcohol use disorder, opiate use disorder, and bipolar I disorder. Pt had relapse with alcohol use about 2 months ago, reports that she has been abusing her prescribed tramadol since it was started. Pt also struggling with mood symptoms, has been both depressed by her reports and irritable by family reports. Please see H&P dated 18 for full details.     SUBJECTIVE:     Interval Hx:  Pt seen this morning, affect dysphoric and constricted. Pt reports that her mood is still "depressed" and energy is low despite sleeping well last night. Pt did attend an AA meeting as required by program but found it to be "janett". Pt discussed struggling to engage in program at this time; reports that she "doesn't really want to be here" and that "I'd rather be drinking". Pt able to engage in cognitive discussion of multiple consequences if she is unable to maintain sobriety- including negative effects on her marital relationship and worsening physical health.   Pt reports that she is still having cravings, feels that the cravings are making being in program difficult. Discussed that opiate based pain management, such as tramadol will no longer be effective once she starts naltrexone. Pt in agreement with starting medication tomorrow.   Pt not suicidal at this time, admits to having had SI in the past. Pt not homicidal.     Medication Side Effects: None  Cravings: yes  No other acute psychiatric issues reported at this time.    Scheduled Meds:   Current Outpatient Prescriptions on File Prior to Encounter   Medication Sig Dispense Refill    calcium citrate (CALCITRATE) 200 mg (950 mg) tablet Take 3 tablets (600 mg total) by mouth 3 (three) times daily with meals. 90 tablet 0    clonazePAM (KLONOPIN) 1 MG tablet Take 0.5-1 " tablets (0.5-1 mg total) by mouth nightly as needed for Anxiety. 30 tablet 0    escitalopram oxalate (LEXAPRO) 10 MG tablet TAKE 1&1/2 TABLETS BY MOUTH ONCE DAILY 45 tablet 3    naltrexone (DEPADE) 50 mg tablet Take 1 tablet (50 mg total) by mouth once daily. 30 tablet 0    OLANZapine (ZYPREXA) 10 MG tablet TAKE ONE TABLET BY MOUTH EVERY EVENING 30 tablet 3    olanzapine (ZYPREXA) 2.5 MG tablet Take 1-2 tablets (2.5-5 mg total) by mouth every evening. 60 tablet 3     No current facility-administered medications on file prior to encounter.      Allergies:  Patient has no known allergies.    Psychiatric Review Of Systems - Is patient experiencing or having changes in:  sleep: no  appetite: no  weight: no  energy/anergy: yes  interest/pleasure/anhedonia: yes  anxiety/panic: yes  guilty/hopelessness: yes  concentration: yes  S.I.B.s/risky behavior: no  AVH: no        OBJECTIVE:     Vital Signs (Most Recent):  There were no vitals filed for this visit.    Mental Status Exam:  Appearance: age appropriate, casually dressed, neatly groomed  Behavior/Cooperation: cooperative, eye contact normal  Speech: normal tone, normal volume, non-spontaneous  Mood: depressed  Affect: constricted and dysphoric  Thought Process: goal-directed, logical  Thought Content: normal, no suicidality, no homicidality, delusions, or paranoia  Orientation: person, place, situation  Memory: Grossly intact  Attention Span/Concentration: Grossly intact  Cognition: grossly intact  Insight: fair  Judgment: good    Laboratory:  Recent Results (from the past 168 hour(s))   Toxicology screen, urine    Collection Time: 05/21/18 11:10 AM   Result Value Ref Range    Alcohol, Urine <10 <10 mg/dL    Benzodiazepines Negative     Methadone metabolites Negative     Cocaine (Metab.) Negative     Opiate Scrn, Ur Negative     Barbiturate Screen, Ur Negative     Amphetamine Screen, Ur Negative     THC Negative     Phencyclidine Negative     Creatinine, Random Ur  149.0 15.0 - 325.0 mg/dL    Toxicology Information SEE COMMENT    POCT BREATH ALCOHOL TEST    Collection Time: 05/21/18 11:11 AM   Result Value Ref Range    Breath Alcohol 0.00    COMPREHENSIVE METABOLIC PANEL    Collection Time: 05/21/18  3:21 PM   Result Value Ref Range    Sodium 142 136 - 145 mmol/L    Potassium 4.4 3.5 - 5.1 mmol/L    Chloride 107 95 - 110 mmol/L    CO2 25 23 - 29 mmol/L    Glucose 105 70 - 110 mg/dL    BUN, Bld 16 8 - 23 mg/dL    Creatinine 0.8 0.5 - 1.4 mg/dL    Calcium 9.4 8.7 - 10.5 mg/dL    Total Protein 7.0 6.0 - 8.4 g/dL    Albumin 3.8 3.5 - 5.2 g/dL    Total Bilirubin 0.4 0.1 - 1.0 mg/dL    Alkaline Phosphatase 70 55 - 135 U/L    AST 25 10 - 40 U/L    ALT 26 10 - 44 U/L    Anion Gap 10 8 - 16 mmol/L    eGFR if African American >60.0 >60 mL/min/1.73 m^2    eGFR if non African American >60.0 >60 mL/min/1.73 m^2   CBC W/ AUTO DIFFERENTIAL    Collection Time: 05/21/18  3:21 PM   Result Value Ref Range    WBC 5.75 3.90 - 12.70 K/uL    RBC 4.74 4.00 - 5.40 M/uL    Hemoglobin 14.5 12.0 - 16.0 g/dL    Hematocrit 47.4 37.0 - 48.5 %     (H) 82 - 98 fL    MCH 30.6 27.0 - 31.0 pg    MCHC 30.6 (L) 32.0 - 36.0 g/dL    RDW 12.9 11.5 - 14.5 %    Platelets 268 150 - 350 K/uL    MPV 10.2 9.2 - 12.9 fL    Immature Granulocytes 0.3 0.0 - 0.5 %    Gran # (ANC) 3.4 1.8 - 7.7 K/uL    Immature Grans (Abs) 0.02 0.00 - 0.04 K/uL    Lymph # 1.4 1.0 - 4.8 K/uL    Mono # 0.6 0.3 - 1.0 K/uL    Eos # 0.3 0.0 - 0.5 K/uL    Baso # 0.05 0.00 - 0.20 K/uL    nRBC 0 0 /100 WBC    Gran% 58.4 38.0 - 73.0 %    Lymph% 24.2 18.0 - 48.0 %    Mono% 10.6 4.0 - 15.0 %    Eosinophil% 5.6 0.0 - 8.0 %    Basophil% 0.9 0.0 - 1.9 %    Differential Method Automated    FOLATE    Collection Time: 05/21/18  3:21 PM   Result Value Ref Range    Folate 8.2 4.0 - 24.0 ng/mL   VITAMIN B12    Collection Time: 05/21/18  3:21 PM   Result Value Ref Range    Vitamin B-12 750 210 - 950 pg/mL   POCT BREATH ALCOHOL TEST    Collection Time:  05/22/18  2:34 PM   Result Value Ref Range    Breath Alcohol 0.00      ASSESSMENT:     Alcohol Use Disorder, severe  Opiate Use Disorder, moderate to severe   Bipolar I Disorder, MRE depressed     PLAN:     · Continue patient on ABU protocol.  · Breathalyzer daily and urine toxicology three times a week.   · VS daily x3 days.  · Patient counseled on abstinence from alcohol, tramadol     Medications:   · Will continue with zyprexa 10mg qhs, Klonopin 1mg qhs, and Lexapro 15mg daily for management of bipolar disorder  · Begin Gabapentin 300mg tid as off label treatment for alcohol cravings  · Will give test dose of naltrexone 25mg tomorrow during program activities     Status: Continue treatment on ABU    Patient's Intervention Response: guarded    Case discussed with Holley Smith MD.      Charo Boucher MD   [facility]  Psych PGY5

## 2018-05-22 NOTE — PROGRESS NOTES
"Group Psychotherapy (PhD/LCSW)    Site: [facility]    Clinical status of patient: Intensive Outpatient Program (IOP)    Date: 5/22/2018    Group Focus: Psychodynamic Group Therapy      Length of service: 86727 - 45-50 minutes    Number of patients in attendance: 7    Referred by: Addictive Behavior Unit Treatment Team    Target symptoms: Alcohol Abuse    Patient's response to treatment: Active Listening; Self-disclosure     Progress toward goals: Progressing adequately    Interval History: Pt shared her story with a new group member. She discussed the way AA has been helpful to her as a group experience because "we can't do it alone."       Diagnosis: Alcohol Use d/o, severe dependence     Plan: Continue treatment on ABU        "

## 2018-05-23 ENCOUNTER — HOSPITAL ENCOUNTER (OUTPATIENT)
Dept: PSYCHIATRY | Facility: HOSPITAL | Age: 68
Discharge: HOME | End: 2018-05-23
Attending: PSYCHIATRY & NEUROLOGY
Payer: COMMERCIAL

## 2018-05-23 DIAGNOSIS — F10.20 ALCOHOL USE DISORDER, SEVERE, DEPENDENCE: Primary | ICD-10-CM

## 2018-05-23 LAB
AMPHET+METHAMPHET UR QL: NEGATIVE
BARBITURATES UR QL SCN>200 NG/ML: NEGATIVE
BENZODIAZ UR QL SCN>200 NG/ML: NEGATIVE
BREATH ALCOHOL: 0
BZE UR QL SCN: NEGATIVE
CANNABINOIDS UR QL SCN: NEGATIVE
CREAT UR-MCNC: 100 MG/DL
ETHANOL UR-MCNC: <10 MG/DL
METHADONE UR QL SCN>300 NG/ML: NEGATIVE
OPIATES UR QL SCN: NEGATIVE
PCP UR QL SCN>25 NG/ML: NEGATIVE
TOXICOLOGY INFORMATION: NORMAL

## 2018-05-23 PROCEDURE — 90853 GROUP PSYCHOTHERAPY: CPT

## 2018-05-23 PROCEDURE — 90853 GROUP PSYCHOTHERAPY: CPT | Mod: ,,, | Performed by: PSYCHOLOGIST

## 2018-05-23 PROCEDURE — 90853 GROUP PSYCHOTHERAPY: CPT | Mod: ,,, | Performed by: PSYCHIATRY & NEUROLOGY

## 2018-05-23 PROCEDURE — 80307 DRUG TEST PRSMV CHEM ANLYZR: CPT

## 2018-05-23 PROCEDURE — 99232 SBSQ HOSP IP/OBS MODERATE 35: CPT | Mod: ,,, | Performed by: PSYCHIATRY & NEUROLOGY

## 2018-05-23 PROCEDURE — 90853 GROUP PSYCHOTHERAPY: CPT | Performed by: SOCIAL WORKER

## 2018-05-23 NOTE — TREATMENT PLAN
[facility]  ADDICTIVE BEHAVIOR UNIT  INTERDISCIPLINARY TREATMENT PLAN  INTENSIVE OUTPATIENT PROGRAM    INTERDISCIPLINARY  TREATMENT TEAM:    Choco Patterson M.D., Psychiatrist     Holley Smith M.D., Psychiatrist     Monika Baird, Ph.D., Clinical Psychologist    Deneen Martínez R.N., Registered Nurse    Jean-Paul Porter, [facility],     Lewis Epps, [facility],     Brent Alfaro, [facility],     Resident: Dr. Boucher       Signatures scanned into record separately.      ESTIMATED LOS:  4-6 weeks        The patient has reviewed the treatment plan with staff and has signed the Patient Responsibilities form.  Patient signature scanned into record separately        Dr. Choco Patterson certifies that the patient would require inpatient psychiatric care if the Partial Hospitalization services were not provided, and services will be furnished under the care of a physician, and under a written Plan of Treatment.    Choco Patterson M.D., Psychiatrist - Signature scanned into record separately.    TREATMENT PLAN    DIAGNOSIS: Alcohol Use Disorder, severe; Opioid Use Disorder, moderate to severe; Bipolar 1 Disorder              Patient/Family Education Needs/Barriers to Learning (i.e., Language, Reading, Comprehension): None       Support/Advocacy Services/Needs (i.e., Financial, Transportation, Medications): None       Community Resources (i.e., Alcoholics Anonymous, Al Anon, Cocaine Anonymous, Narcotics Anonymous): None           Strengths:  1. Helpful to others   2. Good listener   3. Alena   4. Willing for treatment           Limitations:  1. Cravings   2. Stress   3. Obsessive/compulsive        4. Risk for relapse           Goals and Objectives:  1. Goal:  Abstain from alcohol and illicit drugs   Objective measure: Negative breathalyzer, negative urine screens   Time frame to reach goal: By discharge    2  Goal: Attend daily 12-step meetings   Objective measure: Signed attendance  sheet daily   Time frame to reach goal: Each day    3. Goal: Participate in group sessions    Objective measure: Progress notes indicating active listening, self-disclosure,   feedback   Time frame to reach goal: Each day    4. Goal: Obtain a 12-step sponsor   Objective measure: self-report   Time frame to reach goal: By discharge    5. Goal: Complete Life Story   Objective measure: Share story with group   Time frame to reach goal: Within first two weeks of treatment    6. Goal: Complete First Step   Objective measure: Share 1st step with group   Time frame to reach goal:  By discharge    7. Goal: Complete Relapse Prevention Plan   Objective measure: Share plan with group   Time frame to reach goal: By discharge    8.  Goal: Family involvement/participation   Objective measure: Family session documented in progress notes   Time frame to reach goal: By discharge    9. Goal:  Reduce depression   Objective measure: Physician progress note indicating depression is improved   Time frame to reach goal: By discharge    10.  Goal: Reduce anxiety   Objective measure: Physician progress note indicating anxiety is improved   Time frame to reach goal: By discharge    11. Goal: Pain management   Objective measure: Physician note addressing how to manage  pain   Time frame to reach goal: By discharge    12.  Goal: Address Health Problems   Objective measure: Physicians note regarding management of health problems   Time frame to reach goal: Within first week of treatment                  Group Interventions:  Psychodynamic Group Psychotherapy  1 hour, five times per week  Goals: 1. Utilize group empathy and support for problem solving; 2. Apply stress management, communication, and assertive skills to personal issues; 3. Discuss negative consequences of addictive behavior; 4. Discuss ways to change lifestyle to support sobriety; 5. Discuss addiction history    Addiction Education Group  1 hour, 2 times per week  Goals:  1.  Verbalize increased knowledge of the process of recovery; 2. Understand basic concepts of addiction (denial, powerlessness, unmanageabiltiy, etc.); 3. Develop a consistent, positive image of self    Steps to Recovery Group  1 hour, 1 time per week  Goals:  1. Learn 12 steps; 2. Identify ways to incorporate 12 step principles into daily life; 3. Complete first step; 4. Verbalize knowledge and understanding of the concept of a higher power    Living Sober Group  1 hour, 2 times per week  Goals:  1.  Reflect upon events of day/weekend, focusing on positive change; 2.  Discuss dynamics of 12 step meetings attended; 3. Discuss topics from book Living Sober     Stress Management Skills Group  1 hour, 3 times per week  Goals: 1. Identify types and levels of stress; 2. Identify and change maladaptive beliefs and behaviors; 3. Identify and practice techniques of stress management    Disease Concept Group  1 hour, 1 time per week  Goals: 1. Verbalize an understanding of the disease concept of addiction; 2. Increase familys understanding of the disease concept of addiction    Communication Skills Group  1 hour, 2 times per week  Goals: 1. Learn rules of effective communication; 2. Improve listening skills; 3. Practice clear communication    Promoting Healthy Lifestyles Group  1 hour, 1 time per week  Goals:  1. Understand the biopsychosocial model of health; 2. Develop insight into how substance abuse/dependency can impact dimensions of health; 3. Develop appropriate health promotion strategies    Relationship Dynamics Group  1 hour, 1 time per week  Goals:  1. Learn about factors that shape relationships; 2. Understand the central role of relationships in personal well-being; 3. Learn how to improve all relationships    Medical Complications Group  1 hour, 1 time per week  Goals:  1.  Increase knowledge of how addiction negatively affects the body; 2. Increase awareness of how abstinence can positively impact health

## 2018-05-23 NOTE — PROGRESS NOTES
Group Psychotherapy (PhD/LCSW)    Site: [facility]    Clinical status of patient: Intensive Outpatient Program (IOP)    Date: 5/23/2018    Group Focus: Psychodynamic Group Therapy      Length of service: 19999 - 45-50 minutes    Number of patients in attendance: 7    Referred by: Addictive Behavior Unit Treatment Team    Target symptoms: Alcohol Abuse    Patient's response to treatment: Active Listening; Self-disclosure     Progress toward goals: Progressing adequately    Interval History: Pt shared her story with a new group member. Pt discussed the [place] in her mind between feeling that she can drink and knowing she can't. She acknowledged grief over the thought of having to give up etoh.     Diagnosis: Alcohol Use d/o, severe dependence     Plan: Continue treatment on ABU

## 2018-05-23 NOTE — PSYCH
"PRESENTING PROBLEM:    Date:  2018                  Time: 9:57 AM  Name: Martha Garner  Age: 68 y.o.             : 1950           Race:     Precipitating Event: Pt presents to the ABU for increased alcohol intake. Pt reports that she started drinking about 2 months ago. Pt reports that prior to entering tx she would drink 2 bottles of wine or a 5th of whiskey.  Martha Garner is a 68 y.o. female presents for admission to ABU today with principal problem of alcohol and opiate use disorder. Psychiatric history notable for bipolar I disorder. Pt followed by Dr. Smith for outpatient psychiatric care. As noted by Dr. Smith:   " 69 yo  retired woman with past psych hx significant for bipolar I disorder until recently in remission, hx of opioid and alcohol abuse which had been in remission for several years, now presenting to ABU with relapse on alcohol and opioid medications 2-3months ago. Pt reports taking extra of her prescribed tramadol for the past few months. Also reports relapsing on alcohol, progressing to daily use over past few months. Drinking up to a 1/5th of whiskey daily. Will sip on it all day long. Patient cites stress over rapid planning for last minute wedding for her daughter as a possible precipitant. Family noted that she was more irritable and at times confused.  also reports patient leaving burners on while intoxicated. Patient revealed use to psychiatrist Dr Smith at follow up visit last week on . Of note BAL at that visit was 0.128 and patient had driven to the visit. Patient minimized use and lacked insight into severity. However for safety and treatment planning family was called and patient agreed to enter ABU today after wedding on  had passed. Patient was provided with klonopin detox on , started on 0.5 mg tid (of note had been taking 0.5 mg qhs for insomnia until now), and provided plan for further tapering.         Patient with hx " "of bipolar disorder, with typically depressed mood in the gordon and hypomania in the summers. In 2016 developed severe mixed state with psychosis, consisting of depressed mood, excessive guilt, hyperreligious obsessions and guilty. Symptoms ultimately stabilized on combination of lexapro and zyprexa. Risperdal, lithium and lamictal trials failed. (Lithium helpful for stone but not depression). of note prior stone has been associated with substance use.     Patient needs ABU treatment for her Alcohol use disorder, severe with last drink on 5/16, previously drinking 1/5th per day, use affecting marriage and threatening daughters wedding, patient unable to control use, uses in dangerous circumstances (while driving, cooking), has interfered with responsibilities (leaving burners on) and affecting mood (hx of bipolar disorder, now with more mood lability per familY). Patient has also relapsed on opioids and needs treatment for this as well. She will need close monitoring due to concurrent bipolar disorder and possible connection of substances to her mood state. "     Pt seen this morning as part of ABU intake process. Pt reports that she is "okay, other than wanting to drink". Pt  first drink was in high school but drinking became a problem about 10 yrs ago. Pt reports that this relapse was related to the it was the level of stress, once she starts she is "off to the races". Pt started drinking again 2 to 2.5 months ago. Pt reports that she drinks up a 1/5th whiskey a day. Also drinking wine and tequila, interchangable to whiskey. Pt reports having cravings, noting that her ability to maintain her usual family duties was impaired, pt noting having hopeless about her ability to stop drinking. Pt admits to drinking and driving, discussed tolerance of not feeling intoxicated last week while BAL was 0.128. Pt reports having some withdrawal symptoms, including mild nausea and tremor.  Last drink was last Wednesday. Pt was " "advised to start a klonopin detox at last visit, however found recommended dose of klonopin 0.5mg tid  was too much, and instead  took 1/4 of  a 1mg pill 2 times a day, last time was 2 days ago. Pt reports that she is back to taking klonopin 0.5mg  at night.  Pt reports that naltrexone was very helpful in reducing cravings in the past, is interested in restarting it.      Pt reports "I'm addicted to tramadol too".  Pt started taking tramadol about 3 yrs ago for back pain,  Reports that she started misusing it within 2 to 3 weeks.  Pt  attempts to help monitor her use by keeping it away from her and distributing prescribed doses, however pt admits that she will pour some out of bottle when she picks it up so that she can take more and will hold on to doses so that she can take them all at once. Pt will take will take up to seven 50mg tramadol  at one time. Last took tramadol yesterday. Pt reports that it is a problem in her marriage and her  daughter also concerned,  admits to forgetfulness associated  with tramadol use. Pt feels that use has gotten out of control. Pt reports " I like the way it feels", euphoric and "sorta a buzz". Little diarrhea associated with decreased doses.      Pt reports that she was depressed during her visit in the fall, denied feeling hypomanic during the spring. Pt report that she was "full of anxiety, angst" which pushed her to start drinking again. Pt denied increased impulsivity, no irritably. Pt had decreased usual activity, reports that energy levels are actually low, endorsed anhedonia, low motivation (stopped going to the grocery store), poor concentration/difficulty with organization. Pt reports hopelessness but it was mostly about her alcohol use. Sleep is consistent with use of klonopin. Pt denied guilt. Not having hallucinations.   Anxiety is still present, no panic attacks. Not suicidal or homicidal      Last drink: Wednesday May 16  Average consumption: 1/5th of " whiskey/day   Withdrawal: yes, mild   Spouse/partner consumption: yes,  drinks   Rehab/Detox: yes   AA/NA: while in ABU only,      Collateral: spoke with pt's  Rai Garner ([phone])- Pt mood has been stable up until about 2 months ago, he started noting increased pattern of high [employer] and grocery bills without corresponding products (initially unclear as he was unable to do usual bill keeping for a while). Believes that pt was drinking during the day and then attempting to stop in enough time to not appear or smell intoxicated when he got home in the evenings. Friends have also noticed that she was drinking but did not want to speak about it until they were 100% sure. Per  pt can be manipulative about her substance abuse.  also worried that the frequency of mood episodes is increasing, believes that episode leading to hospitalization two years ago was also alcohol related. Pt has thrown out whiskey in front of him and promised that she hadn't stashed other alcohol.  agreed to toss out beer in home and willing to check liquids in home for being replaced with alcohol. Discussed eventual use of naltrexone in treatment.      Consequences of Use (Explain):Family  Biomedical complication of use: no   Motivation for Change (Explain): pt reports that she has no other choice but to be motivated but does not necessarily want to stop drinking.   Needed Skills to Achieve Goals: stress management, coping skills, AA, Addiction education    CHILDHOOD and FAMILY HISTORY    Issue or Concerns Related to Childhood: yes, pt did not want to explain   Childhood/Adolescent Behavior Problems: anorexic   Family History:   - Father (name and age): Rai     - Paternal History of Substance Abuse/Mental Health: yes, brothers, sisters, aunts, uncles-alcohol    - Mother (name and age): Jovita     - Maternal History of Substance Abuse/Mental Health: yes- alcohol    - Siblings (name[s] and  "age[s]): Maverick 70 y.o., Pat 69 y.o., Olga 64 y.o.    -Siblings Substance Abuse/Mental Health: Maverick- sober now, Pat- sober 25 years, Olga-active drug user   Relationship with family members: strained, going through an inheritance issue  Marital Status:   Relationship History:  for 41 years, "good loving at the moment, supportive"  Children: Rudolph 40 y.o., Alberta 36 y.o.   -Substance Abuse/Mental Health Concerns: Alberta- recovering alcoholic, bipolar Rudolph- alcoholic   -Relationship with children: "strained with son but good with daughter"  Living Situation: Lives at home with   Support System:  and AA members  Psychosocial Stressors related to interpersonal relationships: son- pt reports that he still drinks and does not have a steady job    EDUCATION and OCCUPATIONAL    Education Level: College  Occupation Status: retired   Previous/Current Occupation: Occupational therapist  Financial Status: stable   Psychosocial Stressors related to work or finances: no     MENTAL HEALTH AND SUBSTANCE ABUSE    Psychiatric History/Previous Substance Abuse: Rehab, Therapy Outpatient Currently in tx with Dr. Smith, Therapy Inpatient [facility] currently sees Cony Hamilton for therapy, Past Psych Hospitalizations 3 in the [facility], 1 at age 20 at [facility], Past Suicide Attempts 1 and Past Suicide Types overdose  Substance Abuse History: Alcohol Age 18 y.o., started off every weekend, pt reports during college she binged on the weekends pt reports that she has been sober for 7 years up until 2 monthws ago. Pt reports that she was drinking 2 bottles of wine or a 5th of whiskey per day and once relapsed within the alst 2 months pt reports drinking the same amount. , NicotineAge 20 smoked cigarettes about half a pack of day until age 32 and Opioids Age 20 y.o., off and on for years.When pt was manic she would abuse opiates. Pt reports taking about 15 milligrams at the most.   Other Addictive " Behaviors: no  History of Detoxification Treatment/ Residential Treatment Facility: at age 20 [facility] for 3 months   History of Inpatient Psychiatric Care: [facility] and [place]  HIV/AIDS/Sexually Transmitted Disease Risk (unsafe sex/needle sharing): no   Suicidal/Homicidal Ideation and/or Plan (Explain): no   Current Risk: moderate   Psychosocial Stressors related to mental health or substance abuse: pt reports she is stressed being in tx. Pt also suffers with bipolar disorder.     OTHER RELATED HISTORY    Current Health Concerns: no   Physical/Emotional/Sexual Abuse: no  Trauma History: PTSD from relationship with father    History: No  Church/Spiritual Orientation: Spiritism  Spiritual impact on treatment: no  Current/Past Legal History: no   -Probation/: N/A  Access To Guns: No   -Secured: N/A  Psychosocial Stressors related to other health history: no     Past Medical History:   Diagnosis Date    Abnormal Pap smear     CRYO     Anxiety     Depression     History of psychiatric hospitalization     Hx of psychiatric care     Hypocalcemia 9/25/2016    Psychiatric exam requested by authority     Psychiatric problem     Therapy        Past Surgical History:   Procedure Laterality Date    breast reduction  2000    CRYOTHERAPY      gastic bypass   2007       Family History   Problem Relation Age of Onset    Alcohol abuse Father     Hypertension Mother     Depression Mother     Hypertension Brother     Obesity Brother     Kidney disease Brother     Alcohol abuse Brother     Hypertension Sister     Obesity Sister     Kidney disease Sister     Alcohol abuse Sister     Alcohol abuse Sister     Alcohol abuse Cousin     Breast cancer Neg Hx     Colon cancer Neg Hx     Ovarian cancer Neg Hx        Social History     Social History    Marital status:      Spouse name: N/A    Number of children: N/A    Years of education: N/A     Social History Main Topics     "Smoking status: Never Smoker    Smokeless tobacco: Never Used    Alcohol use No    Drug use: No    Sexual activity: Yes     Partners: Male     Birth control/ protection: None      Comment:  to Rai,  age 45     Other Topics Concern    Not on file     Social History Narrative    No narrative on file       Current Outpatient Prescriptions   Medication Sig Dispense Refill    calcium citrate (CALCITRATE) 200 mg (950 mg) tablet Take 3 tablets (600 mg total) by mouth 3 (three) times daily with meals. 90 tablet 0    clonazePAM (KLONOPIN) 1 MG tablet Take 0.5-1 tablets (0.5-1 mg total) by mouth nightly as needed for Anxiety. 30 tablet 0    escitalopram oxalate (LEXAPRO) 10 MG tablet TAKE 1&1/2 TABLETS BY MOUTH ONCE DAILY 45 tablet 3    gabapentin (NEURONTIN) 300 MG capsule Take 1 capsule (300 mg total) by mouth 3 (three) times daily. 90 capsule 0    naltrexone (DEPADE) 50 mg tablet Take 1 tablet (50 mg total) by mouth once daily. 30 tablet 0    OLANZapine (ZYPREXA) 10 MG tablet TAKE ONE TABLET BY MOUTH EVERY EVENING 30 tablet 3    olanzapine (ZYPREXA) 2.5 MG tablet Take 1-2 tablets (2.5-5 mg total) by mouth every evening. 60 tablet 3     No current facility-administered medications for this encounter.        Review of patient's allergies indicates:  No Known Allergies    DIAGNOSIS AND IMPRESSIONS    Diagnosis: Alcohol Use Disorder, severe  Opiate Use Disorder, moderate to severe   Bipolar I Disorder, MRE depressed   Baseline: "quiet, unassuming, isolating since started drinking, pt reports that prior to relapse she was more social, normal eating and sleeping patters."   Impressions: Pt was calm and cooperative during assessment. Pt appeared very guarded with information. Pt reports that she does not want to stop drinking but has no choice due to pt's .   Pt presents to the ABU for increased alcohol intake. Pt reports that she started drinking about 2 months ago. Pt reports that prior to " "entering tx she would drink 2 bottles of wine or a 5th of whiskey.  Martha Garner is a 68 y.o. female presents for admission to ABU today with principal problem of alcohol and opiate use disorder. Psychiatric history notable for bipolar I disorder. Pt followed by Dr. Smith for outpatient psychiatric care. As noted by Dr. Smith:   " 67 yo  retired woman with past psych hx significant for bipolar I disorder until recently in remission, hx of opioid and alcohol abuse which had been in remission for several years, now presenting to ABU with relapse on alcohol and opioid medications 2-3months ago. Pt reports taking extra of her prescribed tramadol for the past few months. Also reports relapsing on alcohol, progressing to daily use over past few months. Drinking up to a 1/5th of whiskey daily. Will sip on it all day long. Patient cites stress over rapid planning for last minute wedding for her daughter as a possible precipitant. Family noted that she was more irritable and at times confused.  also reports patient leaving burners on while intoxicated. Patient revealed use to psychiatrist Dr Smith at follow up visit last week on 5/16. Of note BAL at that visit was 0.128 and patient had driven to the visit. Patient minimized use and lacked insight into severity. However for safety and treatment planning family was called and patient agreed to enter ABU today after wedding on 5/19 had passed. Patient was provided with klonopin detox on 5/16, started on 0.5 mg tid (of note had been taking 0.5 mg qhs for insomnia until now), and provided plan for further tapering.         Patient with hx of bipolar disorder, with typically depressed mood in the [place]ordon and hypomania in the summers. In 2016 developed severe mixed state with psychosis, consisting of depressed mood, excessive guilt, hyperreligious obsessions and guilty. Symptoms ultimately stabilized on combination of lexapro and zyprexa. Risperdal, " "lithium and lamictal trials failed. (Lithium helpful for stone but not depression). of note prior stone has been associated with substance use.     Patient needs ABU treatment for her Alcohol use disorder, severe with last drink on 5/16, previously drinking 1/5th per day, use affecting marriage and threatening daughters wedding, patient unable to control use, uses in dangerous circumstances (while driving, cooking), has interfered with responsibilities (leaving burners on) and affecting mood (hx of bipolar disorder, now with more mood lability per familY). Patient has also relapsed on opioids and needs treatment for this as well. She will need close monitoring due to concurrent bipolar disorder and possible connection of substances to her mood state. "     Pt seen this morning as part of ABU intake process. Pt reports that she is "okay, other than wanting to drink". Pt  first drink was in high school but drinking became a problem about 10 yrs ago. Pt reports that this relapse was related to the it was the level of stress, once she starts she is "off to the races". Pt started drinking again 2 to 2.5 months ago. Pt reports that she drinks up a 1/5th whiskey a day. Also drinking wine and tequila, interchangable to whiskey. Pt reports having cravings, noting that her ability to maintain her usual family duties was impaired, pt noting having hopeless about her ability to stop drinking. Pt admits to drinking and driving, discussed tolerance of not feeling intoxicated last week while BAL was 0.128. Pt reports having some withdrawal symptoms, including mild nausea and tremor.  Last drink was last Wednesday. Pt was advised to start a klonopin detox at last visit, however found recommended dose of klonopin 0.5mg tid  was too much, and instead  took 1/4 of  a 1mg pill 2 times a day, last time was 2 days ago. Pt reports that she is back to taking klonopin 0.5mg  at night.  Pt reports that naltrexone was very helpful in " "reducing cravings in the past, is interested in restarting it.      Pt reports "I'm addicted to tramadol too".  Pt started taking tramadol about 3 yrs ago for back pain,  Reports that she started misusing it within 2 to 3 weeks.  Pt  attempts to help monitor her use by keeping it away from her and distributing prescribed doses, however pt admits that she will pour some out of bottle when she picks it up so that she can take more and will hold on to doses so that she can take them all at once. Pt will take will take up to seven 50mg tramadol  at one time. Last took tramadol yesterday. Pt reports that it is a problem in her marriage and her  daughter also concerned,  admits to forgetfulness associated  with tramadol use. Pt feels that use has gotten out of control. Pt reports " I like the way it feels", euphoric and "sorta a buzz". Little diarrhea associated with decreased doses.      Pt reports that she was depressed during her visit in the fall, denied feeling hypomanic during the spring. Pt report that she was "full of anxiety, angst" which pushed her to start drinking again. Pt denied increased impulsivity, no irritably. Pt had decreased usual activity, reports that energy levels are actually low, endorsed anhedonia, low motivation (stopped going to the grocery store), poor concentration/difficulty with organization. Pt reports hopelessness but it was mostly about her alcohol use. Sleep is consistent with use of klonopin. Pt denied guilt. Not having hallucinations.   Anxiety is still present, no panic attacks. Not suicidal or homicidal      Last drink: Wednesday May 16  Average consumption: 1/5th of whiskey/day   Withdrawal: yes, mild   Spouse/partner consumption: yes,  drinks   Rehab/Detox: yes   AA/NA: while in ABU only,      Collateral: spoke with pt's  Rai Garner ([phone])- Pt mood has been stable up until about 2 months ago, he started noting increased pattern of high [employer] and " grocery bills without corresponding products (initially unclear as he was unable to do usual bill keeping for a while). Believes that pt was drinking during the day and then attempting to stop in enough time to not appear or smell intoxicated when he got home in the evenings. Friends have also noticed that she was drinking but did not want to speak about it until they were 100% sure. Per  pt can be manipulative about her substance abuse.  also worried that the frequency of mood episodes is increasing, believes that episode leading to hospitalization two years ago was also alcohol related. Pt has thrown out whiskey in front of him and promised that she hadn't stashed other alcohol.  agreed to toss out beer in home and willing to check liquids in home for being replaced with alcohol. Discussed eventual use of naltrexone in treatment.

## 2018-05-23 NOTE — PATIENT CARE CONFERENCE
ABU Staffing:      Alcohol use disorder - severe  Bipolar II Disorder     1. Pt is attending all groups    2. Pt is attending all meetings  3. Pt 's has minimally supportive family  4. Pt has completed spiritual assessment    5. Pt will present life story    6. Pt will present Step One assignment    7. Pt is exploring issues related to relapse  prevention; spirituality; stress management; improved communication skills; assertiveness training; poor self-esteem; disease concepts; cross addictions; and, work related issues    8. D/C date: TBD     Staff discussed pt's psychosocial hx related to multiple [facility], ABU course, h/o bipolar dx, family h/o substance abuse, strained family relationships, and marital discord.  Staff discussed pt's report of severe cravings. Staff discussed pt's alcohol use hx and sipping a 5th of alcohol throughout the day. Staff discussed  Pt's guarded bx and low motivation to attend tx. Staff discussed pt's high risk for relapse and candidacy for naltrexone and antabuse. Staff discussed pt's tramadol use. Staff discussed pt's medication management and regiment of Zyprexa and Lexapro. Staff discussed having spouse attend family day.       Problem: Alcohol use Disorder, Severe  Goal: Address in 12 step meetings and group and individual sessions    Objective Measure: participation in groups, self report, length of sobriety, and relapse prevention plan  Time: Prior to discharge    Progress: Pt is attending groups and sessions      Problem: Opiate Use Disorder, moderate to severe  Goal: Address in 12 step meetings and group and individual sessions    Objective Measure: participation in groups, self report, length of sobriety, and relapse prevention plan  Time: Prior to discharge    Progress: Pt is attending groups and sessions    Problem: Bipolar I Disorder, MRE depressed  Goal: Address in 12 step meetings and group and individual sessions    Objective Measure: participation in groups, self report,  length of sobriety, and relapse prevention plan  Time: Prior to discharge    Progress: Pt is attending groups and sessions     Staff members present:    MD Dr. Sander Wood MD Fellow   Dr. Vipul MD Resident  Lisa Epps, [facility]  Brent Alfaro, [facility]   Jean-Paul Porter, [facility]

## 2018-05-23 NOTE — PROGRESS NOTES
"2018 11:28 AM  Name: Martha Garner  : 1950  Start Date: 18    [facility] Intensive Outpatient Program   Progress Note    Status: Intensive Outpatient Program (IOP)    HPI:  Pt admitted on 18 with alcohol use disorder, opiate use disorder, and bipolar I disorder. Pt had relapse with alcohol use about 2 months ago, reports that she has been abusing her prescribed tramadol since it was started. Pt also struggling with mood symptoms, has been both depressed by her reports and irritable by family reports. Please see H&P dated 18 for full details.     SUBJECTIVE:     Interval Hx:  Pt seen today, affect constricted, does appear slightly brighter but when noting this and asking about it pt states "I don't feel brighter." Discussed again that opiate based pain management, such as tramadol are no longer effective once someone takes naltrexone.    Pt reports that she is still having cravings, and is still interested in naltrexone trial, initiated today. Hasn't started gabapentin yet, did not know it was available at the pharmacy, planning to pick it up today after ABU.    Pt did attend an AA meeting as required by program, no sponsor yet.     Pt not suicidal at this time, admits to having had SI in the past. Pt not homicidal.     Medication Side Effects: None  Cravings: yes  No other acute psychiatric issues reported at this time.    Scheduled Meds:   Current Outpatient Prescriptions on File Prior to Encounter   Medication Sig Dispense Refill    calcium citrate (CALCITRATE) 200 mg (950 mg) tablet Take 3 tablets (600 mg total) by mouth 3 (three) times daily with meals. 90 tablet 0    clonazePAM (KLONOPIN) 1 MG tablet Take 0.5-1 tablets (0.5-1 mg total) by mouth nightly as needed for Anxiety. 30 tablet 0    escitalopram oxalate (LEXAPRO) 10 MG tablet TAKE 1&1/2 TABLETS BY MOUTH ONCE DAILY 45 tablet 3    gabapentin (NEURONTIN) 300 MG capsule Take 1 capsule (300 mg total) by mouth 3 (three) times daily. " "90 capsule 0    naltrexone (DEPADE) 50 mg tablet Take 1 tablet (50 mg total) by mouth once daily. 30 tablet 0    OLANZapine (ZYPREXA) 10 MG tablet TAKE ONE TABLET BY MOUTH EVERY EVENING 30 tablet 3    olanzapine (ZYPREXA) 2.5 MG tablet Take 1-2 tablets (2.5-5 mg total) by mouth every evening. 60 tablet 3     No current facility-administered medications on file prior to encounter.      Allergies:  Patient has no known allergies.          OBJECTIVE:     Vital Signs (Most Recent):  There were no vitals filed for this visit.    Mental Status Exam:  Appearance: age appropriate, casually dressed, neatly groomed  Behavior/Cooperation: cooperative, eye contact normal  Speech: normal tone, normal volume, non-spontaneous  Mood: "not bright"  Affect: constricted   Thought Process: goal-directed, logical  Thought Content: normal, no suicidality, no homicidality, delusions, or paranoia  Orientation: person, place, situation  Memory: Grossly intact  Attention Span/Concentration: Grossly intact  Cognition: grossly intact  Insight: fair  Judgment: good    Laboratory:  Recent Results (from the past 168 hour(s))   Toxicology screen, urine    Collection Time: 05/21/18 11:10 AM   Result Value Ref Range    Alcohol, Urine <10 <10 mg/dL    Benzodiazepines Negative     Methadone metabolites Negative     Cocaine (Metab.) Negative     Opiate Scrn, Ur Negative     Barbiturate Screen, Ur Negative     Amphetamine Screen, Ur Negative     THC Negative     Phencyclidine Negative     Creatinine, Random Ur 149.0 15.0 - 325.0 mg/dL    Toxicology Information SEE COMMENT    POCT BREATH ALCOHOL TEST    Collection Time: 05/21/18 11:11 AM   Result Value Ref Range    Breath Alcohol 0.00    COMPREHENSIVE METABOLIC PANEL    Collection Time: 05/21/18  3:21 PM   Result Value Ref Range    Sodium 142 136 - 145 mmol/L    Potassium 4.4 3.5 - 5.1 mmol/L    Chloride 107 95 - 110 mmol/L    CO2 25 23 - 29 mmol/L    Glucose 105 70 - 110 mg/dL    BUN, Bld 16 8 - 23 " mg/dL    Creatinine 0.8 0.5 - 1.4 mg/dL    Calcium 9.4 8.7 - 10.5 mg/dL    Total Protein 7.0 6.0 - 8.4 g/dL    Albumin 3.8 3.5 - 5.2 g/dL    Total Bilirubin 0.4 0.1 - 1.0 mg/dL    Alkaline Phosphatase 70 55 - 135 U/L    AST 25 10 - 40 U/L    ALT 26 10 - 44 U/L    Anion Gap 10 8 - 16 mmol/L    eGFR if African American >60.0 >60 mL/min/1.73 m^2    eGFR if non African American >60.0 >60 mL/min/1.73 m^2   CBC W/ AUTO DIFFERENTIAL    Collection Time: 05/21/18  3:21 PM   Result Value Ref Range    WBC 5.75 3.90 - 12.70 K/uL    RBC 4.74 4.00 - 5.40 M/uL    Hemoglobin 14.5 12.0 - 16.0 g/dL    Hematocrit 47.4 37.0 - 48.5 %     (H) 82 - 98 fL    MCH 30.6 27.0 - 31.0 pg    MCHC 30.6 (L) 32.0 - 36.0 g/dL    RDW 12.9 11.5 - 14.5 %    Platelets 268 150 - 350 K/uL    MPV 10.2 9.2 - 12.9 fL    Immature Granulocytes 0.3 0.0 - 0.5 %    Gran # (ANC) 3.4 1.8 - 7.7 K/uL    Immature Grans (Abs) 0.02 0.00 - 0.04 K/uL    Lymph # 1.4 1.0 - 4.8 K/uL    Mono # 0.6 0.3 - 1.0 K/uL    Eos # 0.3 0.0 - 0.5 K/uL    Baso # 0.05 0.00 - 0.20 K/uL    nRBC 0 0 /100 WBC    Gran% 58.4 38.0 - 73.0 %    Lymph% 24.2 18.0 - 48.0 %    Mono% 10.6 4.0 - 15.0 %    Eosinophil% 5.6 0.0 - 8.0 %    Basophil% 0.9 0.0 - 1.9 %    Differential Method Automated    FOLATE    Collection Time: 05/21/18  3:21 PM   Result Value Ref Range    Folate 8.2 4.0 - 24.0 ng/mL   VITAMIN B12    Collection Time: 05/21/18  3:21 PM   Result Value Ref Range    Vitamin B-12 750 210 - 950 pg/mL   POCT BREATH ALCOHOL TEST    Collection Time: 05/22/18  2:34 PM   Result Value Ref Range    Breath Alcohol 0.00    Toxicology screen, urine    Collection Time: 05/23/18 10:26 AM   Result Value Ref Range    Alcohol, Urine <10 <10 mg/dL    Benzodiazepines Negative     Methadone metabolites Negative     Cocaine (Metab.) Negative     Opiate Scrn, Ur Negative     Barbiturate Screen, Ur Negative     Amphetamine Screen, Ur Negative     THC Negative     Phencyclidine Negative     Creatinine, Random Ur  100.0 15.0 - 325.0 mg/dL    Toxicology Information SEE COMMENT    POCT BREATH ALCOHOL TEST    Collection Time: 05/23/18 10:26 AM   Result Value Ref Range    Breath Alcohol 0.00      ASSESSMENT:     Alcohol Use Disorder, severe  Opiate Use Disorder, moderate to severe   Bipolar I Disorder, MRE depressed     PLAN:     · Continue patient on ABU protocol.  · Breathalyzer daily and urine toxicology three times a week.   · VS daily x3 days.  · Patient counseled on abstinence from alcohol, tramadol     Medications:   · Will continue with zyprexa 10mg qhs, Klonopin 1mg qhs, and Lexapro 15mg daily for management of bipolar disorder  · Start Gabapentin 300mg tid as off label treatment for alcohol cravings  · Gave test dose of naltrexone 25mg during program activities 5/23, continue to monitor    Status: Continue treatment on ABU    Patient's Intervention Response: guarded    Case discussed with Holley Smith MD.      Charo Boucher MD   [facility]  Psych PGY5

## 2018-05-23 NOTE — PROGRESS NOTES
Group Psychotherapy (PhD/LCSW)    Site: [facility]    Clinical status of patient: Intensive Outpatient Program (IOP)    Date: 5/23/2018    Group Focus: ACT Group Therapy    Length of service: 84888 - 45-50 minutes    Number of patients in attendance: 12    Referred by: Addictive Behavior Unit Treatment Team    Target symptoms: Alcohol Abuse    Patient's response to treatment: Active Listening; Self-disclosure     Progress toward goals: Progressing adequately    Interval History: Session focus was Fusion and Defusion.  Patients learned about longterm words and longterm conversations.  They were instructed to write and re-write their own longterm conversations.    Diagnosis: Alcohol Use d/o, severe dependence     Plan: Continue treatment on ABU

## 2018-05-24 ENCOUNTER — HOSPITAL ENCOUNTER (OUTPATIENT)
Dept: PSYCHIATRY | Facility: HOSPITAL | Age: 68
Discharge: HOME | End: 2018-05-24
Attending: PSYCHIATRY & NEUROLOGY
Payer: COMMERCIAL

## 2018-05-24 DIAGNOSIS — F10.20 ALCOHOL USE DISORDER, SEVERE, DEPENDENCE: Primary | ICD-10-CM

## 2018-05-24 LAB
BREATH ALCOHOL: 0
ETHYL GLUCURONIDE: NEGATIVE

## 2018-05-24 PROCEDURE — 99232 SBSQ HOSP IP/OBS MODERATE 35: CPT | Mod: ,,, | Performed by: PSYCHIATRY & NEUROLOGY

## 2018-05-24 PROCEDURE — 90853 GROUP PSYCHOTHERAPY: CPT

## 2018-05-24 PROCEDURE — 90853 GROUP PSYCHOTHERAPY: CPT | Mod: ,,, | Performed by: PSYCHOLOGIST

## 2018-05-24 PROCEDURE — 90853 GROUP PSYCHOTHERAPY: CPT | Performed by: SOCIAL WORKER

## 2018-05-24 NOTE — PLAN OF CARE
05/23/18 1400   Activity/Group Therapy Checklist   Group Relapse Prevention   Attendance Attended   Follows Direction Followed directions   Group Interactions/Observations Interacted appropriately   Affect/Mood Range Normal range   Affect/Mood Display Appropriate   Goal Progression Progressing

## 2018-05-24 NOTE — PROGRESS NOTES
Group Psychotherapy (PhD/LCSW)    Site: [facility]    Clinical status of patient: Intensive Outpatient Program (IOP)    Date: 5/24/2018    Group Focus: Stress Management       Length of service: 67143 - 45-50 minutes    Number of patients in attendance: 15    Referred by: Addictive Behavior Unit Treatment Team    Target symptoms: Alcohol Abuse    Patient's response to treatment: Active Listening; Self-disclosure     Progress toward goals: Progressing adequately    Interval History: Discussed the role of maladaptive beliefs in creating difficulties for managing stress. Demonstrated the way maladaptive beliefs can arise out of unresolved fx of origin issues and how insight into this can enhance the ability to manage stress..     Diagnosis: Alcohol Use d/o, severe dependence     Plan: Continue treatment on ABU

## 2018-05-24 NOTE — PROGRESS NOTES
Group Psychotherapy (PhD/LCSW)    Site: [facility]    Clinical status of patient: Intensive Outpatient Program (IOP)    Date: 5/24/2018    Group Focus: Psychodynamic Group Psycotherapy    Length of service: 56037 - 45-50 minutes    Number of patients in attendance: 8    Referred by: Addictive Behavior Unit Treatment Team    Target symptoms: alcohol abuse    Patient's response to treatment: Active Listening and Self-disclosure    Progress toward goals: Progressing adequately    Interval History: Shared her story with new group member.    Diagnosis: alcohol use disorder, severe, dependence    Plan: Continue treatment on ABU

## 2018-05-24 NOTE — PROGRESS NOTES
"2018 11:28 AM  Name: Martha Garner  : 1950  Start Date: 18    [facility] Intensive Outpatient Program   Progress Note    Status: Intensive Outpatient Program (IOP)    HPI:  Pt admitted on 18 with alcohol use disorder, opiate use disorder, and bipolar I disorder. Pt had relapse with alcohol use about 2 months ago, reports that she has been abusing her prescribed tramadol since it was started. Pt also struggling with mood symptoms, has been both depressed by her reports and irritable by family reports. Please see H&P dated 18 for full details.     SUBJECTIVE:     Interval Hx:  Pt seen this morning, mood is "alright" today. Reports that program activities are going great but feels bored because she has completed program in the past. Pt started naltrexone yesterday, cravings reduced from 8- 9/10 yesterday to 5/10 this morning. Pt reports that her cravings are so difficult because " I just like to drink, I like the buzz" and she would like to go home sit down on the couch to drink and watch tv throughout the day. Pt unable to identify any internal motivators for continuing sobriety, "I don't know". Pt offers external motivator of her  being angry if she were to relapse.     Not having trouble sleeping, energy is "a little better", pt has restarted on her B12 vitamins. Pt not suicidal or homicidal. Not having hallucinations.     Pt continues to attend AA meeting as required by program, no sponsor yet.         Medication Side Effects: None  Cravings: yes  No other acute psychiatric issues reported at this time.    Scheduled Meds:   Current Outpatient Prescriptions on File Prior to Encounter   Medication Sig Dispense Refill    calcium citrate (CALCITRATE) 200 mg (950 mg) tablet Take 3 tablets (600 mg total) by mouth 3 (three) times daily with meals. 90 tablet 0    clonazePAM (KLONOPIN) 1 MG tablet Take 0.5-1 tablets (0.5-1 mg total) by mouth nightly as needed for Anxiety. 30 tablet 0    " "escitalopram oxalate (LEXAPRO) 10 MG tablet TAKE 1&1/2 TABLETS BY MOUTH ONCE DAILY 45 tablet 3    gabapentin (NEURONTIN) 300 MG capsule Take 1 capsule (300 mg total) by mouth 3 (three) times daily. 90 capsule 0    naltrexone (DEPADE) 50 mg tablet Take 1 tablet (50 mg total) by mouth once daily. 30 tablet 0    OLANZapine (ZYPREXA) 10 MG tablet TAKE ONE TABLET BY MOUTH EVERY EVENING 30 tablet 3    olanzapine (ZYPREXA) 2.5 MG tablet Take 1-2 tablets (2.5-5 mg total) by mouth every evening. 60 tablet 3     No current facility-administered medications on file prior to encounter.      Allergies:  Patient has no known allergies.          OBJECTIVE:     Vital Signs (Most Recent):  There were no vitals filed for this visit.    Mental Status Exam:  Appearance: unremarkable, casually dressed  Behavior/Cooperation: friendly and cooperative, eye contact normal  Speech: normal tone, normal volume, non-spontaneous  Mood: "alright"  Affect: more reactive, but still mostly constricted   Thought Process: goal-directed, logical  Thought Content: not suicidal or homicidal  Orientation: person, place, situation  Memory: Grossly intact  Attention Span/Concentration: Grossly intact  Cognition: grossly intact  Insight: fair  Judgment: good    Laboratory:  Recent Results (from the past 168 hour(s))   Toxicology screen, urine    Collection Time: 05/21/18 11:10 AM   Result Value Ref Range    Alcohol, Urine <10 <10 mg/dL    Benzodiazepines Negative     Methadone metabolites Negative     Cocaine (Metab.) Negative     Opiate Scrn, Ur Negative     Barbiturate Screen, Ur Negative     Amphetamine Screen, Ur Negative     THC Negative     Phencyclidine Negative     Creatinine, Random Ur 149.0 15.0 - 325.0 mg/dL    Toxicology Information SEE COMMENT    Alcohol Biomarkers, urine    Collection Time: 05/21/18 11:10 AM   Result Value Ref Range    Ethyl Glucuronide NEGATIVE    POCT BREATH ALCOHOL TEST    Collection Time: 05/21/18 11:11 AM   Result " Value Ref Range    Breath Alcohol 0.00    COMPREHENSIVE METABOLIC PANEL    Collection Time: 05/21/18  3:21 PM   Result Value Ref Range    Sodium 142 136 - 145 mmol/L    Potassium 4.4 3.5 - 5.1 mmol/L    Chloride 107 95 - 110 mmol/L    CO2 25 23 - 29 mmol/L    Glucose 105 70 - 110 mg/dL    BUN, Bld 16 8 - 23 mg/dL    Creatinine 0.8 0.5 - 1.4 mg/dL    Calcium 9.4 8.7 - 10.5 mg/dL    Total Protein 7.0 6.0 - 8.4 g/dL    Albumin 3.8 3.5 - 5.2 g/dL    Total Bilirubin 0.4 0.1 - 1.0 mg/dL    Alkaline Phosphatase 70 55 - 135 U/L    AST 25 10 - 40 U/L    ALT 26 10 - 44 U/L    Anion Gap 10 8 - 16 mmol/L    eGFR if African American >60.0 >60 mL/min/1.73 m^2    eGFR if non African American >60.0 >60 mL/min/1.73 m^2   CBC W/ AUTO DIFFERENTIAL    Collection Time: 05/21/18  3:21 PM   Result Value Ref Range    WBC 5.75 3.90 - 12.70 K/uL    RBC 4.74 4.00 - 5.40 M/uL    Hemoglobin 14.5 12.0 - 16.0 g/dL    Hematocrit 47.4 37.0 - 48.5 %     (H) 82 - 98 fL    MCH 30.6 27.0 - 31.0 pg    MCHC 30.6 (L) 32.0 - 36.0 g/dL    RDW 12.9 11.5 - 14.5 %    Platelets 268 150 - 350 K/uL    MPV 10.2 9.2 - 12.9 fL    Immature Granulocytes 0.3 0.0 - 0.5 %    Gran # (ANC) 3.4 1.8 - 7.7 K/uL    Immature Grans (Abs) 0.02 0.00 - 0.04 K/uL    Lymph # 1.4 1.0 - 4.8 K/uL    Mono # 0.6 0.3 - 1.0 K/uL    Eos # 0.3 0.0 - 0.5 K/uL    Baso # 0.05 0.00 - 0.20 K/uL    nRBC 0 0 /100 WBC    Gran% 58.4 38.0 - 73.0 %    Lymph% 24.2 18.0 - 48.0 %    Mono% 10.6 4.0 - 15.0 %    Eosinophil% 5.6 0.0 - 8.0 %    Basophil% 0.9 0.0 - 1.9 %    Differential Method Automated    FOLATE    Collection Time: 05/21/18  3:21 PM   Result Value Ref Range    Folate 8.2 4.0 - 24.0 ng/mL   VITAMIN B12    Collection Time: 05/21/18  3:21 PM   Result Value Ref Range    Vitamin B-12 750 210 - 950 pg/mL   POCT BREATH ALCOHOL TEST    Collection Time: 05/22/18  2:34 PM   Result Value Ref Range    Breath Alcohol 0.00    Toxicology screen, urine    Collection Time: 05/23/18 10:26 AM   Result  Value Ref Range    Alcohol, Urine <10 <10 mg/dL    Benzodiazepines Negative     Methadone metabolites Negative     Cocaine (Metab.) Negative     Opiate Scrn, Ur Negative     Barbiturate Screen, Ur Negative     Amphetamine Screen, Ur Negative     THC Negative     Phencyclidine Negative     Creatinine, Random Ur 100.0 15.0 - 325.0 mg/dL    Toxicology Information SEE COMMENT    POCT BREATH ALCOHOL TEST    Collection Time: 05/23/18 10:26 AM   Result Value Ref Range    Breath Alcohol 0.00      ASSESSMENT:     Alcohol Use Disorder, severe  Opiate Use Disorder, moderate to severe   Bipolar I Disorder, MRE depressed     PLAN:     · Continue patient on ABU protocol.  · Breathalyzer daily and urine toxicology three times a week.   · VS daily x3 days.  · Patient counseled on abstinence from alcohol, tramadol     Medications:   · Will continue with zyprexa 10mg qhs, Klonopin 1mg qhs, and Lexapro 15mg daily for management of bipolar disorder  · Gabapentin 300mg tid as off label treatment for alcohol cravings  · Continue Naltrexone 50mg daily for alcohol cravings     Status: Continue treatment on ABU    Patient's Intervention Response: accepting    Case discussed with Holley Smith MD.      Charo Boucher MD   [facility]  Psych PGY5

## 2018-05-24 NOTE — PLAN OF CARE
05/24/18 1000   Activity/Group Therapy Checklist   Group Relapse Prevention   Attendance Attended   Follows Direction Followed directions   Group Interactions/Observations Interacted appropriately   Affect/Mood Range Blunted/flat

## 2018-05-24 NOTE — PLAN OF CARE
05/24/18 1400   Activity/Group Therapy Checklist   Group Life Skills  (Stress Maps)   Attendance Attended   Follows Direction Followed directions   Group Interactions/Observations Interacted appropriately   Affect/Mood Range Normal range   Affect/Mood Display Appropriate   Goal Progression Progressing

## 2018-05-25 ENCOUNTER — HOSPITAL ENCOUNTER (OUTPATIENT)
Dept: PSYCHIATRY | Facility: HOSPITAL | Age: 68
Discharge: HOME | End: 2018-05-25
Attending: PSYCHIATRY & NEUROLOGY
Payer: COMMERCIAL

## 2018-05-25 VITALS — DIASTOLIC BLOOD PRESSURE: 77 MMHG | TEMPERATURE: 99 F | HEART RATE: 52 BPM | SYSTOLIC BLOOD PRESSURE: 144 MMHG

## 2018-05-25 DIAGNOSIS — F10.20 ALCOHOL USE DISORDER, SEVERE, DEPENDENCE: Primary | ICD-10-CM

## 2018-05-25 PROCEDURE — 90853 GROUP PSYCHOTHERAPY: CPT | Mod: 59,,, | Performed by: PSYCHOLOGIST

## 2018-05-25 PROCEDURE — 90853 GROUP PSYCHOTHERAPY: CPT | Performed by: SOCIAL WORKER

## 2018-05-25 NOTE — PROGRESS NOTES
"Group Psychotherapy (PhD/LCSW)    Site: [facility]    Clinical status of patient: Intensive Outpatient Program (IOP)    Date: 5/25/2018    Group Focus: Psychodynamic Group Psycotherapy    Length of service: 40324 - 45-50 minutes    Number of patients in attendance: 7    Referred by: Addictive Behavior Unit Treatment Team    Target symptoms: alcohol abuse    Patient's response to treatment: Active Listening and Self-disclosure    Progress toward goals: Progressing adequately    Interval History: Pt reports she is less depressed today. She said that the Naltrexone is helping with the cravings. However, she acknowledges that she "knows that she can't drink but still believes maybe she can."     Diagnosis: alcohol use disorder, severe, dependence    Plan: Continue treatment on ABU        "

## 2018-05-25 NOTE — PROGRESS NOTES
Group Psychotherapy (PhD/LCSW)    Site: [facility]    Clinical status of patient: Intensive Outpatient Program (IOP)    Date: 5/25/2018    Group Focus: Stress Management       Length of service: 71575 - 45-50 minutes    Number of patients in attendance: 12    Referred by: Addictive Behavior Unit Treatment Team    Target symptoms: Alcohol Abuse    Patient's response to treatment: Active Listening;      Progress toward goals: Progressing adequately    Interval History: Discussed the way 12-step principles and practices provide helpful guidelines for managing stress and strengthening resilience in addressing life challenges. Offered examples of how they can enhnance the ability to cope more effectively with depression and anxiety, as well as alcohol and substance problems.     Diagnosis: Alcohol Use d/o, severe dependence     Plan: Continue treatment on ABU

## 2018-05-25 NOTE — PLAN OF CARE
05/25/18 1400   Activity/Group Therapy Checklist   Group Goals/Reflection  (incomplete prompts )   Attendance Attended   Follows Direction Followed directions   Group Interactions/Observations Interacted appropriately   Affect/Mood Range Blunted/flat   Affect/Mood Display Appropriate

## 2018-05-25 NOTE — PLAN OF CARE
05/25/18 1000   Activity/Group Therapy Checklist   Group Life Skills  (Stress Maps continued)   Attendance Attended   Follows Direction Followed directions   Group Interactions/Observations Interacted appropriately   Affect/Mood Range Normal range   Affect/Mood Display Appropriate   Goal Progression Progressing

## 2018-05-28 ENCOUNTER — HOSPITAL ENCOUNTER (OUTPATIENT)
Dept: PSYCHIATRY | Facility: HOSPITAL | Age: 68
Discharge: HOME | End: 2018-05-28
Attending: PSYCHIATRY & NEUROLOGY
Payer: COMMERCIAL

## 2018-05-28 VITALS — HEART RATE: 66 BPM | SYSTOLIC BLOOD PRESSURE: 135 MMHG | DIASTOLIC BLOOD PRESSURE: 72 MMHG | RESPIRATION RATE: 18 BRPM

## 2018-05-28 DIAGNOSIS — F10.20 ALCOHOL USE DISORDER, SEVERE, DEPENDENCE: Primary | ICD-10-CM

## 2018-05-28 LAB
AMPHET+METHAMPHET UR QL: NEGATIVE
BARBITURATES UR QL SCN>200 NG/ML: NEGATIVE
BENZODIAZ UR QL SCN>200 NG/ML: NEGATIVE
BREATH ALCOHOL: 0
BZE UR QL SCN: NEGATIVE
CANNABINOIDS UR QL SCN: NEGATIVE
CREAT UR-MCNC: 273 MG/DL
ETHANOL UR-MCNC: <10 MG/DL
METHADONE UR QL SCN>300 NG/ML: NEGATIVE
OPIATES UR QL SCN: NEGATIVE
PCP UR QL SCN>25 NG/ML: NEGATIVE
TOXICOLOGY INFORMATION: NORMAL

## 2018-05-28 PROCEDURE — 80307 DRUG TEST PRSMV CHEM ANLYZR: CPT

## 2018-05-28 PROCEDURE — 90853 GROUP PSYCHOTHERAPY: CPT | Mod: 59,,, | Performed by: PSYCHOLOGIST

## 2018-05-28 PROCEDURE — 90853 GROUP PSYCHOTHERAPY: CPT | Performed by: SOCIAL WORKER

## 2018-05-28 PROCEDURE — 90853 GROUP PSYCHOTHERAPY: CPT

## 2018-05-28 NOTE — PROGRESS NOTES
Group Psychotherapy (PhD/LCSW)    Site: [facility]    Clinical status of patient: Intensive Outpatient Program (IOP)    Date: 5/28/2018    Group Focus: Psychodynamic Group Psycotherapy    Length of service: 39832 - 45-50 minutes    Number of patients in attendance: 4    Referred by: Addictive Behavior Unit Treatment Team    Target symptoms: alcohol abuse    Patient's response to treatment: Active Listening and Self-disclosure    Progress toward goals: Progressing adequately    Interval History: Pt reports she continues to have cravings. She stated that she was bored this weekend and that boredom is a trigger for her. Group offered her suggestions for managing boredom (things she could do when bored and ways of managing the cravings).     Diagnosis: alcohol use disorder, severe, dependence    Plan: Continue treatment on ABU

## 2018-05-28 NOTE — PROGRESS NOTES
Group Psychotherapy (PhD/LCSW)    Site: [facility]    Clinical status of patient: Intensive Outpatient Program (IOP)    Date: 5/28/2018    Group Focus: Stress Management       Length of service: 88734 - 45-50 minutes    Number of patients in attendance:  9    Referred by: Addictive Behavior Unit Treatment Team    Target symptoms: Alcohol Abuse    Patient's response to treatment: Active Listening;      Progress toward goals: Progressing adequately    Interval History: Group focused how to transform You-messages into  I-messages. Noted the way I-messages work better to promote dialog and/or to define boundaries. Examples given.    Diagnosis: Alcohol Use d/o, severe dependence     Plan: Continue treatment on ABU

## 2018-05-28 NOTE — PLAN OF CARE
05/28/18 1000   Activity/Group Therapy Checklist   Group Goals/Reflection  (miracle question  )   Attendance Attended   Follows Direction Followed directions   Group Interactions/Observations Interacted appropriately   Affect/Mood Range Blunted/flat

## 2018-05-29 ENCOUNTER — HOSPITAL ENCOUNTER (OUTPATIENT)
Dept: PSYCHIATRY | Facility: HOSPITAL | Age: 68
Discharge: HOME | End: 2018-05-29
Attending: PSYCHIATRY & NEUROLOGY
Payer: COMMERCIAL

## 2018-05-29 DIAGNOSIS — F10.20 ALCOHOL USE DISORDER, SEVERE, DEPENDENCE: Primary | ICD-10-CM

## 2018-05-29 LAB — BREATH ALCOHOL: 0

## 2018-05-29 PROCEDURE — 99232 SBSQ HOSP IP/OBS MODERATE 35: CPT | Mod: ,,, | Performed by: PSYCHIATRY & NEUROLOGY

## 2018-05-29 PROCEDURE — 90853 GROUP PSYCHOTHERAPY: CPT

## 2018-05-29 PROCEDURE — 90834 PSYTX W PT 45 MINUTES: CPT | Mod: 59,,, | Performed by: PSYCHOLOGIST

## 2018-05-29 PROCEDURE — 90853 GROUP PSYCHOTHERAPY: CPT | Mod: ,,, | Performed by: PSYCHOLOGIST

## 2018-05-29 NOTE — PROGRESS NOTES
Group Psychotherapy (PhD/LCSW)    Site: [facility]    Clinical status of patient: Intensive Outpatient Program (IOP)    Date: 5/29/2018    Group Focus: CBT Group Therapy    Length of service: 26684 - 45-50 minutes    Number of patients in attendance: 10    Referred by: Addictive Behavior Unit Treatment Team    Target symptoms: Alcohol Abuse    Patient's response to treatment: Active Listening; Self-disclosure     Progress toward goals: Progressing adequately    Interval History: Session focus was Cognitive Restructuring:  Identifying unhelpful and helpful thoughts.  Patients were provided with a worksheet on unhelpful thinking styles and how to identify helpful thoughts.  Patient identified wanting to work on the unhelpful thinking style(s) of magnification.     Diagnosis: Alcohol Use d/o, severe dependence     Plan: Continue treatment on ABU

## 2018-05-29 NOTE — PROGRESS NOTES
Group Psychotherapy (PhD/LCSW)    Site: [facility]    Clinical status of patient: Intensive Outpatient Program (IOP)    Date: 5/29/2018    Group Focus: Disease Model of Addiction      Length of service: 04515 - 45-50 minutes    Number of patients in attendance: 6    Referred by: Addictive Behavior Unit Treatment Team    Target symptoms: Alcohol Abuse    Patient's response to treatment: Active Listening; Self-disclosure     Progress toward goals: Progressing adequately    Interval History: Discussed the basic neuropsychological concepts of the Disease Model of Addiction and how they related to the phenomena of addiction (eg, powerlessness; cravings; euphoric recall; relapse; tolerance; etc). Noted the value of understanding the Disease Model for sustaining long-term sobriety.    Diagnosis: Alcohol Use d/o, severe dependence     Plan: Continue treatment on ABU

## 2018-05-29 NOTE — PROGRESS NOTES
"2018 11:28 AM  Name: Martha Garner  : 1950  Start Date: 18    ABU Intensive Outpatient Program   Progress Note    Status: Intensive Outpatient Program (IOP)    HPI:  Pt admitted on 18 with alcohol use disorder, opiate use disorder, and bipolar I disorder. Pt had relapse with alcohol use about 2 months ago, reports that she has been abusing her prescribed tramadol since it was started. Pt also struggling with mood symptoms, has been both depressed by her reports and irritable by family reports. Please see H&P dated 18 for full details.     SUBJECTIVE:     Interval Hx:  Pt seen this morning, pt reports that her mood is "good, better". Pt has not returned to walking daily, "I don't have enough psychic energy", " I'm just not interested". Pt reports that she is still having cravings, thinks hermann the naltrexone is not working as well as it had in the past. Pt request reduced dose of gabapentin during the day, 300mg is making her feel tired during the day. Also reports that the gabapentin is not as helpful for pain as tramadol.  Has re-intergrated into her old AA group, [facility]. Continues to feel very disappointed about her relapse. Able to idenitfy ways to help a future relapse- as in calling her sponsor or engaging with her psychiatrist more.      Medication Side Effects: None  Cravings: yes  No other acute psychiatric issues reported at this time.    Scheduled Meds:   Current Outpatient Prescriptions on File Prior to Encounter   Medication Sig Dispense Refill    calcium citrate (CALCITRATE) 200 mg (950 mg) tablet Take 3 tablets (600 mg total) by mouth 3 (three) times daily with meals. 90 tablet 0    clonazePAM (KLONOPIN) 1 MG tablet Take 0.5-1 tablets (0.5-1 mg total) by mouth nightly as needed for Anxiety. 30 tablet 0    escitalopram oxalate (LEXAPRO) 10 MG tablet TAKE 1&1/2 TABLETS BY MOUTH ONCE DAILY 45 tablet 3    gabapentin (NEURONTIN) 300 MG capsule Take 1 capsule " "(300 mg total) by mouth 3 (three) times daily. 90 capsule 0    naltrexone (DEPADE) 50 mg tablet Take 1 tablet (50 mg total) by mouth once daily. 30 tablet 0    OLANZapine (ZYPREXA) 10 MG tablet TAKE ONE TABLET BY MOUTH EVERY EVENING 30 tablet 3    olanzapine (ZYPREXA) 2.5 MG tablet Take 1-2 tablets (2.5-5 mg total) by mouth every evening. 60 tablet 3     No current facility-administered medications on file prior to encounter.      Allergies:  Patient has no known allergies.          OBJECTIVE:     Vital Signs (Most Recent):  There were no vitals filed for this visit.    Mental Status Exam:  Appearance: age appropriate, normal weight, casually dressed, neatly groomed  Behavior/Cooperation: cooperative, eye contact normal, pleasent   Speech: normal tone, normal volume, non-spontaneous  Mood: "good, better"  Affect: reactive and mood congruent   Thought Process: goal-directed, logical  Thought Content: no delusional thought content   Orientation: person, place, situation  Memory: Grossly intact  Attention Span/Concentration: Grossly intact  Cognition: grossly intact  Insight: fair  Judgment: good    Laboratory:  Recent Results (from the past 168 hour(s))   POCT BREATH ALCOHOL TEST    Collection Time: 05/22/18  2:34 PM   Result Value Ref Range    Breath Alcohol 0.00    Toxicology screen, urine    Collection Time: 05/23/18 10:26 AM   Result Value Ref Range    Alcohol, Urine <10 <10 mg/dL    Benzodiazepines Negative     Methadone metabolites Negative     Cocaine (Metab.) Negative     Opiate Scrn, Ur Negative     Barbiturate Screen, Ur Negative     Amphetamine Screen, Ur Negative     THC Negative     Phencyclidine Negative     Creatinine, Random Ur 100.0 15.0 - 325.0 mg/dL    Toxicology Information SEE COMMENT    POCT BREATH ALCOHOL TEST    Collection Time: 05/23/18 10:26 AM   Result Value Ref Range    Breath Alcohol 0.00    POCT BREATH ALCOHOL TEST    Collection Time: 05/24/18  1:35 PM   Result Value Ref Range    " Breath Alcohol 0.00    Toxicology screen, urine    Collection Time: 05/28/18 11:08 AM   Result Value Ref Range    Alcohol, Urine <10 <10 mg/dL    Benzodiazepines Negative     Methadone metabolites Negative     Cocaine (Metab.) Negative     Opiate Scrn, Ur Negative     Barbiturate Screen, Ur Negative     Amphetamine Screen, Ur Negative     THC Negative     Phencyclidine Negative     Creatinine, Random Ur 273.0 15.0 - 325.0 mg/dL    Toxicology Information SEE COMMENT    POCT BREATH ALCOHOL TEST    Collection Time: 05/28/18 11:11 AM   Result Value Ref Range    Breath Alcohol 0.00    POCT BREATH ALCOHOL TEST    Collection Time: 05/29/18 11:29 AM   Result Value Ref Range    Breath Alcohol 0.00      ASSESSMENT:     Alcohol Use Disorder, severe  Opiate Use Disorder, moderate to severe   Bipolar I Disorder, MRE depressed     PLAN:     · Continue patient on ABU protocol.  · Breathalyzer daily and urine toxicology three times a week.   · VS daily x3 days.  · Patient counseled on abstinence from alcohol, tramadol     Medications:   · Will continue with zyprexa 10mg qhs, Klonopin 0.5mg qhs, and Lexapro 15mg daily for management of bipolar disorder  · Will decrease Gabapentin to 100mg qam and noon, and 300mg qhs as current dosing is making pt tired  · Continue Naltrexone 50mg daily for alcohol cravings     Status: Continue treatment on ABU    Patient's Intervention Response: accepting    Case discussed with Holley Smith MD.      Charo Boucher MD   [facility]  Psych PGY5

## 2018-05-29 NOTE — PROGRESS NOTES
"[facility]  Psychology  Progress Note  Individual Psychotherapy (PhD/LCSW)    Patient Name: Martha Garner  MRN: [id number]    Patient Class: OP- Behavioral Recurring   Admission Date: 5/29/2018  Hospital Length of Stay: 0 days  Attending Physician: Holley Smith MD  Primary Care Provider: Jaxson Copeland MD    Therapeutic Intervention: Met with patient.  Outpatient - Insight oriented psychotherapy 45 min - CPT code 02482    Chief Complaint/Reason for Encounter: addictive disorder and depression     Interval History and Content of Current Session: Pt referred by ABU staff for individual psychotherapy session. Pt reports that she is less depressed because she is having less problems with cravings with the help of the Naltrexone she is taking. She discussed her guilt over having relapsed. She states that she had convinced herself that "had it" (ie that she could maintain control of etoh and drink moderately). She states that she is accepting that her biology is such that she can't control the impact of substances. She notes that her father, all of her sibs, and her two children all have substance problems (as well as her cousins in her extended family). She discussed the way the alcohol and opioids offered her temporary relief from depression while ultimately making it worse. So a vicious [place] set in where depression reinforced using and vice versa. She also discussed the vicious [place] between depression and feeling that she was "unsaved". She notes that her [employer] is very important to her and when she questions her worthiness to be saved, she gets depressed and vice versa (another vicious [place]). Logically she understands that "just because she feels bad doesn't mean she is bad." She was able to see that growing up in a dysfunctional, alcoholic family left damage to her self-esteem that makes her vulnerable to self-esteem issues and that doesn't mean she is not saved in a Yazidism " sense. Pt noted that she now has her eye on a woman to sponsor her ane will approach that person soon.     Risk Parameters:  Patient reports no suicidal ideation  Patient reports no homicidal ideation  Patient reports no self-injurious behavior  Patient reports no violent behavior    Verbal Deficits: None    Patient's response to intervention:  The patient's response to intervention is accepting.    Progress toward goals and other mental status changes:  The patient's progress toward goals is fair .    Diagnostic Impression - Plan:     Diagnosis: Alcohol Use d/o, severe; Bipolar d/o, MRE depressed.     Treatment Plan:  · Target symptoms: alcohol abuse, depression  · Why chosen therapy is appropriate versus another modality: relevant to diagnosis, patient responds to this modality  · Outcome monitoring methods: self-report, observation, chart notes   · Therapeutic intervention type: insight oriented psychotherapy    Plan:  ABU    Return to Clinic: as scheduled    Length of Service (minutes): 45    Mehdi Ignacio, PhD  Psychology  [facility]

## 2018-05-29 NOTE — PROGRESS NOTES
Group Psychotherapy (PhD/LCSW)    Site: [facility]    Clinical status of patient: Intensive Outpatient Program (IOP)    Date: 5/29/2018    Group Focus: Psychodynamic Group Psycotherapy    Length of service: 13508 - 45-50 minutes    Number of patients in attendance: 5    Referred by: Addictive Behavior Unit Treatment Team    Target symptoms: alcohol abuse    Patient's response to treatment: Active Listening and Self-disclosure    Progress toward goals: Progressing adequately    Interval History: Shared her story with new group member.    Diagnosis: alcohol use disorder, severe, dependence    Plan: Continue treatment on ABU

## 2018-05-30 ENCOUNTER — HOSPITAL ENCOUNTER (OUTPATIENT)
Dept: PSYCHIATRY | Facility: HOSPITAL | Age: 68
Discharge: HOME | End: 2018-05-30
Attending: PSYCHIATRY & NEUROLOGY
Payer: COMMERCIAL

## 2018-05-30 DIAGNOSIS — F10.20 ALCOHOL USE DISORDER, SEVERE, DEPENDENCE: Primary | ICD-10-CM

## 2018-05-30 LAB
AMPHET+METHAMPHET UR QL: NEGATIVE
BARBITURATES UR QL SCN>200 NG/ML: NEGATIVE
BENZODIAZ UR QL SCN>200 NG/ML: NEGATIVE
BREATH ALCOHOL: 0
BZE UR QL SCN: NEGATIVE
CANNABINOIDS UR QL SCN: NEGATIVE
CREAT UR-MCNC: 77 MG/DL
ETHANOL UR-MCNC: <10 MG/DL
METHADONE UR QL SCN>300 NG/ML: NEGATIVE
OPIATES UR QL SCN: NEGATIVE
PCP UR QL SCN>25 NG/ML: NEGATIVE
TOXICOLOGY INFORMATION: NORMAL

## 2018-05-30 PROCEDURE — 80307 DRUG TEST PRSMV CHEM ANLYZR: CPT

## 2018-05-30 PROCEDURE — 90853 GROUP PSYCHOTHERAPY: CPT

## 2018-05-30 PROCEDURE — 90853 GROUP PSYCHOTHERAPY: CPT | Performed by: SOCIAL WORKER

## 2018-05-30 PROCEDURE — 90853 GROUP PSYCHOTHERAPY: CPT | Mod: ,,, | Performed by: PSYCHOLOGIST

## 2018-05-30 NOTE — PROGRESS NOTES
Group Psychotherapy (PhD/LCSW)    Site: [facility]    Clinical status of patient: Intensive Outpatient Program (IOP)    Date: 5/30/2018    Group Focus: CBT Group Therapy    Length of service: 59087 - 45-50 minutes    Number of patients in attendance: 10    Referred by: Addictive Behavior Unit Treatment Team    Target symptoms: Alcohol Abuse    Patient's response to treatment: Active Listening; Self-disclosure     Progress toward goals: Progressing adequately    Interval History: Session focus was Cognitive Restructuring (Part 2):  Identifying unhelpful and helpful thoughts.  Patients were provided with a worksheet on unhelpful thinking styles and how to identify helpful thoughts.  Patients were provided with examples of unhelpful thoughts and worked in groups to identify the unhelpful thinking style.      Diagnosis: Alcohol Use d/o, severe dependence     Plan: Continue treatment on ABU

## 2018-05-30 NOTE — PROGRESS NOTES
Group Psychotherapy (PhD/LCSW)    Site: [facility]    Clinical status of patient: Intensive Outpatient Program (IOP)    Date: 5/30/2018    Group Focus: Psychodynamic Group Psycotherapy    Length of service: 28489 - 45-50 minutes    Number of patients in attendance: 5    Referred by: Addictive Behavior Unit Treatment Team    Target symptoms: alcohol abuse    Patient's response to treatment: Active Listening and Self-disclosure    Progress toward goals: Progressing adequately    Interval History: Shared her story with new group member.    Diagnosis: alcohol use disorder, severe, dependence    Plan: Continue treatment on ABU

## 2018-05-30 NOTE — PLAN OF CARE
05/30/18 1400   Activity/Group Therapy Checklist   Group Goals/Reflection  (life story )   Attendance Attended   Follows Direction Followed directions   Group Interactions/Observations Interacted appropriately   Affect/Mood Range Normal range   Affect/Mood Display Appropriate   Goal Progression Progressing

## 2018-05-30 NOTE — PATIENT CARE CONFERENCE
ABU Staffing:      Alcohol use disorder - severe  Bipolar II Disorder     1. Pt is attending all groups    2. Pt is attending all meetings  3. Pt 's has minimally supportive family  4. Pt has completed spiritual assessment    5. Pt will present life story    6. Pt will present Step One assignment    7. Pt is exploring issues related to relapse  prevention; spirituality; stress management; improved communication skills; assertiveness training; poor self-esteem; disease concepts; cross addictions; and, work related issues    8. D/C date: TBD     Staff discussed pt's continued reports of cravings. Staffing discussed pt's mental status appearing to improve and participating appropriately in groups.     Problem: Alcohol use Disorder, Severe  Goal: Address in 12 step meetings and group and individual sessions    Objective Measure: participation in groups, self report, length of sobriety, and relapse prevention plan  Time: Prior to discharge    Progress: Pt is attending groups and sessions      Problem: Opiate Use Disorder, moderate to severe  Goal: Address in 12 step meetings and group and individual sessions    Objective Measure: participation in groups, self report, length of sobriety, and relapse prevention plan  Time: Prior to discharge    Progress: Pt is attending groups and sessions    Problem: Bipolar I Disorder, MRE depressed  Goal: Address in 12 step meetings and group and individual sessions    Objective Measure: participation in groups, self report, length of sobriety, and relapse prevention plan  Time: Prior to discharge    Progress: Pt is attending groups and sessions     Staff members present:    MD Dr. Sander Wood MD Fellow   Dr. Baird Ph.D.  Lisa Epps, [facility]  Jean-Paul Porter [facility]

## 2018-05-31 ENCOUNTER — HOSPITAL ENCOUNTER (OUTPATIENT)
Dept: PSYCHIATRY | Facility: HOSPITAL | Age: 68
Discharge: HOME | End: 2018-05-31
Attending: PSYCHIATRY & NEUROLOGY
Payer: COMMERCIAL

## 2018-05-31 DIAGNOSIS — F10.20 ALCOHOL USE DISORDER, SEVERE, DEPENDENCE: Primary | ICD-10-CM

## 2018-05-31 LAB — BREATH ALCOHOL: 0

## 2018-05-31 PROCEDURE — 90853 GROUP PSYCHOTHERAPY: CPT

## 2018-05-31 PROCEDURE — 90853 GROUP PSYCHOTHERAPY: CPT | Performed by: SOCIAL WORKER

## 2018-05-31 PROCEDURE — 90853 GROUP PSYCHOTHERAPY: CPT | Mod: ,,, | Performed by: PSYCHOLOGIST

## 2018-05-31 PROCEDURE — 99232 SBSQ HOSP IP/OBS MODERATE 35: CPT | Mod: ,,, | Performed by: PSYCHIATRY & NEUROLOGY

## 2018-05-31 RX ORDER — GABAPENTIN 100 MG/1
CAPSULE ORAL
Qty: 60 CAPSULE | Refills: 1 | Status: SHIPPED | OUTPATIENT
Start: 2018-05-31 | End: 2018-06-21

## 2018-05-31 NOTE — PLAN OF CARE
05/31/18 1000   Activity/Group Therapy Checklist   Group Relapse Prevention   Attendance Attended   Follows Direction Followed directions   Group Interactions/Observations Interacted appropriately;Anxious   Affect/Mood Range Normal range   Affect/Mood Display Appropriate

## 2018-05-31 NOTE — PLAN OF CARE
05/31/18 1400   Activity/Group Therapy Checklist   Group Addiction Education  (Motivation for Recovery)   Attendance Attended   Follows Direction Followed directions   Group Interactions/Observations Interacted appropriately   Affect/Mood Range Normal range   Affect/Mood Display Appropriate   Goal Progression Progressing

## 2018-05-31 NOTE — PROGRESS NOTES
Group Psychotherapy (PhD/LCSW)    Site: [facility]    Clinical status of patient: Intensive Outpatient Program (IOP)    Date: 5/31/2018    Group Focus: CBT Group Therapy    Length of service: 08560 - 45-50 minutes    Number of patients in attendance: 11    Referred by: Addictive Behavior Unit Treatment Team    Target symptoms: Alcohol Abuse    Patient's response to treatment: Active Listening; Self-disclosure     Progress toward goals: Progressing adequately    Interval History: Session focus was Cognitive Restructuring:  Using the ABCD model to help identify helpful thoughts in situations.  Patients were encouraged to identify antecedents (triggers), beliefs, consequences/feelings, and different (helpful) thoughts.    Diagnosis: Alcohol Use d/o, severe dependence     Plan: Continue treatment on ABU

## 2018-05-31 NOTE — PROGRESS NOTES
"2018 11:28 AM  Name: Martha Garner  : 1950  Start Date: 18    ABU Intensive Outpatient Program   Progress Note    Status: Intensive Outpatient Program (IOP)    HPI:  Pt admitted on 18 with alcohol use disorder, opiate use disorder, and bipolar I disorder. Pt had relapse with alcohol use about 2 months ago, reports that she has been abusing her prescribed tramadol since it was started. Pt also struggling with mood symptoms, has been both depressed by her reports and irritable by family reports. Please see H&P dated 18 for full details.     SUBJECTIVE:     Interval Hx:  Pt seen this morning, pt reports that her mood is "better", "more at peace". Energy level is "picking up a little bit". Still has not returned to her walking routine. Pt continues to report that her sleep is stable. No delusional thought content noted in interview.     Pt reports that while her alcohol cravings are starting to decrease. However, pt continues to have thoughts of relapsing on opiates, "If there were a bottle in front of me, I'd take it". Reports that it is the "drug of choice" for her. Pt admits that in the past she would steal meds from husbands medical office when drug reps would bring it as samples. Pt reports that opiates always improve her mood. Discussed alternative things that have been helping her mood, ie knitting.     Medication Side Effects: None  Cravings: yes  No other acute psychiatric issues reported at this time.    Scheduled Meds:   Current Outpatient Prescriptions on File Prior to Encounter   Medication Sig Dispense Refill    calcium citrate (CALCITRATE) 200 mg (950 mg) tablet Take 3 tablets (600 mg total) by mouth 3 (three) times daily with meals. 90 tablet 0    clonazePAM (KLONOPIN) 1 MG tablet Take 0.5-1 tablets (0.5-1 mg total) by mouth nightly as needed for Anxiety. 30 tablet 0    escitalopram oxalate (LEXAPRO) 10 MG tablet TAKE 1&1/2 TABLETS BY MOUTH ONCE DAILY 45 tablet 3    " "gabapentin (NEURONTIN) 300 MG capsule Take 1 capsule (300 mg total) by mouth 3 (three) times daily. 90 capsule 0    naltrexone (DEPADE) 50 mg tablet Take 1 tablet (50 mg total) by mouth once daily. 30 tablet 0    OLANZapine (ZYPREXA) 10 MG tablet TAKE ONE TABLET BY MOUTH EVERY EVENING 30 tablet 3    olanzapine (ZYPREXA) 2.5 MG tablet Take 1-2 tablets (2.5-5 mg total) by mouth every evening. 60 tablet 3     No current facility-administered medications on file prior to encounter.      Allergies:  Patient has no known allergies.          OBJECTIVE:     Vital Signs (Most Recent):  There were no vitals filed for this visit.    Mental Status Exam:  Appearance: unremarkable, casually dressed  Behavior/Cooperation: friendly and cooperative, eye contact normal  Speech: normal tone, normal volume, non-spontaneous  Mood: "better, more at peace"  Affect: reactive and mood congruent   Thought Process: goal-directed, logical  Thought Content: no delusional thought content   Orientation: person, place, situation  Memory: Grossly intact  Attention Span/Concentration: Grossly intact  Cognition: grossly intact  Insight: fair  Judgment: good    Laboratory:  Recent Results (from the past 168 hour(s))   POCT BREATH ALCOHOL TEST    Collection Time: 05/24/18  1:35 PM   Result Value Ref Range    Breath Alcohol 0.00    Toxicology screen, urine    Collection Time: 05/28/18 11:08 AM   Result Value Ref Range    Alcohol, Urine <10 <10 mg/dL    Benzodiazepines Negative     Methadone metabolites Negative     Cocaine (Metab.) Negative     Opiate Scrn, Ur Negative     Barbiturate Screen, Ur Negative     Amphetamine Screen, Ur Negative     THC Negative     Phencyclidine Negative     Creatinine, Random Ur 273.0 15.0 - 325.0 mg/dL    Toxicology Information SEE COMMENT    POCT BREATH ALCOHOL TEST    Collection Time: 05/28/18 11:11 AM   Result Value Ref Range    Breath Alcohol 0.00    POCT BREATH ALCOHOL TEST    Collection Time: 05/29/18 11:29 AM "   Result Value Ref Range    Breath Alcohol 0.00    Toxicology screen, urine    Collection Time: 05/30/18 10:27 AM   Result Value Ref Range    Alcohol, Urine <10 <10 mg/dL    Benzodiazepines Negative     Methadone metabolites Negative     Cocaine (Metab.) Negative     Opiate Scrn, Ur Negative     Barbiturate Screen, Ur Negative     Amphetamine Screen, Ur Negative     THC Negative     Phencyclidine Negative     Creatinine, Random Ur 77.0 15.0 - 325.0 mg/dL    Toxicology Information SEE COMMENT    POCT BREATH ALCOHOL TEST    Collection Time: 05/30/18 10:29 AM   Result Value Ref Range    Breath Alcohol 0.00      ASSESSMENT:     Alcohol Use Disorder, severe  Opiate Use Disorder, moderate to severe   Bipolar I Disorder, MRE depressed     PLAN:     · Continue patient on ABU protocol.  · Breathalyzer daily and urine toxicology three times a week.   · Patient counseled on abstinence from alcohol, tramadol     Medications:   · Will continue with zyprexa 10mg qhs, Klonopin 0.5mg qhs, and Lexapro 15mg daily for management of bipolar disorder  · Continue with gabapentin 100mg qam and noon, and 300mg qhs  · Continue Naltrexone 50mg daily for alcohol cravings     Status: Continue treatment on ABU    Patient's Intervention Response: accepting    Case discussed with Holley Smith MD.      Charo Boucher MD   [facility]  Psych PGY5

## 2018-06-01 ENCOUNTER — HOSPITAL ENCOUNTER (OUTPATIENT)
Dept: PSYCHIATRY | Facility: HOSPITAL | Age: 68
Discharge: HOME | End: 2018-06-01
Attending: PSYCHIATRY & NEUROLOGY
Payer: COMMERCIAL

## 2018-06-01 VITALS — HEART RATE: 67 BPM | DIASTOLIC BLOOD PRESSURE: 67 MMHG | SYSTOLIC BLOOD PRESSURE: 128 MMHG | RESPIRATION RATE: 20 BRPM

## 2018-06-01 DIAGNOSIS — F10.20 ALCOHOL USE DISORDER, SEVERE, DEPENDENCE: Primary | ICD-10-CM

## 2018-06-01 LAB — BREATH ALCOHOL: 0

## 2018-06-01 PROCEDURE — 90853 GROUP PSYCHOTHERAPY: CPT | Performed by: SOCIAL WORKER

## 2018-06-01 PROCEDURE — 90853 GROUP PSYCHOTHERAPY: CPT | Mod: ,,, | Performed by: PSYCHOLOGIST

## 2018-06-01 PROCEDURE — 80307 DRUG TEST PRSMV CHEM ANLYZR: CPT

## 2018-06-01 PROCEDURE — 90853 GROUP PSYCHOTHERAPY: CPT

## 2018-06-01 RX ORDER — ESCITALOPRAM OXALATE 20 MG/1
20 TABLET ORAL DAILY
Qty: 45 TABLET | Refills: 0 | Status: SHIPPED | OUTPATIENT
Start: 2018-06-01 | End: 2018-06-06

## 2018-06-01 RX ORDER — ESCITALOPRAM OXALATE 10 MG/1
10 TABLET ORAL DAILY
Qty: 45 TABLET | Refills: 0 | Status: SHIPPED | OUTPATIENT
Start: 2018-06-01 | End: 2018-06-06

## 2018-06-01 NOTE — PLAN OF CARE
06/01/18 1000   Activity/Group Therapy Checklist   Group Other (Comments)  (perception of self )   Attendance Attended   Follows Direction Followed directions   Group Interactions/Observations Interacted appropriately;Anxious   Affect/Mood Range Normal range   Affect/Mood Display Appropriate   Goal Progression Progressing

## 2018-06-01 NOTE — PROGRESS NOTES
Group Psychotherapy (PhD/LCSW)    Site: [facility]    Clinical status of patient: Intensive Outpatient Program (IOP)    Date: 6/1/2018    Group Focus: Psychodynamic Group Psycotherapy    Length of service: 10120 - 45-50 minutes    Number of patients in attendance: 6    Referred by: Addictive Behavior Unit Treatment Team    Target symptoms: alcohol abuse    Patient's response to treatment: Active Listening and Self-disclosure    Progress toward goals: Progressing adequately    Interval History:  Pt states that her cravings for etoh continue to diminish. She is finding it unsettling to work on her story as it brings up bad memories.     Diagnosis: alcohol use disorder, severe, dependence    Plan: Continue treatment on ABU

## 2018-06-01 NOTE — PROGRESS NOTES
Group Psychotherapy (PhD/LCSW)    Site: [facility]    Clinical status of patient: Intensive Outpatient Program (IOP)    Date: 5/31/2018    Group Focus: Psychodynamic Group Psycotherapy    Length of service: 54657 - 45-50 minutes    Number of patients in attendance: 6    Referred by: Addictive Behavior Unit Treatment Team    Target symptoms: alcohol abuse    Patient's response to treatment: Active Listening and Self-disclosure    Progress toward goals: Progressing adequately    Interval History:  Discussed potential sponsor at [facility].    Diagnosis: alcohol use disorder, severe, dependence    Plan: Continue treatment on ABU

## 2018-06-01 NOTE — PLAN OF CARE
06/01/18 1400   Activity/Group Therapy Checklist   Group Relapse Prevention  (Guilt & Shame)   Attendance Attended   Follows Direction Followed directions   Group Interactions/Observations Interacted appropriately   Affect/Mood Range Normal range   Affect/Mood Display Appropriate   Goal Progression Progressing

## 2018-06-01 NOTE — PROGRESS NOTES
Group Psychotherapy (PhD/LCSW)    Site: [facility]    Clinical status of patient: Intensive Outpatient Program (IOP)    Date: 06/01/2018    Group Focus: Stress Management    Length of service: 43725 - 45-50 minutes    Number of patients in attendance: 11    Referred by: Addiction Behavioral Unit Treatment Team    Target symptoms: Anxiety, Depression    Patient's response to treatment: Active Listening    Progress toward goals: Progressing adequately    Interval History: Group learned mindfulness techniques (breath, senses, defusion imagery) to improve present-moment awareness, impulsive behavior tendencies, and tolerance of various emotional states.    Diagnosis: Alcohol Use Disorder    Plan: Continue treatment on ABU

## 2018-06-04 ENCOUNTER — HOSPITAL ENCOUNTER (OUTPATIENT)
Dept: PSYCHIATRY | Facility: HOSPITAL | Age: 68
Discharge: HOME | End: 2018-06-04
Attending: PSYCHIATRY & NEUROLOGY
Payer: COMMERCIAL

## 2018-06-04 VITALS — DIASTOLIC BLOOD PRESSURE: 63 MMHG | RESPIRATION RATE: 18 BRPM | HEART RATE: 63 BPM | SYSTOLIC BLOOD PRESSURE: 115 MMHG

## 2018-06-04 DIAGNOSIS — F10.20 ALCOHOL USE DISORDER, SEVERE, DEPENDENCE: Primary | ICD-10-CM

## 2018-06-04 LAB
AMPHET+METHAMPHET UR QL: NEGATIVE
AMPHET+METHAMPHET UR QL: NEGATIVE
BARBITURATES UR QL SCN>200 NG/ML: NEGATIVE
BARBITURATES UR QL SCN>200 NG/ML: NEGATIVE
BENZODIAZ UR QL SCN>200 NG/ML: NEGATIVE
BENZODIAZ UR QL SCN>200 NG/ML: NEGATIVE
BREATH ALCOHOL: 0
BZE UR QL SCN: NEGATIVE
BZE UR QL SCN: NEGATIVE
CANNABINOIDS UR QL SCN: NEGATIVE
CANNABINOIDS UR QL SCN: NEGATIVE
CREAT UR-MCNC: 156 MG/DL
CREAT UR-MCNC: 72 MG/DL
ETHANOL UR-MCNC: <10 MG/DL
ETHANOL UR-MCNC: <10 MG/DL
METHADONE UR QL SCN>300 NG/ML: NEGATIVE
METHADONE UR QL SCN>300 NG/ML: NEGATIVE
OPIATES UR QL SCN: NEGATIVE
OPIATES UR QL SCN: NEGATIVE
PCP UR QL SCN>25 NG/ML: NEGATIVE
PCP UR QL SCN>25 NG/ML: NEGATIVE
TOXICOLOGY INFORMATION: NORMAL
TOXICOLOGY INFORMATION: NORMAL

## 2018-06-04 PROCEDURE — 80307 DRUG TEST PRSMV CHEM ANLYZR: CPT

## 2018-06-04 PROCEDURE — 90853 GROUP PSYCHOTHERAPY: CPT

## 2018-06-04 PROCEDURE — 90853 GROUP PSYCHOTHERAPY: CPT | Performed by: SOCIAL WORKER

## 2018-06-04 PROCEDURE — 90853 GROUP PSYCHOTHERAPY: CPT | Mod: 59,,, | Performed by: PSYCHOLOGIST

## 2018-06-04 NOTE — PROGRESS NOTES
Group Psychotherapy (PhD/LCSW)    Site: [facility]    Clinical status of patient: Intensive Outpatient Program (IOP)    Date: 6/4/2018    Group Focus: Psychodynamic Group Psycotherapy    Length of service: 09320 - 45-50 minutes    Number of patients in attendance: 6    Referred by: Addictive Behavior Unit Treatment Team    Target symptoms: alcohol abuse    Patient's response to treatment: Active Listening and Self-disclosure    Progress toward goals: Progressing adequately    Interval History:  Pt shared her story with a new member of the group. Pt notes that she knows she needs to get a sponsor and this time she needs to work the steps.      Diagnosis: alcohol use disorder, severe, dependence    Plan: Continue treatment on ABU

## 2018-06-04 NOTE — PROGRESS NOTES
Group Psychotherapy (PhD/LCSW)    Site: [facility]    Clinical status of patient: Intensive Outpatient Program (IOP)    Date: 6/4/2018    Group Focus: Communication Skills       Length of service: 80838 - 45-50 minutes    Number of patients in attendance: 10    Referred by: Addictive Behavior Unit Treatment Team    Target symptoms: alcohol abuse    Patient's response to treatment: Active Listening and Self-disclosure    Progress toward goals: Progressing adequately    Interval History:  Discussed basic communication skills (I-messages; Reflective Listening; Approach/contextual considerations). Demonstrated how to apply them through modeling, role play and coaching in common interpersonal problems facing group members.     Diagnosis: alcohol use disorder, severe, dependence    Plan: Continue treatment on ABU

## 2018-06-04 NOTE — PLAN OF CARE
06/04/18 1000   Activity/Group Therapy Checklist   Group Goals/Reflection  (life story )   Attendance Attended   Follows Direction Followed directions   Group Interactions/Observations Interacted appropriately   Affect/Mood Range Normal range   Affect/Mood Display Appropriate

## 2018-06-04 NOTE — PROGRESS NOTES
"2018 11:28 AM  Name: Martha Garner  : 1950  Start Date: 18    ABU Intensive Outpatient Program   Progress Note    Status: Intensive Outpatient Program (IOP)    HPI:  Pt admitted on 18 with alcohol use disorder, opiate use disorder, and bipolar I disorder. Pt had relapse with alcohol use about 2 months ago, reports that she has been abusing her prescribed tramadol since it was started. Pt also struggling with mood symptoms, has been both depressed by her reports and irritable by family reports. Please see H&P dated 18 for full details.     SUBJECTIVE:     Interval Hx:  The patient notes that she is "struggling today" as she notes that she doesn't want to continue coming to this program. She notes that she has been able to maintain her sobriety. She notes that she is attending meetings and has the insight to recognize she needs the meetings to maintain her sobriety. She has yet to obtain a sponsor, and she is urged to do so. She notes that she is tolerating her current medications well without side effects. We plan to continue her current medication regimen and plan to adjust medications according to clinical status and side effect profile.     Medication Side Effects: None  Cravings: yes  No other acute psychiatric issues reported at this time.    Scheduled Meds:   Current Outpatient Prescriptions on File Prior to Encounter   Medication Sig Dispense Refill    calcium citrate (CALCITRATE) 200 mg (950 mg) tablet Take 3 tablets (600 mg total) by mouth 3 (three) times daily with meals. 90 tablet 0    clonazePAM (KLONOPIN) 1 MG tablet Take 0.5-1 tablets (0.5-1 mg total) by mouth nightly as needed for Anxiety. 30 tablet 0    escitalopram oxalate (LEXAPRO) 10 MG tablet TAKE 1&1/2 TABLETS BY MOUTH ONCE DAILY 45 tablet 3    escitalopram oxalate (LEXAPRO) 10 MG tablet Take 1 tablet (10 mg total) by mouth once daily. 45 tablet 0    escitalopram oxalate (LEXAPRO) 20 MG tablet Take 1 tablet (20 mg " "total) by mouth once daily. 45 tablet 0    gabapentin (NEURONTIN) 100 MG capsule Take one each morning and one at noon 60 capsule 1    gabapentin (NEURONTIN) 300 MG capsule Take 1 capsule (300 mg total) by mouth 3 (three) times daily. 90 capsule 0    naltrexone (DEPADE) 50 mg tablet Take 1 tablet (50 mg total) by mouth once daily. 30 tablet 0    OLANZapine (ZYPREXA) 10 MG tablet TAKE ONE TABLET BY MOUTH EVERY EVENING 30 tablet 3    olanzapine (ZYPREXA) 2.5 MG tablet Take 1-2 tablets (2.5-5 mg total) by mouth every evening. 60 tablet 3     No current facility-administered medications on file prior to encounter.      Allergies:  Patient has no known allergies.          OBJECTIVE:     Vital Signs (Most Recent):  Vitals:    06/04/18 1100   BP: 115/63   Pulse: 63   Resp: 18       Mental Status Exam:  Appearance: unremarkable, casually dressed  Behavior/Cooperation: friendly and cooperative, eye contact normal  Speech: normal tone, normal volume, non-spontaneous  Mood: "ok"  Affect: reactive and mood congruent   Thought Process: goal-directed, logical  Thought Content: no delusional thought content   Orientation: person, place, situation  Memory: Grossly intact  Attention Span/Concentration: Grossly intact  Cognition: grossly intact  Insight: fair  Judgment: good    Laboratory:  Recent Results (from the past 168 hour(s))   POCT BREATH ALCOHOL TEST    Collection Time: 05/29/18 11:29 AM   Result Value Ref Range    Breath Alcohol 0.00    Toxicology screen, urine    Collection Time: 05/30/18 10:27 AM   Result Value Ref Range    Alcohol, Urine <10 <10 mg/dL    Benzodiazepines Negative     Methadone metabolites Negative     Cocaine (Metab.) Negative     Opiate Scrn, Ur Negative     Barbiturate Screen, Ur Negative     Amphetamine Screen, Ur Negative     THC Negative     Phencyclidine Negative     Creatinine, Random Ur 77.0 15.0 - 325.0 mg/dL    Toxicology Information SEE COMMENT    POCT BREATH ALCOHOL TEST    Collection " Time: 05/30/18 10:29 AM   Result Value Ref Range    Breath Alcohol 0.00    POCT BREATH ALCOHOL TEST    Collection Time: 05/31/18  3:51 PM   Result Value Ref Range    Breath Alcohol 0.00    POCT BREATH ALCOHOL TEST    Collection Time: 06/01/18  8:53 AM   Result Value Ref Range    Breath Alcohol 0.00      ASSESSMENT:     Alcohol Use Disorder, severe  Opiate Use Disorder, moderate to severe   Bipolar I Disorder, MRE depressed     PLAN:     · Continue patient on ABU protocol.  · Breathalyzer daily and urine toxicology three times a week.   · Patient counseled on abstinence from alcohol, tramadol     Medications:   · Will continue with zyprexa 10mg qhs, Klonopin 0.5mg qhs, and Lexapro 15mg daily for management of bipolar disorder  · Continue with gabapentin 100mg qam and noon, and 300mg qhs  · Continue Naltrexone 50mg daily for alcohol cravings     Status: Continue treatment on ABU    Patient's Intervention Response: accepting      Savage Pimentel MD   [facility]  Psych Resident

## 2018-06-04 NOTE — PLAN OF CARE
06/04/18 1400   Activity/Group Therapy Checklist   Group Relapse Prevention  (1st step)   Attendance Attended   Follows Direction Followed directions   Group Interactions/Observations Interacted appropriately;Sharing  (distracted, needed prompting)   Affect/Mood Range Normal range   Affect/Mood Display Appropriate   Goal Progression Progressing

## 2018-06-05 ENCOUNTER — HOSPITAL ENCOUNTER (OUTPATIENT)
Dept: PSYCHIATRY | Facility: HOSPITAL | Age: 68
Discharge: HOME | End: 2018-06-05
Attending: PSYCHIATRY & NEUROLOGY
Payer: COMMERCIAL

## 2018-06-05 DIAGNOSIS — F10.20 ALCOHOL USE DISORDER, SEVERE, DEPENDENCE: Primary | ICD-10-CM

## 2018-06-05 LAB — BREATH ALCOHOL: 0

## 2018-06-05 PROCEDURE — 90853 GROUP PSYCHOTHERAPY: CPT | Mod: ,,, | Performed by: PSYCHOLOGIST

## 2018-06-05 PROCEDURE — 90853 GROUP PSYCHOTHERAPY: CPT

## 2018-06-05 PROCEDURE — 90853 GROUP PSYCHOTHERAPY: CPT | Performed by: SOCIAL WORKER

## 2018-06-05 NOTE — PROGRESS NOTES
Group Psychotherapy (MD)     Site: [facility]     Clinical status of patient: Intensive Outpatient Program (IOP)     Date: 5/23/18     Group Focus: Medical and Neuropsychiatric Bases of Psychiatric Disease and Addiction: Alcohol use     Length of service: 27980 - 45-50 minutes     Number of patients in attendance: 13     Referred by: Addictive Behavioral Unit Treatment Team     Target symptoms: Substance use, risk for substance use, self medicating behaviors, anxiety.      Patient's response to treatment: Active Listening       Progress toward goals: progressing adequately    Interval hx: Provided education about alcohol including effects on physical and medical health, explained neurobiology of alcohol effects and addictive circuits in the brain. Provided overview of concept of self medication and contrasted it with self care. Discussed role of anxiety in alcohol use disorder and contribution of alcohol use to anxiety and depression. had discussion of possible negative effects of alcohol use on various behaviors and symptoms.    Diagnosis: Alcohol use disorder, severe, dependence     Plan: Continue treatment on ABU

## 2018-06-05 NOTE — PROGRESS NOTES
Group Psychotherapy (PhD/LCSW)    Site: [facility]    Clinical status of patient: Intensive Outpatient Program (IOP)    Date: 6/5/2018    Group Focus: DBT-Based Group Therapy    Length of service: 52805 - 45-50 minutes    Number of patients in attendance: 14    Referred by: Addictive Behavior Unit Treatment Team    Target symptoms: Alcohol Abuse    Patient's response to treatment: Active Listening; Self-disclosure     Progress toward goals: Progressing adequately    Interval History: Session focus was Emotion Regulation:  Check the Facts.  Patients were encouraged to understand what their emotions do for them (motivate them to action, communicate to themselves and others).  They were encouraged to check the facts to ensure their emotion intensity fits the situation.    Diagnosis: Alcohol Use d/o, severe dependence     Plan: Continue treatment on ABU

## 2018-06-05 NOTE — PROGRESS NOTES
Group Psychotherapy (PhD/LCSW)    Site: [facility]    Clinical status of patient: Intensive Outpatient Program (IOP)    Date: 6/5/2018    Group Focus: Disease Model of Addiction      Length of service: 46259 - 45-50 minutes    Number of patients in attendance: 8    Referred by: Addictive Behavior Unit Treatment Team    Target symptoms: Alcohol Abuse    Patient's response to treatment: Active Listening; Self-disclosure     Progress toward goals: Progressing adequately    Interval History: Discussed the basic neurochemical concepts of the Disease model of Addiction and how they relate to the phenomena of addiction (eg, powerlessness, cravings, euphoric recall, tolerance, relapse, etc). Noted the way an understanding of the Disease Model comports with 12-step practices & activities and how it supports long-term sobriety.     Diagnosis: Alcohol Use d/o, severe dependence     Plan: Continue treatment on ABU

## 2018-06-05 NOTE — PROGRESS NOTES
Group Psychotherapy (PhD/LCSW)    Site: [facility]    Clinical status of patient: Intensive Outpatient Program (IOP)    Date: 6/5/2018    Group Focus: Psychodynamic Group Psycotherapy    Length of service: 59312 - 45-50 minutes    Number of patients in attendance: 7    Referred by: Addictive Behavior Unit Treatment Team    Target symptoms: alcohol abuse    Patient's response to treatment: Active Listening and Self-disclosure    Progress toward goals: Progressing adequately    Interval History:  Discussed recognition that alcoholism will need lifelong intervention.     Diagnosis: alcohol use disorder, severe, dependence    Plan: Continue treatment on ABU

## 2018-06-06 ENCOUNTER — HOSPITAL ENCOUNTER (OUTPATIENT)
Dept: PSYCHIATRY | Facility: HOSPITAL | Age: 68
Discharge: HOME | End: 2018-06-06
Attending: PSYCHIATRY & NEUROLOGY
Payer: COMMERCIAL

## 2018-06-06 VITALS — RESPIRATION RATE: 16 BRPM | DIASTOLIC BLOOD PRESSURE: 88 MMHG | SYSTOLIC BLOOD PRESSURE: 146 MMHG | HEART RATE: 54 BPM

## 2018-06-06 DIAGNOSIS — F10.20 ALCOHOL USE DISORDER, SEVERE, DEPENDENCE: Primary | ICD-10-CM

## 2018-06-06 LAB
AMPHET+METHAMPHET UR QL: NEGATIVE
BARBITURATES UR QL SCN>200 NG/ML: NEGATIVE
BENZODIAZ UR QL SCN>200 NG/ML: NEGATIVE
BREATH ALCOHOL: 0
BZE UR QL SCN: NEGATIVE
CANNABINOIDS UR QL SCN: NEGATIVE
CREAT UR-MCNC: 69 MG/DL
ETHANOL UR-MCNC: <10 MG/DL
METHADONE UR QL SCN>300 NG/ML: NEGATIVE
OPIATES UR QL SCN: NEGATIVE
PCP UR QL SCN>25 NG/ML: NEGATIVE
TOXICOLOGY INFORMATION: NORMAL

## 2018-06-06 PROCEDURE — 99232 SBSQ HOSP IP/OBS MODERATE 35: CPT | Mod: ,,, | Performed by: PSYCHIATRY & NEUROLOGY

## 2018-06-06 PROCEDURE — 90853 GROUP PSYCHOTHERAPY: CPT | Mod: ,,, | Performed by: PSYCHOLOGIST

## 2018-06-06 PROCEDURE — 80307 DRUG TEST PRSMV CHEM ANLYZR: CPT

## 2018-06-06 PROCEDURE — 90853 GROUP PSYCHOTHERAPY: CPT | Performed by: SOCIAL WORKER

## 2018-06-06 PROCEDURE — 90853 GROUP PSYCHOTHERAPY: CPT

## 2018-06-06 RX ORDER — ESCITALOPRAM OXALATE 20 MG/1
20 TABLET ORAL DAILY
Qty: 30 TABLET | Refills: 0 | Status: SHIPPED | OUTPATIENT
Start: 2018-06-06 | End: 2018-10-30

## 2018-06-06 NOTE — PROGRESS NOTES
"2018 11:28 AM  Name: Martha Garner  : 1950  Start Date: 18    ABU Intensive Outpatient Program   Progress Note    Status: Intensive Outpatient Program (IOP)    HPI:  Pt admitted on 18 with Chief Complaint of alcohol use disorder, opiate use disorder, and bipolar I disorder. Pt had relapse with alcohol use about 2 months ago, reports that she has been abusing her prescribed tramadol since it was started. Pt also struggling with mood symptoms, has been both depressed by her reports and irritable by family reports. Please see H&P dated 18 for full details.     SUBJECTIVE:     Interval Hx:  The patient notes that she is feeling "depressed" today. She is unable to specify why she is feeling this way. We plan to increase her Lexapro to 20 mg PO daily for mood and continue her other medications. We plan to closely monitor the patient's mood as this is a significant change from her stable baseline. If depression continues to worsen, hospitalization may be meritted. The patient agrees to inform staff if her depression continues to worsen and is able to verbally contract for safety. She notes that she is tolerating her medications well without side effects. We plan to continue her other medications at this time and plan to adjust medications according to clinical status and side effect profile.     Medication Side Effects: None  Cravings: yes  No other acute psychiatric issues reported at this time.    Scheduled Meds:   Current Outpatient Prescriptions on File Prior to Encounter   Medication Sig Dispense Refill    calcium citrate (CALCITRATE) 200 mg (950 mg) tablet Take 3 tablets (600 mg total) by mouth 3 (three) times daily with meals. 90 tablet 0    clonazePAM (KLONOPIN) 1 MG tablet Take 0.5-1 tablets (0.5-1 mg total) by mouth nightly as needed for Anxiety. 30 tablet 0    escitalopram oxalate (LEXAPRO) 10 MG tablet TAKE 1&1/2 TABLETS BY MOUTH ONCE DAILY 45 tablet 3    escitalopram oxalate " "(LEXAPRO) 10 MG tablet Take 1 tablet (10 mg total) by mouth once daily. 45 tablet 0    escitalopram oxalate (LEXAPRO) 20 MG tablet Take 1 tablet (20 mg total) by mouth once daily. 45 tablet 0    gabapentin (NEURONTIN) 100 MG capsule Take one each morning and one at noon 60 capsule 1    gabapentin (NEURONTIN) 300 MG capsule Take 1 capsule (300 mg total) by mouth 3 (three) times daily. 90 capsule 0    naltrexone (DEPADE) 50 mg tablet Take 1 tablet (50 mg total) by mouth once daily. 30 tablet 0    OLANZapine (ZYPREXA) 10 MG tablet TAKE ONE TABLET BY MOUTH EVERY EVENING 30 tablet 3    olanzapine (ZYPREXA) 2.5 MG tablet Take 1-2 tablets (2.5-5 mg total) by mouth every evening. 60 tablet 3     No current facility-administered medications on file prior to encounter.      Allergies:  Patient has no known allergies.          OBJECTIVE:     Vital Signs (Most Recent):  Vitals:    06/06/18 1147   BP: (!) 146/88   Pulse: (!) 54   Resp: 16       Mental Status Exam:  Appearance: unremarkable, casually dressed  Behavior/Cooperation: friendly and cooperative, eye contact normal  Speech: normal tone, normal volume, non-spontaneous  Mood: "ok"  Affect: reactive and mood congruent   Thought Process: goal-directed, logical  Thought Content: no delusional thought content   Orientation: person, place, situation  Memory: Grossly intact  Attention Span/Concentration: Grossly intact  Cognition: grossly intact  Insight: fair  Judgment: good    Laboratory:  Recent Results (from the past 168 hour(s))   POCT BREATH ALCOHOL TEST    Collection Time: 05/31/18  3:51 PM   Result Value Ref Range    Breath Alcohol 0.00    Toxicology screen, urine    Collection Time: 06/01/18  8:52 AM   Result Value Ref Range    Alcohol, Urine <10 <10 mg/dL    Benzodiazepines Negative     Methadone metabolites Negative     Cocaine (Metab.) Negative     Opiate Scrn, Ur Negative     Barbiturate Screen, Ur Negative     Amphetamine Screen, Ur Negative     THC Negative "     Phencyclidine Negative     Creatinine, Random Ur 72.0 15.0 - 325.0 mg/dL    Toxicology Information SEE COMMENT    POCT BREATH ALCOHOL TEST    Collection Time: 06/01/18  8:53 AM   Result Value Ref Range    Breath Alcohol 0.00    Toxicology screen, urine    Collection Time: 06/04/18 11:52 AM   Result Value Ref Range    Alcohol, Urine <10 <10 mg/dL    Benzodiazepines Negative     Methadone metabolites Negative     Cocaine (Metab.) Negative     Opiate Scrn, Ur Negative     Barbiturate Screen, Ur Negative     Amphetamine Screen, Ur Negative     THC Negative     Phencyclidine Negative     Creatinine, Random Ur 156.0 15.0 - 325.0 mg/dL    Toxicology Information SEE COMMENT    POCT BREATH ALCOHOL TEST    Collection Time: 06/04/18 11:53 AM   Result Value Ref Range    Breath Alcohol 0.00    POCT BREATH ALCOHOL TEST    Collection Time: 06/05/18 11:20 AM   Result Value Ref Range    Breath Alcohol 0.00      ASSESSMENT:     Alcohol Use Disorder, severe  Opiate Use Disorder, moderate to severe   Bipolar I Disorder, MRE depressed     PLAN:     · Continue patient on ABU protocol.  · Breathalyzer daily and urine toxicology three times a week.   · Patient counseled on abstinence from alcohol, tramadol     Medications:   · Will continue with zyprexa 10mg qhs, Klonopin 0.5mg qhs, and Lexapro 20 mg daily for management of bipolar disorder  · Continue with gabapentin 100mg qam and noon, and 300mg qhs  · Continue Naltrexone 50mg daily for alcohol cravings     Status: Continue treatment on ABU    Patient's Intervention Response: accepting      Savage Pimentel MD   [facility]  Psych Resident    Attending Attestation:    I have independently evaluated the patient and discussed the case with the resident. I have reviewed this note, edited it where appropriate, and agree with its current contents, including the assessment and plan.     Holley Smith MD

## 2018-06-06 NOTE — PLAN OF CARE
06/06/18 1400   Activity/Group Therapy Checklist   Group Goals/Reflection  (life story )   Attendance Attended   Follows Direction Followed directions   Group Interactions/Observations Interacted appropriately   Affect/Mood Range Normal range   Affect/Mood Display Appropriate   Goal Progression Progressing

## 2018-06-06 NOTE — PATIENT CARE CONFERENCE
ABU Staffing:      Alcohol use disorder - severe  Bipolar II Disorder     1. Pt is attending all groups    2. Pt is attending all meetings  3. Pt 's has minimally supportive family  4. Pt has completed spiritual assessment    5. Pt will present life story    6. Pt will present Step One assignment    7. Pt is exploring issues related to relapse  prevention; spirituality; stress management; improved communication skills; assertiveness training; poor self-esteem; disease concepts; cross addictions; and, work related issues    8. D/C date: TBD     Staff discussed pt's minimal participation in groups and increased insight of the longevity of alcohol use disorder. Staff discussed pt bipolar I dx and pt high risk for relapse on alcohol and opiates. Staff discussed possibly having pt  come in for family day. Staff discussed MD reaching out to .      Problem: Alcohol use Disorder, Severe  Goal: Address in 12 step meetings and group and individual sessions    Objective Measure: participation in groups, self report, length of sobriety, and relapse prevention plan  Time: Prior to discharge    Progress: Pt is attending groups and sessions      Problem: Opiate Use Disorder, moderate to severe  Goal: Address in 12 step meetings and group and individual sessions    Objective Measure: participation in groups, self report, length of sobriety, and relapse prevention plan  Time: Prior to discharge    Progress: Pt is attending groups and sessions    Problem: Bipolar I Disorder, MRE depressed  Goal: Address in 12 step meetings and group and individual sessions    Objective Measure: participation in groups, self report, length of sobriety, and relapse prevention plan  Time: Prior to discharge    Progress: Pt is attending groups and sessions     Staff members present:    MD Dr. Margarito Wood MD Dr. Orgeron, MD Dr. Correa, Ph.D.  Lisa Epps, [facility]  Brent Alfaro, [facility]  Deneen Martínez RN

## 2018-06-06 NOTE — PROGRESS NOTES
Group Psychotherapy (PhD/LCSW)    Site: [facility]    Clinical status of patient: Intensive Outpatient Program (IOP)    Date: 6/6/2018    Group Focus: DBT-Based Group Therapy    Length of service: 14792 - 45-50 minutes    Number of patients in attendance: 15    Referred by: Addictive Behavior Unit Treatment Team    Target symptoms: Alcohol Abuse    Patient's response to treatment: Active Listening; Self-disclosure     Progress toward goals: Progressing adequately    Interval History: Session focus was Emotion Regulation:  Opposite Action.  Patients identified action urges for each emotion and learned how to engage in opposite action when the emotions are not justified or unhelpful.    Diagnosis: Alcohol Use d/o, severe dependence     Plan: Continue treatment on ABU

## 2018-06-06 NOTE — PROGRESS NOTES
Group Psychotherapy (PhD/LCSW)    Site: [facility]    Clinical status of patient: Intensive Outpatient Program (IOP)    Date: 6/6/2018    Group Focus: Psychodynamic Group Psycotherapy    Length of service: 55632 - 45-50 minutes    Number of patients in attendance: 9    Referred by: Addictive Behavior Unit Treatment Team    Target symptoms: alcohol abuse    Patient's response to treatment: Active Listening and Self-disclosure    Progress toward goals: Progressing adequately    Interval History:  Shared her story with new group members.    Diagnosis: alcohol use disorder, severe, dependence    Plan: Continue treatment on ABU

## 2018-06-07 ENCOUNTER — HOSPITAL ENCOUNTER (OUTPATIENT)
Dept: PSYCHIATRY | Facility: HOSPITAL | Age: 68
Discharge: HOME | End: 2018-06-07
Attending: PSYCHIATRY & NEUROLOGY
Payer: COMMERCIAL

## 2018-06-07 DIAGNOSIS — F10.20 ALCOHOL USE DISORDER, SEVERE, DEPENDENCE: Primary | ICD-10-CM

## 2018-06-07 LAB — BREATH ALCOHOL: 0

## 2018-06-07 PROCEDURE — 90853 GROUP PSYCHOTHERAPY: CPT | Performed by: SOCIAL WORKER

## 2018-06-07 PROCEDURE — 90853 GROUP PSYCHOTHERAPY: CPT | Mod: ,,, | Performed by: PSYCHOLOGIST

## 2018-06-07 PROCEDURE — 90853 GROUP PSYCHOTHERAPY: CPT

## 2018-06-07 NOTE — PROGRESS NOTES
Group Psychotherapy (PhD/LCSW)    Site: [facility]    Clinical status of patient: Intensive Outpatient Program (IOP)    Date: 6/7/2018    Group Focus: DBT-Based Group Therapy    Length of service: 90083 - 45-50 minutes    Number of patients in attendance: 15    Referred by: Addictive Behavior Unit Treatment Team    Target symptoms: Alcohol Abuse    Patient's response to treatment: Active Listening; Self-disclosure     Progress toward goals: Progressing adequately    Interval History: Session focus was Emotion Regulation:  ABC PLEASE.  Patients were encouraged to reduce vulnerability to distress by accumulating positive emotions, building mastery, coping ahead, and by attending to physical well-being.  Each patient identified a way to accumulate positive emotions and build mastery.    Diagnosis: Alcohol Use d/o, severe dependence     Plan: Continue treatment on ABU

## 2018-06-07 NOTE — PROGRESS NOTES
Group Psychotherapy (PhD/LCSW)    Site: [facility]    Clinical status of patient: Intensive Outpatient Program (IOP)    Date: 6/7/2018    Group Focus: Psychodynamic Group Psycotherapy    Length of service: 22162 - 45-50 minutes    Number of patients in attendance: 9    Referred by: Addictive Behavior Unit Treatment Team    Target symptoms: alcohol abuse    Patient's response to treatment: Active Listening and Self-disclosure    Progress toward goals: Progressing adequately    Interval History:  Shared her story with new group member. Discussed importance of working AA program.    Diagnosis: alcohol use disorder, severe, dependence    Plan: Continue treatment on ABU

## 2018-06-07 NOTE — PROGRESS NOTES
"Group Psychotherapy (PhD/LCSW)    Site: [facility]    Clinical status of patient: Intensive Outpatient Program (IOP)    Date: 6/7/2018    Group Focus: Steps to Recovery     Length of service: 76715 - 45-50 minutes    Number of patients in attendance: 9    Referred by: Addictive Behavior Unit Treatment Team    Target symptoms: alcohol abuse    Patient's response to treatment: Active Listening and Self-disclosure    Progress toward goals: Progressing adequately    Interval History:  Discussed what people have learned from previous relapses. Noted the role of "pride" as a factor in relapse. Pt stated she felt that pride got in the way of her having a sponsor and working the steps.     Diagnosis: alcohol use disorder, severe, dependence    Plan: Continue treatment on ABU        "

## 2018-06-08 ENCOUNTER — HOSPITAL ENCOUNTER (OUTPATIENT)
Dept: PSYCHIATRY | Facility: HOSPITAL | Age: 68
Discharge: HOME | End: 2018-06-08
Attending: PSYCHIATRY & NEUROLOGY
Payer: COMMERCIAL

## 2018-06-08 VITALS — DIASTOLIC BLOOD PRESSURE: 71 MMHG | RESPIRATION RATE: 16 BRPM | HEART RATE: 65 BPM | SYSTOLIC BLOOD PRESSURE: 115 MMHG

## 2018-06-08 DIAGNOSIS — F10.20 ALCOHOL USE DISORDER, SEVERE, DEPENDENCE: Primary | ICD-10-CM

## 2018-06-08 LAB
AMPHET+METHAMPHET UR QL: NEGATIVE
BARBITURATES UR QL SCN>200 NG/ML: NEGATIVE
BENZODIAZ UR QL SCN>200 NG/ML: NEGATIVE
BREATH ALCOHOL: 0
BZE UR QL SCN: NEGATIVE
CANNABINOIDS UR QL SCN: NEGATIVE
CREAT UR-MCNC: 105 MG/DL
ETHANOL UR-MCNC: <10 MG/DL
METHADONE UR QL SCN>300 NG/ML: NEGATIVE
OPIATES UR QL SCN: NEGATIVE
PCP UR QL SCN>25 NG/ML: NEGATIVE
TOXICOLOGY INFORMATION: NORMAL

## 2018-06-08 PROCEDURE — 90853 GROUP PSYCHOTHERAPY: CPT | Performed by: SOCIAL WORKER

## 2018-06-08 PROCEDURE — 80307 DRUG TEST PRSMV CHEM ANLYZR: CPT

## 2018-06-08 PROCEDURE — 90853 GROUP PSYCHOTHERAPY: CPT | Mod: ,,, | Performed by: PSYCHOLOGIST

## 2018-06-08 PROCEDURE — 90853 GROUP PSYCHOTHERAPY: CPT

## 2018-06-08 PROCEDURE — 99232 SBSQ HOSP IP/OBS MODERATE 35: CPT | Mod: ,,, | Performed by: PSYCHIATRY & NEUROLOGY

## 2018-06-08 NOTE — PROGRESS NOTES
Group Psychotherapy (PhD/LCSW)    Site: [facility]    Clinical status of patient: Intensive Outpatient Program (IOP)    Date: 06/01/2018    Group Focus: Stress Management    Length of service: 58449 - 45-50 minutes    Number of patients in attendance: 15    Referred by: Addiction Behavioral Unit Treatment Team    Target symptoms: Anxiety, Depression    Patient's response to treatment: Active Listening    Progress toward goals: Progressing adequately    Interval History: Group learned mindfulness techniques (breath, visual imagery, sound) to improve present-moment awareness, impulsive behavior tendencies, and tolerance of various emotional states.    Diagnosis: Alcohol Use Disorder    Plan: Continue treatment on ABU

## 2018-06-08 NOTE — PROGRESS NOTES
Group Psychotherapy (PhD/LCSW)    Site: [facility]    Clinical status of patient: Intensive Outpatient Program (IOP)    Date: 6/8/2018    Group Focus: Psychodynamic Group Psycotherapy    Length of service: 12827 - 45-50 minutes    Number of patients in attendance: 11    Referred by: Addictive Behavior Unit Treatment Team    Target symptoms: alcohol abuse    Patient's response to treatment: Active Listening and Self-disclosure    Progress toward goals: Progressing adequately    Interval History:  Shared her story with new group member. Pt noted that she got a sponsor at last night's meeting.     Diagnosis: alcohol use disorder, severe, dependence    Plan: Continue treatment on ABU

## 2018-06-08 NOTE — PLAN OF CARE
06/08/18 1000   Activity/Group Therapy Checklist   Group Relapse Prevention   Attendance Attended   Follows Direction Followed directions   Group Interactions/Observations Interacted appropriately   Affect/Mood Range Blunted/flat

## 2018-06-08 NOTE — PROGRESS NOTES
2018 11:28 AM  Name: Martha Garner  : 1950  Start Date: 18    [facility] Intensive Outpatient Program   Progress Note    Status: Intensive Outpatient Program (IOP)    HPI:  Pt admitted on 18 with alcohol use disorder, opiate use disorder, and bipolar I disorder. Pt had relapse with alcohol use about 2 months prior, reports that she has been abusing her prescribed tramadol since it was started. Pt also struggling with mood symptoms, has been both depressed by her reports and irritable by family reports. Please see H&P dated 18 for full details.     SUBJECTIVE:     Interval Hx:  The patient notes that her mood is improved from Wednesday. She denies any problems with maintaining sobriety. She notes that she obtained a sponsor yesterday. She notes she is mildly apprehensive about this weekend as she has limited plans. We discuss filling her weekend with 12-step activities. She notes that she is tolerating her medications well without side effects. We plan to continue her other medications at this time and plan to adjust medications according to clinical status and side effect profile.     Medication Side Effects: None  Cravings: yes  No other acute psychiatric issues reported at this time.    Scheduled Meds:   Current Outpatient Prescriptions on File Prior to Encounter   Medication Sig Dispense Refill    calcium citrate (CALCITRATE) 200 mg (950 mg) tablet Take 3 tablets (600 mg total) by mouth 3 (three) times daily with meals. 90 tablet 0    clonazePAM (KLONOPIN) 1 MG tablet Take 0.5-1 tablets (0.5-1 mg total) by mouth nightly as needed for Anxiety. 30 tablet 0    escitalopram oxalate (LEXAPRO) 20 MG tablet Take 1 tablet (20 mg total) by mouth once daily. 30 tablet 0    escitalopram oxalate (LEXAPRO) 20 MG tablet Take 1 tablet (20 mg total) by mouth once daily. 30 tablet 0    gabapentin (NEURONTIN) 100 MG capsule Take one each morning and one at noon 60 capsule 1    gabapentin (NEURONTIN) 300  "MG capsule Take 1 capsule (300 mg total) by mouth 3 (three) times daily. 90 capsule 0    naltrexone (DEPADE) 50 mg tablet Take 1 tablet (50 mg total) by mouth once daily. 30 tablet 0    OLANZapine (ZYPREXA) 10 MG tablet TAKE ONE TABLET BY MOUTH EVERY EVENING 30 tablet 3    olanzapine (ZYPREXA) 2.5 MG tablet Take 1-2 tablets (2.5-5 mg total) by mouth every evening. 60 tablet 3     No current facility-administered medications on file prior to encounter.      Allergies:  Patient has no known allergies.          OBJECTIVE:     Vital Signs (Most Recent):  There were no vitals filed for this visit.    Mental Status Exam:  Appearance: unremarkable, casually dressed  Behavior/Cooperation: friendly and cooperative, eye contact normal  Speech: normal tone, normal volume, non-spontaneous  Mood: "good"  Affect: mood congruent   Thought Process: goal-directed, logical  Thought Content: no delusional thought content   Orientation: person, place, situation  Memory: Grossly intact  Attention Span/Concentration: Grossly intact  Cognition: grossly intact  Insight: fair  Judgment: good    Laboratory:  Recent Results (from the past 168 hour(s))   Toxicology screen, urine    Collection Time: 06/04/18 11:52 AM   Result Value Ref Range    Alcohol, Urine <10 <10 mg/dL    Benzodiazepines Negative     Methadone metabolites Negative     Cocaine (Metab.) Negative     Opiate Scrn, Ur Negative     Barbiturate Screen, Ur Negative     Amphetamine Screen, Ur Negative     THC Negative     Phencyclidine Negative     Creatinine, Random Ur 156.0 15.0 - 325.0 mg/dL    Toxicology Information SEE COMMENT    POCT BREATH ALCOHOL TEST    Collection Time: 06/04/18 11:53 AM   Result Value Ref Range    Breath Alcohol 0.00    POCT BREATH ALCOHOL TEST    Collection Time: 06/05/18 11:20 AM   Result Value Ref Range    Breath Alcohol 0.00    Toxicology screen, urine    Collection Time: 06/06/18 11:47 AM   Result Value Ref Range    Alcohol, Urine <10 <10 mg/dL    " Benzodiazepines Negative     Methadone metabolites Negative     Cocaine (Metab.) Negative     Opiate Scrn, Ur Negative     Barbiturate Screen, Ur Negative     Amphetamine Screen, Ur Negative     THC Negative     Phencyclidine Negative     Creatinine, Random Ur 69.0 15.0 - 325.0 mg/dL    Toxicology Information SEE COMMENT    POCT BREATH ALCOHOL TEST    Collection Time: 06/06/18 11:47 AM   Result Value Ref Range    Breath Alcohol 0.000    POCT BREATH ALCOHOL TEST    Collection Time: 06/07/18 11:47 AM   Result Value Ref Range    Breath Alcohol 0.000      ASSESSMENT:     Alcohol Use Disorder, severe  Opiate Use Disorder, moderate to severe   Bipolar I Disorder, MRE depressed     PLAN:     · Continue patient on ABU protocol.  · Breathalyzer daily and urine toxicology three times a week.   · Patient counseled on abstinence from alcohol, tramadol     Medications:   · Will continue with zyprexa 10mg qhs, Klonopin 0.5mg qhs, and Lexapro 20 mg daily for management of bipolar disorder  · Continue with gabapentin 100mg qam and noon, and 300mg qhs  · Continue Naltrexone 50mg daily for alcohol cravings     Status: Continue treatment on ABU    Patient's Intervention Response: accepting      Savage Pimentel MD   [facility]  Psych Resident

## 2018-06-08 NOTE — PLAN OF CARE
06/08/18 1400   Activity/Group Therapy Checklist   Group Relapse Prevention  (Step Work )   Attendance Attended   Follows Direction Followed directions   Group Interactions/Observations Interacted appropriately   Affect/Mood Range Normal range   Affect/Mood Display Appropriate   Goal Progression Progressing

## 2018-06-11 ENCOUNTER — HOSPITAL ENCOUNTER (OUTPATIENT)
Dept: PSYCHIATRY | Facility: HOSPITAL | Age: 68
Discharge: HOME | End: 2018-06-11
Attending: PSYCHIATRY & NEUROLOGY
Payer: COMMERCIAL

## 2018-06-11 VITALS — SYSTOLIC BLOOD PRESSURE: 132 MMHG | HEART RATE: 57 BPM | DIASTOLIC BLOOD PRESSURE: 71 MMHG | RESPIRATION RATE: 16 BRPM

## 2018-06-11 DIAGNOSIS — F10.20 ALCOHOL USE DISORDER, SEVERE, DEPENDENCE: Primary | ICD-10-CM

## 2018-06-11 PROCEDURE — 90853 GROUP PSYCHOTHERAPY: CPT | Performed by: SOCIAL WORKER

## 2018-06-11 PROCEDURE — 80307 DRUG TEST PRSMV CHEM ANLYZR: CPT

## 2018-06-11 PROCEDURE — 90853 GROUP PSYCHOTHERAPY: CPT | Mod: 59,,, | Performed by: PSYCHOLOGIST

## 2018-06-11 PROCEDURE — 90853 GROUP PSYCHOTHERAPY: CPT

## 2018-06-11 NOTE — PLAN OF CARE
06/11/18 1000   Activity/Group Therapy Checklist   Group Educational  (family roles )   Attendance Attended   Follows Direction Followed directions   Group Interactions/Observations Interacted appropriately   Affect/Mood Range Blunted/flat

## 2018-06-11 NOTE — PROGRESS NOTES
Group Psychotherapy (PhD/LCSW)    Site: [facility]    Clinical status of patient: Intensive Outpatient Program (IOP)    Date: 6/11/2018    Group Focus: Communication Skills       Length of service: 71413 - 45-50 minutes    Number of patients in attendance: 14    Referred by: Addictive Behavior Unit Treatment Team    Target symptoms: alcohol abuse    Patient's response to treatment: Active Listening     Progress toward goals: Progressing adequately    Interval History:  Discussed basic communication skills (I-messages; Reflective Listening; Non-verbal aspects). Illustrated their use by vignettes, modeling, and role play.     Diagnosis: alcohol use disorder, severe, dependence    Plan: Continue treatment on ABU

## 2018-06-12 ENCOUNTER — HOSPITAL ENCOUNTER (OUTPATIENT)
Dept: PSYCHIATRY | Facility: HOSPITAL | Age: 68
Discharge: HOME | End: 2018-06-12
Attending: PSYCHIATRY & NEUROLOGY
Payer: COMMERCIAL

## 2018-06-12 DIAGNOSIS — F10.20 ALCOHOL USE DISORDER, SEVERE, DEPENDENCE: Primary | ICD-10-CM

## 2018-06-12 LAB — BREATH ALCOHOL: 0

## 2018-06-12 PROCEDURE — 90853 GROUP PSYCHOTHERAPY: CPT | Mod: ,,, | Performed by: PSYCHOLOGIST

## 2018-06-12 PROCEDURE — 90853 GROUP PSYCHOTHERAPY: CPT

## 2018-06-12 PROCEDURE — 99232 SBSQ HOSP IP/OBS MODERATE 35: CPT | Mod: ,,, | Performed by: PSYCHIATRY & NEUROLOGY

## 2018-06-12 PROCEDURE — 90853 GROUP PSYCHOTHERAPY: CPT | Performed by: SOCIAL WORKER

## 2018-06-12 NOTE — PROGRESS NOTES
Group Psychotherapy (PhD/LCSW)    Site: [facility]    Clinical status of patient: Intensive Outpatient Program (IOP)    Date: 6/11/2018    Group Focus: Psychodynamic Group Psycotherapy    Length of service: 36096 - 45-50 minutes    Number of patients in attendance: 11    Referred by: Addictive Behavior Unit Treatment Team    Target symptoms: alcohol abuse    Patient's response to treatment: Active Listening and Self-disclosure    Progress toward goals: Progressing adequately    Interval History:  Shared her story with new group member. Pt discussed the way pride was a factor in her relapse.     Diagnosis: alcohol use disorder, severe, dependence    Plan: Continue treatment on ABU

## 2018-06-12 NOTE — PROGRESS NOTES
Group Psychotherapy (PhD/LCSW)    Site: [facility]    Clinical status of patient: Intensive Outpatient Program (IOP)    Date: 6/12/2018    Group Focus: CBT Group Therapy    Length of service: 88373 - 45-50 minutes    Number of patients in attendance: 17    Referred by: Addictive Behavior Unit Treatment Team    Target symptoms: Alcohol Abuse    Patient's response to treatment: Active Listening; Self-disclosure     Progress toward goals: Progressing adequately    Interval History: Session focus was Sleep Hygiene.  Patients were provided with sleep hygiene strategies and instructions for calm breathing, setting up a bedtime routine, and having a thinking/worry hour in order to facilitate efficient and restful sleep.    Diagnosis: Alcohol Use d/o, severe dependence     Plan: Continue treatment on ABU

## 2018-06-12 NOTE — PROGRESS NOTES
Group Psychotherapy (PhD/LCSW)    Site: [facility]    Clinical status of patient: Intensive Outpatient Program (IOP)    Date: 6/12/2018    Group Focus: Disease Model of Addiction    Length of service: 44283 - 45-50 minutes    Number of patients in attendance: 13    Referred by: Addictive Behavior Unit Treatment Team    Target symptoms: alcohol abuse    Patient's response to treatment: Active Listening      Progress toward goals: Progressing adequately    Interval History:  Discussed the basic neuropsychological concepts of the Disease Model of addiction and their relevance to the phenomena of addiction (powerlessness; euphoric recall; cravings; relapse; tolerance; etc). Noted the way the Disease concept comports with 12-step recovery principles and practices.. Highlighted the value of understanding the Disease Concept for sustaining long-term sobriety.    Diagnosis: alcohol use disorder, severe, dependence    Plan: Continue treatment on ABU

## 2018-06-12 NOTE — PROGRESS NOTES
Group Psychotherapy (PhD/LCSW)    Site: [facility]    Clinical status of patient: Intensive Outpatient Program (IOP)    Date: 6/12/2018    Group Focus: Psychodynamic Group Psycotherapy    Length of service: 93286 - 45-50 minutes    Number of patients in attendance: 12    Referred by: Addictive Behavior Unit Treatment Team    Target symptoms: alcohol abuse    Patient's response to treatment: Active Listening and Self-disclosure    Progress toward goals: Progressing adequately    Interval History:  Shared her story with new group member.     Diagnosis: alcohol use disorder, severe, dependence    Plan: Continue treatment on ABU

## 2018-06-12 NOTE — PLAN OF CARE
06/12/18 1400   Activity/Group Therapy Checklist   Group Goals/Reflection  (life story )   Attendance Attended   Follows Direction Followed directions   Group Interactions/Observations Interacted appropriately   Affect/Mood Range Normal range   Affect/Mood Display Appropriate   Goal Progression Progressing

## 2018-06-12 NOTE — PROGRESS NOTES
"2018 11:28 AM  Name: Martha Garner  : 1950  Start Date: 18    [facility] Intensive Outpatient Program   Progress Note    Status: Intensive Outpatient Program (IOP)    HPI:  Pt admitted on 18 with alcohol use disorder, opiate use disorder, and bipolar I disorder. Pt had relapse with alcohol use about 2 months prior, reports that she has been abusing her prescribed tramadol since it was started. Pt also struggling with mood symptoms, has been both depressed by her reports and irritable by family reports. Please see H&P dated 18 for full details.     SUBJECTIVE:     Interval Hx:  The patient notes that her mood is "good." She notes that she was able to maintain her sobriety. She notes that she was able to make it through the weekend without relapse. She attended multiple AA meetings and filled her time with recovery related activities. She denies medication side effect, we plan to continue her current medication regimen. Medications are adjusted according to clinical status and side effect profile. No medication changes today.    Medication Side Effects: None  Cravings: yes  No other acute psychiatric issues reported at this time.    Scheduled Meds:   Current Outpatient Prescriptions on File Prior to Encounter   Medication Sig Dispense Refill    calcium citrate (CALCITRATE) 200 mg (950 mg) tablet Take 3 tablets (600 mg total) by mouth 3 (three) times daily with meals. 90 tablet 0    clonazePAM (KLONOPIN) 1 MG tablet Take 0.5-1 tablets (0.5-1 mg total) by mouth nightly as needed for Anxiety. 30 tablet 0    escitalopram oxalate (LEXAPRO) 20 MG tablet Take 1 tablet (20 mg total) by mouth once daily. 30 tablet 0    escitalopram oxalate (LEXAPRO) 20 MG tablet Take 1 tablet (20 mg total) by mouth once daily. 30 tablet 0    gabapentin (NEURONTIN) 100 MG capsule Take one each morning and one at noon 60 capsule 1    gabapentin (NEURONTIN) 300 MG capsule Take 1 capsule (300 mg total) by mouth 3 " "(three) times daily. 90 capsule 0    naltrexone (DEPADE) 50 mg tablet Take 1 tablet (50 mg total) by mouth once daily. 30 tablet 0    OLANZapine (ZYPREXA) 10 MG tablet TAKE ONE TABLET BY MOUTH EVERY EVENING 30 tablet 3    olanzapine (ZYPREXA) 2.5 MG tablet Take 1-2 tablets (2.5-5 mg total) by mouth every evening. 60 tablet 3     No current facility-administered medications on file prior to encounter.      Allergies:  Patient has no known allergies.    OBJECTIVE:     Vital Signs (Most Recent):  There were no vitals filed for this visit.    Mental Status Exam:  Appearance: unremarkable, casually dressed  Behavior/Cooperation: friendly and cooperative, eye contact normal  Speech: normal tone, normal volume, non-spontaneous  Mood: "good"  Affect: mood congruent   Thought Process: goal-directed, logical  Thought Content: no delusional thought content   Orientation: person, place, situation  Memory: Grossly intact  Attention Span/Concentration: Grossly intact  Cognition: grossly intact  Insight: fair  Judgment: good    Laboratory:  Recent Results (from the past 168 hour(s))   POCT BREATH ALCOHOL TEST    Collection Time: 06/05/18 11:20 AM   Result Value Ref Range    Breath Alcohol 0.00    Toxicology screen, urine    Collection Time: 06/06/18 11:47 AM   Result Value Ref Range    Alcohol, Urine <10 <10 mg/dL    Benzodiazepines Negative     Methadone metabolites Negative     Cocaine (Metab.) Negative     Opiate Scrn, Ur Negative     Barbiturate Screen, Ur Negative     Amphetamine Screen, Ur Negative     THC Negative     Phencyclidine Negative     Creatinine, Random Ur 69.0 15.0 - 325.0 mg/dL    Toxicology Information SEE COMMENT    POCT BREATH ALCOHOL TEST    Collection Time: 06/06/18 11:47 AM   Result Value Ref Range    Breath Alcohol 0.000    POCT BREATH ALCOHOL TEST    Collection Time: 06/07/18 11:47 AM   Result Value Ref Range    Breath Alcohol 0.000    Toxicology screen, urine    Collection Time: 06/08/18 11:32 AM "   Result Value Ref Range    Alcohol, Urine <10 <10 mg/dL    Benzodiazepines Negative     Methadone metabolites Negative     Cocaine (Metab.) Negative     Opiate Scrn, Ur Negative     Barbiturate Screen, Ur Negative     Amphetamine Screen, Ur Negative     THC Negative     Phencyclidine Negative     Creatinine, Random Ur 105.0 15.0 - 325.0 mg/dL    Toxicology Information SEE COMMENT    POCT BREATH ALCOHOL TEST    Collection Time: 06/08/18 11:34 AM   Result Value Ref Range    Breath Alcohol 0.000    Toxicology screen, urine    Collection Time: 06/11/18 12:42 PM   Result Value Ref Range    Alcohol, Urine <10 <10 mg/dL    Benzodiazepines Negative     Methadone metabolites Negative     Cocaine (Metab.) Negative     Opiate Scrn, Ur Negative     Barbiturate Screen, Ur Negative     Amphetamine Screen, Ur Negative     THC Negative     Phencyclidine Negative     Creatinine, Random Ur 77.0 15.0 - 325.0 mg/dL    Toxicology Information SEE COMMENT    POCT BREATH ALCOHOL TEST    Collection Time: 06/11/18 12:46 PM   Result Value Ref Range    Breath Alcohol 0.000      ASSESSMENT:     Alcohol Use Disorder, severe  Opiate Use Disorder, moderate to severe   Bipolar I Disorder, MRE depressed     PLAN:     · Continue patient on ABU protocol.  · Breathalyzer daily and urine toxicology three times a week.   · Patient counseled on abstinence from alcohol, tramadol     Medications:   · Will continue with zyprexa 10mg qhs, Klonopin 0.5mg qhs, and Lexapro 20 mg daily for management of bipolar disorder  · Continue with gabapentin 100mg qam and noon, and 300mg qhs  · Continue Naltrexone 50mg daily for alcohol cravings     Status: Continue treatment on ABU    Patient's Intervention Response: accepting      Savage Pimentel MD   [facility]  Psych Resident

## 2018-06-13 ENCOUNTER — HOSPITAL ENCOUNTER (OUTPATIENT)
Dept: PSYCHIATRY | Facility: HOSPITAL | Age: 68
Discharge: HOME | End: 2018-06-13
Attending: PSYCHIATRY & NEUROLOGY
Payer: COMMERCIAL

## 2018-06-13 VITALS — HEART RATE: 57 BPM | RESPIRATION RATE: 16 BRPM | SYSTOLIC BLOOD PRESSURE: 109 MMHG | DIASTOLIC BLOOD PRESSURE: 62 MMHG

## 2018-06-13 DIAGNOSIS — F10.20 ALCOHOL USE DISORDER, SEVERE, DEPENDENCE: Primary | ICD-10-CM

## 2018-06-13 LAB
AMPHET+METHAMPHET UR QL: NEGATIVE
BARBITURATES UR QL SCN>200 NG/ML: NEGATIVE
BENZODIAZ UR QL SCN>200 NG/ML: NEGATIVE
BREATH ALCOHOL: 0
BZE UR QL SCN: NEGATIVE
CANNABINOIDS UR QL SCN: NEGATIVE
CREAT UR-MCNC: 44 MG/DL
ETHANOL UR-MCNC: <10 MG/DL
METHADONE UR QL SCN>300 NG/ML: NEGATIVE
OPIATES UR QL SCN: NEGATIVE
PCP UR QL SCN>25 NG/ML: NEGATIVE
TOXICOLOGY INFORMATION: NORMAL

## 2018-06-13 PROCEDURE — 90853 GROUP PSYCHOTHERAPY: CPT | Mod: ,,, | Performed by: PSYCHOLOGIST

## 2018-06-13 PROCEDURE — 99232 SBSQ HOSP IP/OBS MODERATE 35: CPT | Mod: ,,, | Performed by: PSYCHIATRY & NEUROLOGY

## 2018-06-13 PROCEDURE — 80307 DRUG TEST PRSMV CHEM ANLYZR: CPT

## 2018-06-13 PROCEDURE — 90853 GROUP PSYCHOTHERAPY: CPT | Mod: ,,, | Performed by: PSYCHIATRY & NEUROLOGY

## 2018-06-13 PROCEDURE — 90853 GROUP PSYCHOTHERAPY: CPT

## 2018-06-13 PROCEDURE — 90853 GROUP PSYCHOTHERAPY: CPT | Performed by: SOCIAL WORKER

## 2018-06-13 RX ORDER — NALTREXONE HYDROCHLORIDE 50 MG/1
50 TABLET, FILM COATED ORAL DAILY
Qty: 30 TABLET | Refills: 2 | Status: SHIPPED | OUTPATIENT
Start: 2018-06-13 | End: 2018-07-13

## 2018-06-13 NOTE — PLAN OF CARE
06/13/18 1400   Activity/Group Therapy Checklist   Group Addiction Education   Attendance Attended   Follows Direction Followed directions   Group Interactions/Observations Interacted appropriately   Affect/Mood Range Normal range   Affect/Mood Display Appropriate   Goal Progression Progressing

## 2018-06-13 NOTE — PROGRESS NOTES
Group Psychotherapy (PhD/LCSW)    Site: [facility]    Clinical status of patient: Intensive Outpatient Program (IOP)    Date: 6/13/2018    Group Focus: Psychodynamic Group Psycotherapy    Length of service: 69561 - 45-50 minutes    Number of patients in attendance: 12    Referred by: Addictive Behavior Unit Treatment Team    Target symptoms: alcohol abuse    Patient's response to treatment: Active Listening and Self-disclosure    Progress toward goals: Progressing adequately    Interval History:  Shared her story with new group member. Discussed what convinced her she was an alcoholic.    Diagnosis: alcohol use disorder, severe, dependence    Plan: Continue treatment on ABU

## 2018-06-13 NOTE — PATIENT CARE CONFERENCE
[facility] Staffing:      Alcohol use disorder - severe  Bipolar II Disorder     1. Pt is attending all groups    2. Pt is attending all meetings  3. Pt 's has minimally supportive family  4. Pt has completed spiritual assessment    5. Pt will present life story    6. Pt will present Step One assignment    7. Pt is exploring issues related to relapse  prevention; spirituality; stress management; improved communication skills; assertiveness training; poor self-esteem; disease concepts; cross addictions; and, work related issues    8. D/C date: 6/22/18     Staff discussed pt opening up more in group. Staffing discussed pt's mood appearing to stabilize and pt requesting d/c date. Staffing discussed pt obtaining a sponsor and possibly starting pt on antabuse. Staffing discussed pt's upcoming trip and needing to finalize a tight relapse prevention plan and pt's  coming in next week for family day.    Problem: Alcohol use Disorder, Severe  Goal: Address in 12 step meetings and group and individual sessions    Objective Measure: participation in groups, self report, length of sobriety, and relapse prevention plan  Time: Prior to discharge    Progress: Pt is attending groups and sessions      Problem: Opiate Use Disorder, moderate to severe  Goal: Address in 12 step meetings and group and individual sessions    Objective Measure: participation in groups, self report, length of sobriety, and relapse prevention plan  Time: Prior to discharge    Progress: Pt is attending groups and sessions    Problem: Bipolar I Disorder, MRE depressed  Goal: Address in 12 step meetings and group and individual sessions    Objective Measure: participation in groups, self report, length of sobriety, and relapse prevention plan  Time: Prior to discharge    Progress: Pt is attending groups and sessions     Staff members present:    MD Dr. Margarito Wood MD Dr. Orgeron, MD Dr. Correa, Ph.D.  Lisa Epps, [facility],  GIANA Porter, GIANA

## 2018-06-13 NOTE — PROGRESS NOTES
"2018 11:28 AM  Name: Martha Garner  : 1950  Start Date: 18    [facility] Intensive Outpatient [facility]   Progress Note    Status: Intensive Outpatient Program (IOP)    HPI:  Pt admitted on 18 with alcohol use disorder, opiate use disorder, and bipolar I disorder. Pt had relapse with alcohol use about 2 months prior, reports that she has been abusing her prescribed tramadol since it was started. Pt also struggling with mood symptoms, has been both depressed by her reports and irritable by family reports. Please see H&P dated 18 for full details.     SUBJECTIVE:     Interval Hx:  The patient notes that she is doing "well" today and her mood is "good." She notes that she continues to maintain her sobriety. She continues to be engaged with her outside program of recovery and enjoys outside meetings. We discuss he upcoming trips and how she is at risk for relapse. She notes that she has johnathan in her current regimen of Naltrexone to assist in reducing cravings and she believes that she will not gain pleasure from drinking if she chooses to drink while on the medication. We discuss the potential of starting Antabuse for these trips. She is unsure if she wants to start this medication, but we will continue to assess in the future. Medications will be adjusted according to clinical status and side effect profile. No medication changes today.     Medication Side Effects: None  Cravings: yes  No other acute psychiatric issues reported at this time.    Scheduled Meds:   Current Outpatient Prescriptions on File Prior to Encounter   Medication Sig Dispense Refill    calcium citrate (CALCITRATE) 200 mg (950 mg) tablet Take 3 tablets (600 mg total) by mouth 3 (three) times daily with meals. 90 tablet 0    clonazePAM (KLONOPIN) 1 MG tablet Take 0.5-1 tablets (0.5-1 mg total) by mouth nightly as needed for Anxiety. 30 tablet 0    escitalopram oxalate (LEXAPRO) 20 MG tablet Take 1 tablet (20 mg total) by mouth " "once daily. 30 tablet 0    escitalopram oxalate (LEXAPRO) 20 MG tablet Take 1 tablet (20 mg total) by mouth once daily. 30 tablet 0    gabapentin (NEURONTIN) 100 MG capsule Take one each morning and one at noon 60 capsule 1    gabapentin (NEURONTIN) 300 MG capsule Take 1 capsule (300 mg total) by mouth 3 (three) times daily. 90 capsule 0    naltrexone (DEPADE) 50 mg tablet Take 1 tablet (50 mg total) by mouth once daily. 30 tablet 0    OLANZapine (ZYPREXA) 10 MG tablet TAKE ONE TABLET BY MOUTH EVERY EVENING 30 tablet 3    olanzapine (ZYPREXA) 2.5 MG tablet Take 1-2 tablets (2.5-5 mg total) by mouth every evening. 60 tablet 3     No current facility-administered medications on file prior to encounter.      Allergies:  Patient has no known allergies.          OBJECTIVE:     Vital Signs (Most Recent):  There were no vitals filed for this visit.    Mental Status Exam:  Appearance: unremarkable, casually dressed  Behavior/Cooperation: friendly and cooperative, eye contact normal  Speech: normal tone, normal volume, non-spontaneous  Mood: "good"  Affect: mood congruent   Thought Process: goal-directed, logical  Thought Content: no delusional thought content   Orientation: person, place, situation  Memory: Grossly intact  Attention Span/Concentration: Grossly intact  Cognition: grossly intact  Insight: fair  Judgment: good    Laboratory:  Recent Results (from the past 168 hour(s))   Toxicology screen, urine    Collection Time: 06/06/18 11:47 AM   Result Value Ref Range    Alcohol, Urine <10 <10 mg/dL    Benzodiazepines Negative     Methadone metabolites Negative     Cocaine (Metab.) Negative     Opiate Scrn, Ur Negative     Barbiturate Screen, Ur Negative     Amphetamine Screen, Ur Negative     THC Negative     Phencyclidine Negative     Creatinine, Random Ur 69.0 15.0 - 325.0 mg/dL    Toxicology Information SEE COMMENT    POCT BREATH ALCOHOL TEST    Collection Time: 06/06/18 11:47 AM   Result Value Ref Range    " Breath Alcohol 0.000    POCT BREATH ALCOHOL TEST    Collection Time: 06/07/18 11:47 AM   Result Value Ref Range    Breath Alcohol 0.000    Toxicology screen, urine    Collection Time: 06/08/18 11:32 AM   Result Value Ref Range    Alcohol, Urine <10 <10 mg/dL    Benzodiazepines Negative     Methadone metabolites Negative     Cocaine (Metab.) Negative     Opiate Scrn, Ur Negative     Barbiturate Screen, Ur Negative     Amphetamine Screen, Ur Negative     THC Negative     Phencyclidine Negative     Creatinine, Random Ur 105.0 15.0 - 325.0 mg/dL    Toxicology Information SEE COMMENT    POCT BREATH ALCOHOL TEST    Collection Time: 06/08/18 11:34 AM   Result Value Ref Range    Breath Alcohol 0.000    Toxicology screen, urine    Collection Time: 06/11/18 12:42 PM   Result Value Ref Range    Alcohol, Urine <10 <10 mg/dL    Benzodiazepines Negative     Methadone metabolites Negative     Cocaine (Metab.) Negative     Opiate Scrn, Ur Negative     Barbiturate Screen, Ur Negative     Amphetamine Screen, Ur Negative     THC Negative     Phencyclidine Negative     Creatinine, Random Ur 77.0 15.0 - 325.0 mg/dL    Toxicology Information SEE COMMENT    POCT BREATH ALCOHOL TEST    Collection Time: 06/11/18 12:46 PM   Result Value Ref Range    Breath Alcohol 0.000    POCT BREATH ALCOHOL TEST    Collection Time: 06/12/18 12:50 PM   Result Value Ref Range    Breath Alcohol 0.000      ASSESSMENT:     Alcohol Use Disorder, severe  Opiate Use Disorder, moderate to severe   Bipolar I Disorder, MRE depressed     PLAN:     · Continue patient on ABU protocol.  · Breathalyzer daily and urine toxicology three times a week.   · Patient counseled on abstinence from alcohol, tramadol     Medications:   · Will continue with zyprexa 10mg qhs, Klonopin 0.5mg qhs, and Lexapro 20 mg daily for management of bipolar disorder  · Continue with gabapentin 100mg qam and noon, and 300mg qhs  · Continue Naltrexone 50mg daily for alcohol cravings     Status:  Continue treatment on ABU    Patient's Intervention Response: accepting      Savage Pimentel MD   [facility]  Psych Resident

## 2018-06-13 NOTE — PROGRESS NOTES
Group Psychotherapy (PhD/LCSW)    Site: [facility]    Clinical status of patient: Intensive Outpatient Program (IOP)    Date: 6/13/2018    Group Focus: DBT-Based Group Therapy    Length of service: 46074 - 45-50 minutes    Number of patients in attendance: 15    Referred by: Addictive Behavior Unit Treatment Team    Target symptoms: Alcohol Abuse    Patient's response to treatment: Active Listening; Self-disclosure     Progress toward goals: Progressing adequately    Interval History: Session focus was Distress Tolerance:  STOP and Self-Soothe.  Patients were encouraged to use crisis survival skills to reduce intensity of distress.  Each patient identified something soothing for all five senses.    Diagnosis: Alcohol Use d/o, severe dependence     Plan: Continue treatment on ABU

## 2018-06-14 ENCOUNTER — HOSPITAL ENCOUNTER (OUTPATIENT)
Dept: PSYCHIATRY | Facility: HOSPITAL | Age: 68
Discharge: HOME | End: 2018-06-14
Attending: PSYCHIATRY & NEUROLOGY
Payer: COMMERCIAL

## 2018-06-14 DIAGNOSIS — F10.20 ALCOHOL USE DISORDER, SEVERE, DEPENDENCE: Primary | ICD-10-CM

## 2018-06-14 PROCEDURE — 90853 GROUP PSYCHOTHERAPY: CPT | Mod: ,,, | Performed by: PSYCHOLOGIST

## 2018-06-14 PROCEDURE — 90853 GROUP PSYCHOTHERAPY: CPT | Performed by: SOCIAL WORKER

## 2018-06-14 PROCEDURE — 90853 GROUP PSYCHOTHERAPY: CPT

## 2018-06-14 NOTE — PLAN OF CARE
06/14/18 1400   Activity/Group Therapy Checklist   Group Relapse Prevention  (Step Work )   Attendance Attended   Follows Direction Followed directions   Group Interactions/Observations Interacted appropriately   Affect/Mood Range Normal range   Affect/Mood Display Appropriate   Goal Progression Progressing

## 2018-06-14 NOTE — PROGRESS NOTES
Group Psychotherapy (PhD/LCSW)    Site: [facility]    Clinical status of patient: Intensive Outpatient Program (IOP)    Date: 6/14/2018    Group Focus: Psychodynamic Group Psycotherapy    Length of service: 79786 - 45-50 minutes    Number of patients in attendance: 10    Referred by: Addictive Behavior Unit Treatment Team    Target symptoms: alcohol abuse    Patient's response to treatment: Active Listening and Self-disclosure    Progress toward goals: Progressing adequately    Interval History:  Discussed feeling somewhat overwhelmed with daily duties in addition to treatment. Discussing how to stay sober on vacation.    Diagnosis: alcohol use disorder, severe, dependence    Plan: Continue treatment on ABU

## 2018-06-14 NOTE — PROGRESS NOTES
Group Psychotherapy (PhD/LCSW)    Site: [facility]    Clinical status of patient: Intensive Outpatient Program (IOP)    Date: 6/14/2018    Group Focus: DBT-Based Group Therapy    Length of service: 50957 - 45-50 minutes    Number of patients in attendance: 13    Referred by: Addictive Behavior Unit Treatment Team    Target symptoms: Alcohol Abuse    Patient's response to treatment: Active Listening; Self-disclosure     Progress toward goals: Progressing adequately    Interval History: Session focus was Distress Tolerance:  TIP skill.  Patients were encouraged to use temperature, intense exercise, paced breathing, or paired muscle relaxation to reduce intensity of distress.    Diagnosis: Alcohol Use d/o, severe dependence     Plan: Continue treatment on ABU

## 2018-06-14 NOTE — PLAN OF CARE
06/14/18 1000   Activity/Group Therapy Checklist   Group Addiction Education   Attendance Attended   Follows Direction Followed directions   Group Interactions/Observations Interacted appropriately   Affect/Mood Range Normal range   Affect/Mood Display Appropriate   Goal Progression Progressing

## 2018-06-18 ENCOUNTER — HOSPITAL ENCOUNTER (OUTPATIENT)
Dept: PSYCHIATRY | Facility: HOSPITAL | Age: 68
Discharge: HOME | End: 2018-06-18
Attending: PSYCHIATRY & NEUROLOGY
Payer: COMMERCIAL

## 2018-06-18 VITALS — SYSTOLIC BLOOD PRESSURE: 110 MMHG | HEART RATE: 73 BPM | RESPIRATION RATE: 16 BRPM | DIASTOLIC BLOOD PRESSURE: 63 MMHG

## 2018-06-18 DIAGNOSIS — F10.20 ALCOHOL USE DISORDER, SEVERE, DEPENDENCE: Primary | ICD-10-CM

## 2018-06-18 DIAGNOSIS — F31.70 BIPOLAR DISORDER, CURRENTLY IN REMISSION, MOST RECENT EPISODE UNSPECIFIED: ICD-10-CM

## 2018-06-18 LAB
AMPHET+METHAMPHET UR QL: NEGATIVE
BARBITURATES UR QL SCN>200 NG/ML: NEGATIVE
BENZODIAZ UR QL SCN>200 NG/ML: NEGATIVE
BREATH ALCOHOL: 0
BZE UR QL SCN: NEGATIVE
CANNABINOIDS UR QL SCN: NEGATIVE
CREAT UR-MCNC: 88 MG/DL
ETHANOL UR-MCNC: <10 MG/DL
METHADONE UR QL SCN>300 NG/ML: NEGATIVE
OPIATES UR QL SCN: NEGATIVE
PCP UR QL SCN>25 NG/ML: NEGATIVE
TOXICOLOGY INFORMATION: NORMAL

## 2018-06-18 PROCEDURE — 99232 SBSQ HOSP IP/OBS MODERATE 35: CPT | Mod: ,,, | Performed by: PSYCHIATRY & NEUROLOGY

## 2018-06-18 PROCEDURE — 90853 GROUP PSYCHOTHERAPY: CPT | Performed by: SOCIAL WORKER

## 2018-06-18 PROCEDURE — 80365 DRUG SCREENING OXYCODONE: CPT

## 2018-06-18 PROCEDURE — 90853 GROUP PSYCHOTHERAPY: CPT

## 2018-06-18 PROCEDURE — 80346 BENZODIAZEPINES1-12: CPT

## 2018-06-18 PROCEDURE — 80307 DRUG TEST PRSMV CHEM ANLYZR: CPT

## 2018-06-18 PROCEDURE — 90853 GROUP PSYCHOTHERAPY: CPT | Mod: 59,,, | Performed by: PSYCHOLOGIST

## 2018-06-18 NOTE — PROGRESS NOTES
"2018 11:28 AM  Name: Martha Garner  : 1950  Start Date: 18    [facility] Intensive [facility]patient [facility]   Progress Note    Status: Intensive Outpatient Program (IOP)    HPI:  Pt admitted on 18 with alcohol use disorder, opiate use disorder, and bipolar I disorder. Pt had relapse with alcohol use about 2 months prior, reports that she has been abusing her prescribed tramadol since it was started. Pt also struggling with mood symptoms, has been both depressed by her reports and irritable by family reports. Please see H&P dated 18 for full details.     SUBJECTIVE:     States that her mood is "down" today. States her father in law has congestive heart failure which made the weekend tough. States she feels isolated socially. She denies SI passive and active. No intent or plan for suicide.     States that she is still having cravings. States that if she "goes back to drinking" that's it.    States that she has not had any relapses over the weekend and attended 2 AA meetings.      Scheduled Meds:   Current Outpatient Prescriptions on File Prior to Encounter   Medication Sig Dispense Refill    calcium citrate (CALCITRATE) 200 mg (950 mg) tablet Take 3 tablets (600 mg total) by mouth 3 (three) times daily with meals. 90 tablet 0    clonazePAM (KLONOPIN) 1 MG tablet Take 0.5-1 tablets (0.5-1 mg total) by mouth nightly as needed for Anxiety. 30 tablet 0    escitalopram oxalate (LEXAPRO) 20 MG tablet Take 1 tablet (20 mg total) by mouth once daily. 30 tablet 0    escitalopram oxalate (LEXAPRO) 20 MG tablet Take 1 tablet (20 mg total) by mouth once daily. 30 tablet 0    gabapentin (NEURONTIN) 100 MG capsule Take one each morning and one at noon 60 capsule 1    gabapentin (NEURONTIN) 300 MG capsule Take 1 capsule (300 mg total) by mouth 3 (three) times daily. 90 capsule 0    naltrexone (DEPADE) 50 mg tablet Take 1 tablet (50 mg total) by mouth once daily. 30 tablet 0    naltrexone (DEPADE) 50 mg tablet " "Take 1 tablet (50 mg total) by mouth once daily. 30 tablet 2    OLANZapine (ZYPREXA) 10 MG tablet TAKE ONE TABLET BY MOUTH EVERY EVENING 30 tablet 3    olanzapine (ZYPREXA) 2.5 MG tablet Take 1-2 tablets (2.5-5 mg total) by mouth every evening. 60 tablet 3     No current facility-administered medications on file prior to encounter.      Allergies:  Patient has no known allergies.          OBJECTIVE:     Vital Signs (Most Recent):  There were no vitals filed for this visit.    Mental Status Exam:  Appearance: unremarkable, casually dressed  Behavior/Cooperation: friendly and cooperative, eye contact normal  Speech: normal tone, normal volume, non-spontaneous  Mood: "good"  Affect: mood congruent   Thought Process: goal-directed, logical  Thought Content: no delusional thought content   Orientation: person, place, situation  Memory: Grossly intact  Attention Span/Concentration: Grossly intact  Cognition: grossly intact  Insight: fair  Judgment: good    Laboratory:  Recent Results (from the past 168 hour(s))   Toxicology screen, urine    Collection Time: 06/11/18 12:42 PM   Result Value Ref Range    Alcohol, Urine <10 <10 mg/dL    Benzodiazepines Negative     Methadone metabolites Negative     Cocaine (Metab.) Negative     Opiate Scrn, Ur Negative     Barbiturate Screen, Ur Negative     Amphetamine Screen, Ur Negative     THC Negative     Phencyclidine Negative     Creatinine, Random Ur 77.0 15.0 - 325.0 mg/dL    Toxicology Information SEE COMMENT    POCT BREATH ALCOHOL TEST    Collection Time: 06/11/18 12:46 PM   Result Value Ref Range    Breath Alcohol 0.000    POCT BREATH ALCOHOL TEST    Collection Time: 06/12/18 12:50 PM   Result Value Ref Range    Breath Alcohol 0.000    Toxicology screen, urine    Collection Time: 06/13/18  1:28 PM   Result Value Ref Range    Alcohol, Urine <10 <10 mg/dL    Benzodiazepines Negative     Methadone metabolites Negative     Cocaine (Metab.) Negative     Opiate Scrn, Ur Negative     " Barbiturate Screen, Ur Negative     Amphetamine Screen, Ur Negative     THC Negative     Phencyclidine Negative     Creatinine, Random Ur 44.0 15.0 - 325.0 mg/dL    Toxicology Information SEE COMMENT    POCT BREATH ALCOHOL TEST    Collection Time: 06/13/18  1:30 PM   Result Value Ref Range    Breath Alcohol 0.000      ASSESSMENT:     Alcohol Use Disorder, severe  Opiate Use Disorder, moderate to severe   Bipolar I Disorder, MRE depressed     PLAN:     · Continue patient on ABU protocol.  · Breathalyzer daily and urine toxicology three times a week.   · Patient counseled on abstinence from alcohol, tramadol     Medications:     · Will continue with zyprexa 10 mg qhs, Klonopin 0.5 mg qhs, and Lexapro 20 mg daily for management of bipolar disorder  · Continue with gabapentin 100 mg qam and noon, and 300mg qhs  · Continue Naltrexone 50 mg daily for alcohol cravings     Status: Continue treatment on ABU    Patient's Intervention Response: accepting

## 2018-06-18 NOTE — PLAN OF CARE
06/18/18 1000   Activity/Group Therapy Checklist   Group Music   Attendance Attended   Follows Direction Followed directions   Group Interactions/Observations Interacted appropriately   Affect/Mood Range Normal range   Affect/Mood Display Appropriate

## 2018-06-18 NOTE — PROGRESS NOTES
Group Psychotherapy (PhD/LCSW)    Site: [facility]    Clinical status of patient: Intensive Outpatient Program (IOP)    Date: 6/18/2018    Group Focus:Communication Skills     Length of service: 10200 - 45-50 minutes    Number of patients in attendance: 16    Referred by: Addictive Behavior Unit Treatment Team    Target symptoms: alcohol abuse    Patient's response to treatment: Active Listening      Progress toward goals: Progressing adequately    Interval History:  Discussed the ways differences in gender, fx of origin background, ethnicity, etc can impair the communication process when not taken into account. Described the way I-messages and Reflective Listening can help overcome these obstacles.      Diagnosis: alcohol use disorder, severe, dependence    Plan: Continue treatment on ABU

## 2018-06-18 NOTE — PLAN OF CARE
06/18/18 1400   Activity/Group Therapy Checklist   Group Educational   Attendance Attended   Follows Direction Followed directions   Group Interactions/Observations Interacted appropriately   Affect/Mood Range Normal range   Affect/Mood Display Appropriate   Goal Progression Progressing

## 2018-06-18 NOTE — PROGRESS NOTES
Group Psychotherapy (PhD/LCSW)    Site: [facility]    Clinical status of patient: Intensive Outpatient Program (IOP)    Date: 6/18/2018    Group Focus: Psychodynamic Group Psycotherapy    Length of service: 37485 - 45-50 minutes    Number of patients in attendance: 13    Referred by: Addictive Behavior Unit Treatment Team    Target symptoms: alcohol abuse    Patient's response to treatment: Active Listening and Self-disclosure    Progress toward goals: Progressing adequately    Interval History:  Pt shared her story with a new group member.     Diagnosis: alcohol use disorder, severe, dependence    Plan: Continue treatment on ABU

## 2018-06-19 ENCOUNTER — HOSPITAL ENCOUNTER (OUTPATIENT)
Dept: PSYCHIATRY | Facility: HOSPITAL | Age: 68
Discharge: HOME | End: 2018-06-19
Attending: PSYCHIATRY & NEUROLOGY
Payer: COMMERCIAL

## 2018-06-19 DIAGNOSIS — F10.20 ALCOHOL USE DISORDER, SEVERE, DEPENDENCE: Primary | ICD-10-CM

## 2018-06-19 LAB — BREATH ALCOHOL: 0

## 2018-06-19 PROCEDURE — 90853 GROUP PSYCHOTHERAPY: CPT | Mod: ,,, | Performed by: PSYCHOLOGIST

## 2018-06-19 PROCEDURE — 99232 SBSQ HOSP IP/OBS MODERATE 35: CPT | Mod: ,,, | Performed by: PSYCHIATRY & NEUROLOGY

## 2018-06-19 PROCEDURE — 90853 GROUP PSYCHOTHERAPY: CPT | Performed by: SOCIAL WORKER

## 2018-06-19 PROCEDURE — 90853 GROUP PSYCHOTHERAPY: CPT

## 2018-06-19 NOTE — PROGRESS NOTES
Group Psychotherapy (PhD/LCSW)    Site: [facility]    Clinical status of patient: Intensive Outpatient Program (IOP)    Date: 6/19/2018    Group Focus: Disease Model of Addiction      Length of service: 14684 - 45-50 minutes    Number of patients in attendance: 12    Referred by: Addictive Behavior Unit Treatment Team    Target symptoms: Alcohol Abuse    Patient's response to treatment: Active Listening     Progress toward goals: Progressing adequately    Interval History:  Discussed the basic neuropsychological concepts of the Disease Model of Addiction and how they relate to the phenomena of addiction (eg, powerlessness, euphoric recall, cravings, relapse, etc). Noted the way the Disease Concept comports with 12-step practices and its value for sustaining long-term sobriety.     Diagnosis: Alcohol Use d/o, severe dependence     Plan: Continue treatment on ABU

## 2018-06-19 NOTE — PROGRESS NOTES
Group Psychotherapy (PhD/LCSW)    Site: [facility]    Clinical status of patient: Intensive Outpatient Program (IOP)    Date: 6/19/2018    Group Focus: Psychodynamic Group Psycotherapy    Length of service: 44870 - 45-50 minutes    Number of patients in attendance: 12    Referred by: Addictive Behavior Unit Treatment Team    Target symptoms: alcohol abuse    Patient's response to treatment: Active Listening and Self-disclosure    Progress toward goals: Progressing adequately    Interval History:  Discussed feeling sad and upset that her daughter will not speak with her.    Diagnosis: alcohol use disorder, severe, dependence    Plan: Continue treatment on ABU

## 2018-06-19 NOTE — PROGRESS NOTES
Group Psychotherapy (PhD/LCSW)    Site: [facility]    Clinical status of patient: Intensive Outpatient Program (IOP)    Date: 6/19/2018    Group Focus: DBT-Based Group Therapy    Length of service: 51253 - 45-50 minutes    Number of patients in attendance: 16    Referred by: Addictive Behavior Unit Treatment Team    Target symptoms: Alcohol Abuse    Patient's response to treatment: Active Listening; Self-disclosure     Progress toward goals: Progressing adequately    Interval History:  Session focus was Distress Tolerance:  Distract with ACCEPTS.  Patients were encouraged to use activities, contributing, comparisons, different emotions, pushing away, other thoughts, and sensations to reduce intensity of distress.  Each patient identified unique ways to use ACCEPTS.    Diagnosis: Alcohol Use d/o, severe dependence     Plan: Continue treatment on ABU

## 2018-06-19 NOTE — PLAN OF CARE
06/19/18 1400   Activity/Group Therapy Checklist   Group Other (Comments)  (family group )   Attendance Attended   Follows Direction Followed directions   Group Interactions/Observations Interacted appropriately   Affect/Mood Range Normal range   Affect/Mood Display Appropriate   Goal Progression Progressing

## 2018-06-19 NOTE — PROGRESS NOTES
"2018 11:28 AM  Name: Martha Garner  : 1950  Start Date: 18    [facility] Intensive Outpatient Program   Progress Note    Status: Intensive Outpatient Program (IOP)    HPI:  Pt admitted on 18 with alcohol use disorder, opiate use disorder, and bipolar I disorder. Pt had relapse with alcohol use about 2 months prior, reports that she has been abusing her prescribed tramadol since it was started. Pt also struggling with mood symptoms, has been both depressed by her reports and irritable by family reports. Please see H&P dated 18 for full details.     SUBJECTIVE:     Patient seen today. States her mood is "depressed" still and has not improved at all. States she did not sleep well last night. Endorses low energy, poor concentration. States her appetite is not affected. She denies Si. States her depression makes her want to drink so that her "mood will be altered."     Patient is refusing medication adjustments or starting new medications at this time.         Scheduled Meds:   Current Outpatient Prescriptions on File Prior to Encounter   Medication Sig Dispense Refill    calcium citrate (CALCITRATE) 200 mg (950 mg) tablet Take 3 tablets (600 mg total) by mouth 3 (three) times daily with meals. 90 tablet 0    clonazePAM (KLONOPIN) 1 MG tablet Take 0.5-1 tablets (0.5-1 mg total) by mouth nightly as needed for Anxiety. 30 tablet 0    escitalopram oxalate (LEXAPRO) 20 MG tablet Take 1 tablet (20 mg total) by mouth once daily. 30 tablet 0    escitalopram oxalate (LEXAPRO) 20 MG tablet Take 1 tablet (20 mg total) by mouth once daily. 30 tablet 0    gabapentin (NEURONTIN) 100 MG capsule Take one each morning and one at noon 60 capsule 1    gabapentin (NEURONTIN) 300 MG capsule Take 1 capsule (300 mg total) by mouth 3 (three) times daily. 90 capsule 0    naltrexone (DEPADE) 50 mg tablet Take 1 tablet (50 mg total) by mouth once daily. 30 tablet 0    naltrexone (DEPADE) 50 mg tablet Take 1 tablet " "(50 mg total) by mouth once daily. 30 tablet 2    OLANZapine (ZYPREXA) 10 MG tablet TAKE ONE TABLET BY MOUTH EVERY EVENING 30 tablet 3    olanzapine (ZYPREXA) 2.5 MG tablet Take 1-2 tablets (2.5-5 mg total) by mouth every evening. 60 tablet 3     No current facility-administered medications on file prior to encounter.      Allergies:  Patient has no known allergies.          OBJECTIVE:     Vital Signs (Most Recent):  There were no vitals filed for this visit.    Mental Status Exam:  Appearance: unremarkable, casually dressed  Behavior/Cooperation: friendly and cooperative, eye contact normal  Speech: normal tone, normal volume, non-spontaneous  Mood: "depressed"  Affect: mood congruent   Thought Process: goal-directed, logical  Thought Content: no delusional thought content   Orientation: person, place, situation  Memory: Grossly intact  Attention Span/Concentration: Grossly intact  Cognition: grossly intact  Insight: fair  Judgment: good    Laboratory:  Recent Results (from the past 168 hour(s))   POCT BREATH ALCOHOL TEST    Collection Time: 06/12/18 12:50 PM   Result Value Ref Range    Breath Alcohol 0.000    Toxicology screen, urine    Collection Time: 06/13/18  1:28 PM   Result Value Ref Range    Alcohol, Urine <10 <10 mg/dL    Benzodiazepines Negative     Methadone metabolites Negative     Cocaine (Metab.) Negative     Opiate Scrn, Ur Negative     Barbiturate Screen, Ur Negative     Amphetamine Screen, Ur Negative     THC Negative     Phencyclidine Negative     Creatinine, Random Ur 44.0 15.0 - 325.0 mg/dL    Toxicology Information SEE COMMENT    POCT BREATH ALCOHOL TEST    Collection Time: 06/13/18  1:30 PM   Result Value Ref Range    Breath Alcohol 0.000    Toxicology screen, urine    Collection Time: 06/18/18 11:12 AM   Result Value Ref Range    Alcohol, Urine <10 <10 mg/dL    Benzodiazepines Negative     Methadone metabolites Negative     Cocaine (Metab.) Negative     Opiate Scrn, Ur Negative     " Barbiturate Screen, Ur Negative     Amphetamine Screen, Ur Negative     THC Negative     Phencyclidine Negative     Creatinine, Random Ur 88.0 15.0 - 325.0 mg/dL    Toxicology Information SEE COMMENT    POCT BREATH ALCOHOL TEST    Collection Time: 06/18/18 11:12 AM   Result Value Ref Range    Breath Alcohol 0.000    POCT BREATH ALCOHOL TEST    Collection Time: 06/19/18 10:52 AM   Result Value Ref Range    Breath Alcohol 0.000      ASSESSMENT:     Alcohol Use Disorder, severe  Opiate Use Disorder, moderate to severe   Bipolar I Disorder, MRE depressed     PLAN:     · Continue patient on ABU protocol.  · Breathalyzer daily and urine toxicology three times a week.   · Patient counseled on abstinence from alcohol, tramadol     Medications:     · Will continue with Zyprexa 10 mg qhs, Klonopin 0.5 mg qhs, and Lexapro 20 mg daily for management of bipolar disorder  · Continue with gabapentin 100 mg qam and noon, and 300mg qhs  · Continue Naltrexone 50 mg daily for alcohol cravings     Status: Continue treatment on ABU    Patient's Intervention Response: accepting

## 2018-06-20 ENCOUNTER — HOSPITAL ENCOUNTER (OUTPATIENT)
Dept: PSYCHIATRY | Facility: HOSPITAL | Age: 68
Discharge: HOME | End: 2018-06-20
Attending: PSYCHIATRY & NEUROLOGY
Payer: COMMERCIAL

## 2018-06-20 VITALS — HEART RATE: 65 BPM | DIASTOLIC BLOOD PRESSURE: 73 MMHG | RESPIRATION RATE: 16 BRPM | SYSTOLIC BLOOD PRESSURE: 124 MMHG

## 2018-06-20 DIAGNOSIS — F10.20 ALCOHOL USE DISORDER, SEVERE, DEPENDENCE: ICD-10-CM

## 2018-06-20 LAB
AMPHET+METHAMPHET UR QL: NEGATIVE
BARBITURATES UR QL SCN>200 NG/ML: NEGATIVE
BENZODIAZ UR QL SCN>200 NG/ML: NEGATIVE
BREATH ALCOHOL: 0
BZE UR QL SCN: NEGATIVE
CANNABINOIDS UR QL SCN: NEGATIVE
CREAT UR-MCNC: 89 MG/DL
ETHANOL UR-MCNC: <10 MG/DL
METHADONE UR QL SCN>300 NG/ML: NEGATIVE
OPIATES UR QL SCN: NEGATIVE
PCP UR QL SCN>25 NG/ML: NEGATIVE
TOXICOLOGY INFORMATION: NORMAL

## 2018-06-20 PROCEDURE — 90853 GROUP PSYCHOTHERAPY: CPT

## 2018-06-20 PROCEDURE — 90853 GROUP PSYCHOTHERAPY: CPT | Mod: ,,, | Performed by: PSYCHOLOGIST

## 2018-06-20 PROCEDURE — 90853 GROUP PSYCHOTHERAPY: CPT | Performed by: SOCIAL WORKER

## 2018-06-20 PROCEDURE — 90853 GROUP PSYCHOTHERAPY: CPT | Mod: ,,, | Performed by: PSYCHIATRY & NEUROLOGY

## 2018-06-20 PROCEDURE — 80307 DRUG TEST PRSMV CHEM ANLYZR: CPT

## 2018-06-20 NOTE — PATIENT CARE CONFERENCE
ABU Staffing:      Alcohol use disorder - severe  Bipolar II Disorder     1. Pt is attending all groups    2. Pt is attending all meetings  3. Pt 's has minimally supportive family  4. Pt has completed spiritual assessment    5. Pt will present life story    6. Pt will present Step One assignment    7. Pt is exploring issues related to relapse  prevention; spirituality; stress management; improved communication skills; assertiveness training; poor self-esteem; disease concepts; cross addictions; and, work related issues    8. D/C date: 6/22/18     Staff discussed pt appearing depressed after return from [place] with family and daughter refusing to speak to pt. Staffing discussed medication management for pt and pt not responding to lithium. Staffing also discussed pt's  needing to attend family day prior to d/c.    Problem: Alcohol use Disorder, Severe  Goal: Address in 12 step meetings and group and individual sessions    Objective Measure: participation in groups, self report, length of sobriety, and relapse prevention plan  Time: Prior to discharge    Progress: Pt is attending groups and sessions      Problem: Opiate Use Disorder, moderate to severe  Goal: Address in 12 step meetings and group and individual sessions    Objective Measure: participation in groups, self report, length of sobriety, and relapse prevention plan  Time: Prior to discharge    Progress: Pt is attending groups and sessions    Problem: Bipolar I Disorder, MRE depressed  Goal: Address in 12 step meetings and group and individual sessions    Objective Measure: participation in groups, self report, length of sobriety, and relapse prevention plan  Time: Prior to discharge    Progress: Pt is attending groups and sessions     Staff members present:    MD Dr. Daniel Wood MD Dr. Correa, Ph.D.  Lisa Epps, [facility]  Jean-Paul Porter, [facility]  Tamika Anders,  Intern

## 2018-06-20 NOTE — PLAN OF CARE
06/20/18 1400   Activity/Group Therapy Checklist   Group Relapse Prevention   Attendance Attended   Follows Direction Followed directions   Group Interactions/Observations Interacted appropriately   Affect/Mood Range Normal range   Affect/Mood Display Appropriate   Goal Progression Progressing

## 2018-06-20 NOTE — PROGRESS NOTES
Group Psychotherapy (PhD/LCSW)    Site: [facility]    Clinical status of patient: Intensive Outpatient Program (IOP)    Date: 6/20/2018    Group Focus: DBT-Based Group Therapy    Length of service: 52411 - 45-50 minutes    Number of patients in attendance: 16    Referred by: Addictive Behavior Unit Treatment Team    Target symptoms: Alcohol Abuse    Patient's response to treatment: Active Listening; Self-disclosure     Progress toward goals: Progressing adequately    Interval History:  Session focus was Interpersonal Effectiveness:  Validation.  Patients were encouraged to focus on validation of others by enhancing their ability to hear, understand, and respect others.    Diagnosis: Alcohol Use d/o, severe dependence     Plan: Continue treatment on ABU

## 2018-06-21 ENCOUNTER — HOSPITAL ENCOUNTER (OUTPATIENT)
Dept: PSYCHIATRY | Facility: HOSPITAL | Age: 68
Discharge: HOME | End: 2018-06-21
Attending: PSYCHIATRY & NEUROLOGY
Payer: COMMERCIAL

## 2018-06-21 DIAGNOSIS — F10.20 ALCOHOL USE DISORDER, SEVERE, DEPENDENCE: Primary | ICD-10-CM

## 2018-06-21 LAB
BREATH ALCOHOL: 0
ETHYL GLUCURONIDE: NEGATIVE

## 2018-06-21 PROCEDURE — 90853 GROUP PSYCHOTHERAPY: CPT | Performed by: SOCIAL WORKER

## 2018-06-21 PROCEDURE — 99232 SBSQ HOSP IP/OBS MODERATE 35: CPT | Mod: ,,, | Performed by: PSYCHIATRY & NEUROLOGY

## 2018-06-21 PROCEDURE — 90853 GROUP PSYCHOTHERAPY: CPT | Mod: ,,, | Performed by: PSYCHOLOGIST

## 2018-06-21 PROCEDURE — 90853 GROUP PSYCHOTHERAPY: CPT

## 2018-06-21 RX ORDER — GABAPENTIN 300 MG/1
300 CAPSULE ORAL 3 TIMES DAILY
Qty: 90 CAPSULE | Refills: 2 | Status: SHIPPED | OUTPATIENT
Start: 2018-06-21 | End: 2018-10-30

## 2018-06-21 RX ORDER — DISULFIRAM 250 MG/1
250 TABLET ORAL DAILY
Qty: 30 TABLET | Refills: 1 | Status: SHIPPED | OUTPATIENT
Start: 2018-06-21 | End: 2018-10-30 | Stop reason: SDUPTHER

## 2018-06-21 NOTE — PROGRESS NOTES
Group Psychotherapy (PhD/LCSW)    Site: [facility]    Clinical status of patient: Intensive Outpatient Program (IOP)    Date: 6/21/2018    Group Focus: Strength Training      Length of service: 29129 - 45-50 minutes    Number of patients in attendance: 16    Referred by: Addictive Behavior Unit Treatment Team    Target symptoms: Alcohol Abuse    Patient's response to treatment: Active Listening;      Progress toward goals: Progressing adequately    Interval History:  Session focused on self-fulfilling prophecies: how to recognize them and to alter one's self-talk and behavior to avoid the dangers associated with them.     Diagnosis: Alcohol Use d/o, severe dependence     Plan: Continue treatment on ABU

## 2018-06-21 NOTE — PROGRESS NOTES
2018 11:28 AM  Name: Martha Garner  : 1950  Start Date: 18    [facility] Intensive Outpatient Program   Progress Note    Status: Intensive Outpatient Program (IOP)    HPI:  Pt admitted on 18 with alcohol use disorder, opiate use disorder, and bipolar I disorder. Pt had relapse with alcohol use about 2 months prior, reports that she has been abusing her prescribed tramadol since it was started. Pt also struggling with mood symptoms, has been both depressed by her reports and irritable by family reports. Please see H&P dated 18 for full details.     SUBJECTIVE:     Patient seen and examined today. States she made a gratitude list which helped her mood improve somewhat. She denies any relapses of alcohol use. She reports she is continuing to have occasional cravings but denies any specific triggers. She denies any urge or plan to return to alcohol use.    After discharge from the ABU she plans to go to meetings, volunteer at [employer], [employer], reading, exercising. She says she plans to possibly attend after care meetings and will go to see her therapist once per week. Patient plans to attend a 12 step program at a local [employer].  She endorses good sleep and appetite. She denies SI.        Scheduled Meds:   Current Outpatient Prescriptions on File Prior to Encounter   Medication Sig Dispense Refill    calcium citrate (CALCITRATE) 200 mg (950 mg) tablet Take 3 tablets (600 mg total) by mouth 3 (three) times daily with meals. 90 tablet 0    clonazePAM (KLONOPIN) 1 MG tablet Take 0.5-1 tablets (0.5-1 mg total) by mouth nightly as needed for Anxiety. 30 tablet 0    escitalopram oxalate (LEXAPRO) 20 MG tablet Take 1 tablet (20 mg total) by mouth once daily. 30 tablet 0    escitalopram oxalate (LEXAPRO) 20 MG tablet Take 1 tablet (20 mg total) by mouth once daily. 30 tablet 0    gabapentin (NEURONTIN) 100 MG capsule Take one each morning and one at noon 60 capsule 1    gabapentin  "(NEURONTIN) 300 MG capsule Take 1 capsule (300 mg total) by mouth 3 (three) times daily. 90 capsule 0    [] naltrexone (DEPADE) 50 mg tablet Take 1 tablet (50 mg total) by mouth once daily. 30 tablet 0    naltrexone (DEPADE) 50 mg tablet Take 1 tablet (50 mg total) by mouth once daily. 30 tablet 2    OLANZapine (ZYPREXA) 10 MG tablet TAKE ONE TABLET BY MOUTH EVERY EVENING 30 tablet 3    olanzapine (ZYPREXA) 2.5 MG tablet Take 1-2 tablets (2.5-5 mg total) by mouth every evening. 60 tablet 3     No current facility-administered medications on file prior to encounter.      Allergies:  Patient has no known allergies.          OBJECTIVE:     Vital Signs (Most Recent):  There were no vitals filed for this visit.    Mental Status Exam:  Appearance: unremarkable, casually dressed  Behavior/Cooperation: friendly and cooperative, eye contact normal  Speech: normal tone, normal volume, non-spontaneous  Mood: "better"  Affect: mood congruent   Thought Process: goal-directed, logical  Thought Content: no delusional thought content   Orientation: person, place, situation  Memory: Grossly intact  Attention Span/Concentration: Grossly intact  Cognition: grossly intact  Insight: fair  Judgment: good    Laboratory:  Recent Results (from the past 168 hour(s))   Toxicology screen, urine    Collection Time: 18 11:12 AM   Result Value Ref Range    Alcohol, Urine <10 <10 mg/dL    Benzodiazepines Negative     Methadone metabolites Negative     Cocaine (Metab.) Negative     Opiate Scrn, Ur Negative     Barbiturate Screen, Ur Negative     Amphetamine Screen, Ur Negative     THC Negative     Phencyclidine Negative     Creatinine, Random Ur 88.0 15.0 - 325.0 mg/dL    Toxicology Information SEE COMMENT    POCT BREATH ALCOHOL TEST    Collection Time: 18 11:12 AM   Result Value Ref Range    Breath Alcohol 0.000    Alcohol Biomarkers, urine    Collection Time: 18 11:12 AM   Result Value Ref Range    Ethyl Glucuronide " NEGATIVE    POCT BREATH ALCOHOL TEST    Collection Time: 06/19/18 10:52 AM   Result Value Ref Range    Breath Alcohol 0.000    Toxicology screen, urine    Collection Time: 06/20/18 10:45 AM   Result Value Ref Range    Alcohol, Urine <10 <10 mg/dL    Benzodiazepines Negative     Methadone metabolites Negative     Cocaine (Metab.) Negative     Opiate Scrn, Ur Negative     Barbiturate Screen, Ur Negative     Amphetamine Screen, Ur Negative     THC Negative     Phencyclidine Negative     Creatinine, Random Ur 89.0 15.0 - 325.0 mg/dL    Toxicology Information SEE COMMENT    POCT BREATH ALCOHOL TEST    Collection Time: 06/20/18 10:46 AM   Result Value Ref Range    Breath Alcohol 0.000      ASSESSMENT:     Alcohol Use Disorder, severe  Opiate Use Disorder, moderate to severe   Bipolar I Disorder, MRE depressed     PLAN:     · Continue patient on ABU protocol.  · Breathalyzer daily and urine toxicology three times a week.   · Patient counseled on abstinence from alcohol, tramadol     Medications:     · Will continue with Zyprexa 10 mg qhs, Klonopin 0.5 mg qhs, and Lexapro 20 mg daily for management of bipolar disorder  · Continue with gabapentin 100 mg qam and noon, and 300mg qhs  · Continue Naltrexone 50 mg daily for alcohol cravings     Status: Continue treatment on ABU    Patient's Intervention Response: accepting

## 2018-06-21 NOTE — PLAN OF CARE
06/21/18 1300   Activity/Group Therapy Checklist   Group Relapse Prevention  (Step Work )   Attendance Attended   Follows Direction Followed directions   Group Interactions/Observations Interacted appropriately   Affect/Mood Range Normal range   Affect/Mood Display Appropriate   Goal Progression Progressing

## 2018-06-21 NOTE — PROGRESS NOTES
Group Psychotherapy (PhD/LCSW)    Site: [facility]    Clinical status of patient: Intensive Outpatient Program (IOP)    Date: 6/21/2018    Group Focus: DBT-Based Group Therapy    Length of service: 70589 - 45-50 minutes    Number of patients in attendance: 16    Referred by: Addictive Behavior Unit Treatment Team    Target symptoms: Alcohol Abuse    Patient's response to treatment: Active Listening; Self-disclosure     Progress toward goals: Progressing adequately    Interval History:  Session focus was Interpersonal Effectiveness:  Validation.  Patients were encouraged to focus on validation of themselves by enhancing their ability to hear, understand, and respect themselves.    Diagnosis: Alcohol Use d/o, severe dependence     Plan: Continue treatment on ABU

## 2018-06-21 NOTE — PLAN OF CARE
06/21/18 1400   Activity/Group Therapy Checklist   Group Goals/Reflection  (process )   Attendance Attended   Follows Direction Followed directions   Group Interactions/Observations Interacted appropriately   Affect/Mood Range Normal range   Affect/Mood Display Appropriate   Goal Progression Progressing

## 2018-06-21 NOTE — PROGRESS NOTES
Group Psychotherapy (PhD/LCSW)    Site: [facility]    Clinical status of patient: Intensive Outpatient Program (IOP)    Date: 6/20/2018    Group Focus: Psychodynamic Group Psycotherapy    Length of service: 17130 - 45-50 minutes    Number of patients in attendance: 13    Referred by: Addictive Behavior Unit Treatment Team    Target symptoms: alcohol abuse    Patient's response to treatment: Active Listening and Self-disclosure    Progress toward goals: Progressing adequately    Interval History:  Discussed what she is grateful for.    Diagnosis: alcohol use disorder, severe, dependence    Plan: Continue treatment on ABU

## 2018-06-22 ENCOUNTER — HOSPITAL ENCOUNTER (OUTPATIENT)
Dept: PSYCHIATRY | Facility: HOSPITAL | Age: 68
Discharge: HOME | End: 2018-06-22
Attending: PSYCHIATRY & NEUROLOGY
Payer: COMMERCIAL

## 2018-06-22 VITALS — RESPIRATION RATE: 16 BRPM | SYSTOLIC BLOOD PRESSURE: 139 MMHG | HEART RATE: 62 BPM | DIASTOLIC BLOOD PRESSURE: 84 MMHG

## 2018-06-22 DIAGNOSIS — F10.20 ALCOHOL USE DISORDER, SEVERE, DEPENDENCE: Primary | ICD-10-CM

## 2018-06-22 LAB
7AMINOCLONAZEPAM UR CFM-MCNC: 229 NG/ML
7AMINOFLUNITRAZEPAM UR CFM-MCNC: NEGATIVE NG/ML
A-OH ALPRAZ UR CFM-MCNC: NEGATIVE NG/ML
A-OH-TRIAZOLAM UR CFM-MCNC: NEGATIVE NG/ML
BENZODIAZEPINE CONF CHAIN OF CUSTODY: NORMAL
BREATH ALCOHOL: 0
CODEINE UR-MCNC: NEGATIVE NG/ML
CODEINE UR-MCNC: NEGATIVE NG/ML
HYDROCODONE UR-MCNC: NEGATIVE NG/ML
HYDROMORPHONE UR-MCNC: NEGATIVE NG/ML
LORAZEPAM UR CFM-MCNC: NEGATIVE NG/ML
MORPHINE UR-MCNC: NEGATIVE NG/ML
NALOXONE BY LS MS/MS: NEGATIVE NG/ML
NORDIAZEPAM UR CFM-MCNC: NEGATIVE NG/ML
NORHYDROCODONE BY LC MS/MS: NEGATIVE NG/ML
NOROXYCODONE BY LC-MS/MS: NEGATIVE NG/ML
NOROXYMORPHONE BY LC-MS/MS: NEGATIVE NG/ML
OH-ETHYLFLURAZ UR CFM-MCNC: NEGATIVE NG/ML
OXAZEPAM UR CFM-MCNC: NEGATIVE NG/ML
OXYCODONE UR-MCNC: NEGATIVE NG/ML
OXYCODONE UR-MCNC: NEGATIVE NG/ML
TEMAZEPAM UR CFM-MCNC: NEGATIVE NG/ML
TOXICOLOGIST REVIEW: NORMAL
TOXICOLOGIST REVIEW: NORMAL

## 2018-06-22 PROCEDURE — 90853 GROUP PSYCHOTHERAPY: CPT | Performed by: SOCIAL WORKER

## 2018-06-22 PROCEDURE — 90853 GROUP PSYCHOTHERAPY: CPT

## 2018-06-22 PROCEDURE — 90853 GROUP PSYCHOTHERAPY: CPT | Mod: ,,, | Performed by: PSYCHOLOGIST

## 2018-06-22 NOTE — PLAN OF CARE
06/22/18 1400   Activity/Group Therapy Checklist   Group Relapse Prevention  (Step Work)   Attendance Attended   Follows Direction Followed directions   Group Interactions/Observations Interacted appropriately   Affect/Mood Range Normal range   Affect/Mood Display Appropriate   Goal Progression Progressing

## 2018-06-22 NOTE — PLAN OF CARE
06/22/18 1000   Activity/Group Therapy Checklist   Group Goals/Reflection  (life story )   Attendance Attended   Follows Direction Followed directions   Group Interactions/Observations Interacted appropriately   Affect/Mood Range Normal range   Affect/Mood Display Appropriate   Goal Progression Progressing

## 2018-06-22 NOTE — PROGRESS NOTES
Group Psychotherapy (PhD/LCSW)    Site: [facility]    Clinical status of patient: Intensive Outpatient Program (IOP)    Date: 06/01/2018    Group Focus: Stress Management    Length of service: 33213 - 45-50 minutes    Number of patients in attendance: 15    Referred by: Addiction Behavioral Unit Treatment Team    Target symptoms: Anxiety, Depression    Patient's response to treatment: Active Listening    Progress toward goals: Progressing adequately    Interval History: Group learned mindfulness techniques (breath, senses, eating) to improve present-moment awareness, impulsive behavior tendencies, and tolerance of various emotional states.    Diagnosis: Alcohol Use Disorder    Plan: Continue treatment on ABU

## 2018-06-22 NOTE — PROGRESS NOTES
Group Psychotherapy (PhD/LCSW)    Site: [facility]    Clinical status of patient: Intensive Outpatient Program (IOP)    Date: 6/20/2018    Group Focus: Psychodynamic Group Psycotherapy    Length of service: 39171 - 45-50 minutes    Number of patients in attendance: 13    Referred by: Addictive Behavior Unit Treatment Team    Target symptoms: alcohol abuse    Patient's response to treatment: Active Listening and Self-disclosure    Progress toward goals: Progressing adequately    Interval History:  She expressed gratitude for the program and for those who said goodbye to her.  She provided feedback for another member struggling with interpersonal issues.    Diagnosis: alcohol use disorder, severe, dependence    Plan: Continue treatment on ABU

## 2018-06-26 DIAGNOSIS — F31.9 BIPOLAR I DISORDER, CURRENT EPISODE DEPRESSED: ICD-10-CM

## 2018-06-26 RX ORDER — OLANZAPINE 10 MG/1
TABLET ORAL
Qty: 30 TABLET | Refills: 3 | Status: SHIPPED | OUTPATIENT
Start: 2018-06-26 | End: 2018-10-26 | Stop reason: SDUPTHER

## 2018-07-02 DIAGNOSIS — F31.9 BIPOLAR I DISORDER, CURRENT EPISODE DEPRESSED: ICD-10-CM

## 2018-07-02 RX ORDER — CLONAZEPAM 1 MG/1
TABLET ORAL
Qty: 30 TABLET | Refills: 0 | Status: SHIPPED | OUTPATIENT
Start: 2018-07-02 | End: 2018-09-08 | Stop reason: SDUPTHER

## 2018-07-13 NOTE — PROGRESS NOTES
Group Psychotherapy (MD)     Site: [facility]     Clinical status of patient: Intensive Outpatient Program (IOP)     Date: 6/20/18     Group Focus: Medical and Neuropsychiatric Bases of Psychiatric Disease and Addiction: Alcohol use     Length of service: 82460 - 45-50 minutes     Number of patients in attendance: 16     Referred by: Addictive Behavioral Unit Treatment Team     Target symptoms: Substance use, risk for substance use, self medicating behaviors, anxiety.      Patient's response to treatment: Active Listening       Progress toward goals: progressing adequately     Interval hx: Provided education about alcohol including effects on physical and medical health, explained neurobiology of alcohol effects and addictive circuits in the brain. Provided overview of concept of self medication and contrasted it with self care. Discussed role of anxiety in alcohol use disorder and contribution of alcohol use to anxiety and depression. had discussion of possible negative effects of alcohol use on various behaviors and symptoms.     Diagnosis: Alcohol use disorder, severe, dependence     Plan: Continue treatment on ABU

## 2018-07-13 NOTE — PROGRESS NOTES
"Group Psychotherapy (MD)     Site: [facility]     Clinical status of patient: Intensive Outpatient Program (IOP)     Date: 6/13/18     Group Focus: Medical and Neuropsychiatric Bases of Psychiatric Disease and Addiction: anxiety     Length of service: 82874 - 45-50 minutes     Number of patients in attendance: 16     Referred by: Addictive Behavioral Unit Treatment Team     Target symptoms: Anxiety     Patient's response to treatment: Active Listening       Interval history: Discussed psychiatric symptoms of anxiety and neurochemical basis of anxiety, relating it to addiction and other psychiatric disorders. Provided rationale for use of various anxiety treatments, introduced concept of avoidance and discussed ways to gradually expose self to sources of anxiety. Explored responses to anxiety and helped patient separte those responses into either 'self care" or "self medication".      Progress towards goals: progressing adequately       Diagnosis: Alcohol Use Disorder     Plan: Continue treatment on ABU             "

## 2018-09-08 DIAGNOSIS — F31.9 BIPOLAR I DISORDER, CURRENT EPISODE DEPRESSED: ICD-10-CM

## 2018-09-08 RX ORDER — CLONAZEPAM 1 MG/1
TABLET ORAL
Qty: 30 TABLET | Refills: 0 | Status: SHIPPED | OUTPATIENT
Start: 2018-09-08 | End: 2018-10-30

## 2018-10-26 DIAGNOSIS — F31.9 BIPOLAR I DISORDER, CURRENT EPISODE DEPRESSED: ICD-10-CM

## 2018-10-26 RX ORDER — OLANZAPINE 10 MG/1
TABLET ORAL
Qty: 30 TABLET | Refills: 3 | Status: SHIPPED | OUTPATIENT
Start: 2018-10-26 | End: 2018-10-30

## 2018-10-30 ENCOUNTER — PATIENT MESSAGE (OUTPATIENT)
Dept: PSYCHIATRY | Facility: CLINIC | Age: 68
End: 2018-10-30

## 2018-10-30 ENCOUNTER — OFFICE VISIT (OUTPATIENT)
Dept: PSYCHIATRY | Facility: CLINIC | Age: 68
End: 2018-10-30
Payer: COMMERCIAL

## 2018-10-30 VITALS
HEART RATE: 65 BPM | BODY MASS INDEX: 24.69 KG/M2 | DIASTOLIC BLOOD PRESSURE: 88 MMHG | HEIGHT: 67 IN | WEIGHT: 157.31 LBS | SYSTOLIC BLOOD PRESSURE: 186 MMHG

## 2018-10-30 DIAGNOSIS — F31.9 BIPOLAR I DISORDER, CURRENT EPISODE DEPRESSED: Primary | ICD-10-CM

## 2018-10-30 DIAGNOSIS — F10.21 ALCOHOL USE DISORDER, SEVERE, IN EARLY REMISSION: ICD-10-CM

## 2018-10-30 DIAGNOSIS — F11.11: ICD-10-CM

## 2018-10-30 PROCEDURE — 99214 OFFICE O/P EST MOD 30 MIN: CPT | Mod: S$GLB,,, | Performed by: PSYCHIATRY & NEUROLOGY

## 2018-10-30 PROCEDURE — 99999 PR PBB SHADOW E&M-EST. PATIENT-LVL III: CPT | Mod: PBBFAC,,, | Performed by: PSYCHIATRY & NEUROLOGY

## 2018-10-30 RX ORDER — OLANZAPINE 7.5 MG/1
7.5 TABLET ORAL NIGHTLY
Qty: 30 TABLET | Refills: 5 | Status: SHIPPED | OUTPATIENT
Start: 2018-10-30 | End: 2019-01-23

## 2018-10-30 RX ORDER — GABAPENTIN 300 MG/1
300 CAPSULE ORAL 3 TIMES DAILY PRN
Qty: 90 CAPSULE | Refills: 5 | Status: SHIPPED | OUTPATIENT
Start: 2018-10-30 | End: 2019-02-20 | Stop reason: SDUPTHER

## 2018-10-30 RX ORDER — NALTREXONE HYDROCHLORIDE 50 MG/1
50 TABLET, FILM COATED ORAL DAILY
Qty: 30 TABLET | Refills: 5 | Status: SHIPPED | OUTPATIENT
Start: 2018-10-30 | End: 2018-11-29

## 2018-10-30 RX ORDER — CLONAZEPAM 1 MG/1
.5-1 TABLET ORAL NIGHTLY
Qty: 30 TABLET | Refills: 5 | Status: SHIPPED | OUTPATIENT
Start: 2018-10-30 | End: 2019-02-20 | Stop reason: SDUPTHER

## 2018-10-30 RX ORDER — ESCITALOPRAM OXALATE 20 MG/1
20 TABLET ORAL DAILY
Qty: 30 TABLET | Refills: 5 | Status: SHIPPED | OUTPATIENT
Start: 2018-10-30 | End: 2019-01-23

## 2018-10-30 RX ORDER — DISULFIRAM 250 MG/1
250 TABLET ORAL DAILY
Qty: 30 TABLET | Refills: 5 | Status: SHIPPED | OUTPATIENT
Start: 2018-10-30 | End: 2019-02-20 | Stop reason: SDUPTHER

## 2018-10-30 NOTE — PATIENT INSTRUCTIONS
1. Reduce olanzapine to 7.5 mg, sent new prescription for 7.5 mg tablets to your pharmacy.  2. Continue lexapro 20 mg daily for now.  3. Continue clonazepam 0.5-1 mg at bedtime for insomnia.  4. Continue gabapentin 300 mg three times a day (and at bedtime) as needed).  5. Continue naltrexone 50 mg and antabuse 250 mg daily to help with alcohol abstinence.  6. Check fasting lipids and blood glucose. Continue to monitor blood pressure.  7. Try to walk for one hour daily.  8. Resume ABU aftercare and AA attendance.  9. Want to see you back in about 2 months, contact me via the patient portal if having any issues. Let me know if you can't sleep with lower dose of olanzapine. Also let me know if you are unable to change weight and blood pressure with more exercise/lower dose of olanzapine or if mood is getting worse.

## 2018-10-30 NOTE — PROGRESS NOTES
Ambulatory Psychiatry Established Patient Follow-up Note      Chief Complaint  presents for followup of depression, alcohol use disorder    Time Spent  30 minutes    People Present  Patient     HPI  Left [facility] in late July. Patient entered [facility] for relapse on alcohol and for opioid abuse. Mood is not that great. Mood went down not long after completing. Depressed, bored, doesn't want to exercise. Sleep is good, sleeps 6 1/2 hours nightly, no naps.  Reports gaining weight, despite eating little. Unable to lose weight. Has not walked in a while. Motivation and energy are low. Denies excessive spending or compulsive behaviors. Has not been going to [employer], denies Presybeterian obsessions. Denies SI. Anhedonia. Notes higher bp.     ROS   Complete review of systems performed covering Constitutional, Eyes, ENT/Mouth, Cardiovascular, Respiratory, Gastrointestinal, Genitourinary, Musculoskeletal, Skin, Neurologic, Endocrine, and Allergy/Immune. Intermittent back pain. All other systems were negative.    Psych ROS covered elsewhere in note (HPI)    PFSH  Past Medical History reviewed: Yes  Family History reviewed: No  Social History reviewed: Yes  Medications/problem list/allergies reviewed: Yes    Medications  zyprexa 10  mg at bedtime.  Klonopin 0.5 mg qhs  Lexapro 15 mg daily    Allergies  Review of patient's allergies indicates:  No Known Allergies    EXAM  VITALS     RELEVANT LABS/STUDIES:  Lithium level   Order: [id number]   Status:  Final result Visible to patient:  No (Not Released) Next appt:  None         Ref Range & Units 13d ago  (9/24/16) 3wk ago  (9/10/16) 1mo ago  (9/5/16) 1mo ago  (9/3/16)    Lithium Lvl 0.6 - 1.2 mmol/L 0.7 0.8 0.5 (L) 0.4 (L)   Resulting Agency  [facility]      Specimen Collected: 09/24/16  9:48 AM Last Resulted: 09/24/16 10:25 AM Lab Flowsheet Order Details View Encounter Lab and Collection Details Routing Result History           TSH   Order: [id number]   Status:  Final result Visible to  patient:  No (Not Released) Next appt:  None         Ref Range & Units 13d ago   1mo ago   5yr ago      TSH 0.400 - 4.000 uIU/mL 0.997 0.868 1.02R   Resulting Agency  [facility]      Specimen Collected: 09/24/16  2:05 PM Last Resulted: 09/24/16  3:13 PM Lab Flowsheet Order Details View Encounter Lab and Collection Details Routing Result              PTH, intact   Order: [id number]   Status:  Final result Visible to patient:  No (Not Released) Next appt:  None      Ref Range & Units 13d ago     PTH, Intact 9.0 - 77.0 pg/mL 148.0 (H)   Resulting Agency  [facility]      Specimen Collected: 09/24/16  2:05 PM Last Resulted: 09/24/16  2:58 PM Lab Flowsheet Order Details View Encounter Lab and Collection Details Routing Result History           CBC auto differential   Order: [id number]   Status:  Final result Visible to patient:  No (Not Released) Next appt:  None         Ref Range & Units 12d ago   13d ago   1mo ago   5yr ago      WBC 3.90 - 12.70 K/uL 11.70 15.14 (H) 6.52 4.68 (L)R    RBC 4.00 - 5.40 M/uL 4.00 4.57 4.51 4.22    Hemoglobin 12.0 - 16.0 g/dL 10.1 (L) 11.8 (L) 10.3 (L) 13.0R    Hematocrit 37.0 - 48.5 % 33.1 (L) 37.2 35.4 (L) 37.7    MCV 82 - 98 fL 83 81 (L) 79 (L) 89.3R    MCH 27.0 - 31.0 pg 25.3 (L) 25.8 (L) 22.8 (L) 30.8R    MCHC 32.0 - 36.0 % 30.5 (L) 31.7 (L) 29.1 (L) 34.5R    RDW 11.5 - 14.5 % 21.6 (H) 21.0 (H) 16.7 (H) 12.6    Platelets 150 - 350 K/uL 222 244 276 224    MPV 9.2 - 12.9 fL 10.4 10.1 9.4 10.2    Gran # 1.8 - 7.7 K/uL 9.7 (H) 13.6 (H) 4.6 1.9    Lymph # 1.0 - 4.8 K/uL 1.2 0.7 (L) 1.1 2.0R    Mono # 0.3 - 1.0 K/uL 0.6 0.7 0.6 0.4R    Eos # 0.0 - 0.5 K/uL 0.2 0.0 0.2 0.3R    Baso # 0.00 - 0.20 K/uL 0.03 0.02 0.02 0.0R    Gran% 38.0 - 73.0 % 82.8 (H) 90.1 (H) 70.1 41.4R    Lymph% 18.0 - 48.0 % 10.5 (L) 4.8 (L) 16.9 (L) 42.3R    Mono% 4.0 - 15.0 % 4.9 4.6 9.0 9.4 (H)R    Eosinophil% 0.0 - 8.0 % 1.3 0.1 3.5 6.0 (H)R    Basophil% 0.0 - 1.9 % 0.3 0.1 0.3 0.9R    Differential Method  Automated  Automated Automated    Resulting Agency  [facility]      Specimen Collected: 09/25/16  5:44 AM Last Resulted: 09/25/16  7:26 AM Lab Flowsheet Order Details View Encounter Lab and Collection Details Routing Result History      R=Reference range differs from displayed range             Basic metabolic panel   Order: [id number]   Status:  Final result Visible to patient:  No (Not Released) Next appt:  None         Ref Range & Units 12d ago   13d ago   3wk ago   1mo ago      Sodium 136 - 145 mmol/L 142 141 140 144    Potassium 3.5 - 5.1 mmol/L 3.7 4.0CM 4.1 4.3    Chloride 95 - 110 mmol/L 113 (H) 108 112 (H) 111 (H)    CO2 23 - 29 mmol/L 23 22 (L) 21 (L) 26    Glucose 70 - 110 mg/dL 113 (H) 126 (H) 177 (H) 98    BUN, Bld 8 - 23 mg/dL 15 18 11 20    Creatinine 0.5 - 1.4 mg/dL 0.7 0.8 0.8 0.7    Calcium 8.7 - 10.5 mg/dL 7.9 (L) 8.4 (L) 8.6 (L) 9.1    Anion Gap 8 - 16 mmol/L 6 (L) 11 7 (L) 7 (L)    eGFR if African American >60 mL/min/1.73 m^2 >60.0 >60.0 >60.0 >60.0    eGFR if non African American >60 mL/min/1.73 m^2 >60.0 >60.0CM >60.0CM >60.0CM   Comments: Calculation used to obtain the estimated glomerular filtration   rate (eGFR) is the CKD-EPI equation. Since race is unknown   in our information system, the eGFR values for   -American and Non--American patients are given   for each creatinine result.      Resulting Agency  [facility]      Specimen Collected: 09/25/16  5:44 AM Last Resulted: 09/25/16  6:22 AM Lab Flowsheet Order Details View Encounter Lab and Collection Details Routing Result History      CM=Additional comments             BAL checked with breathalyzer during appointment was 0.128    PSYCHIATRIC EXAMINATION  Appearance: well groomed, appearing  stated age, normal weight  Behavior: cooperative, psychomotor retardation  Speech: normal rate and amount  Mood: depressed  Affect: blunted  Thought Process: linear   Thought Content: negative for delusions or suicidal ideations  or hallucintaitons.   Associations: intact  Memory: grossly intact.  Level of Consciousness/Orientation: grossly intact  Fund of Knowledge: good  Attention: fair  Language: fluent, naming intact  Insight: fair  Judgment: fair    Neurological signs: no involuntary movements or tremor  Gait: normal    Medical Decision Making    IMPRESSION   65 yo  retired woman with past psych hx significant for seasonal depressive episodes, anxiety and alcohol use disorder in sustained remission, off antidepressants for past several years, now presenting with one month hx of depressed mood associated with hyperreligious obsessions. Patient admitted to [facility] for treatment of major depressive episode with psychosis and had partial response to combination of lexapro 20 mg and zyprexa 7.5 mg at bedtime. Pt on follow up on 8/11 denied current hyperreligiousity or obsessiveness but still reports depression. Continued on lexapro 20 mg and increased dose of zyprexa 10 mg, pt returned less than one week later with several days of hyperactivity, severe insomnia and restlessness with euthymic mood, on exam affect bright and mildly expansive. On review of remote records, patient with periods of elevated mood possibly coincing with past episodes of substance use, prior diagnosis of bipolar disorder for periods of insomnia and hyperactivity. +Family hx (daughter with bipolar), as well as mixed features to prior depressions (delusions, hyperreligiousity, obsessiveness) consistent with bipolar II disorder. Then reduced lexapro to 10 mg, started on klonopin for sleep and continued zyprexa at 10 mg. Patient returned one week later on 8/25 with lower mood, variable energy but still appearing  mildly agitated, having incongruent affect and hyperreligious on exam. Of note pt had decreased zyprexa fromo 10 mg to 3.75 mg several days before due to concern for mild transaminitis and lower energy. Attempted crosstaper of zyprexa to risperdal but patient  resumed zyprexa due to continued extremes mood swings and insomnia. Continued to taper off lexapro. Patient returned 2 weeks ago dysphoric and suicidal as well as with psychomotor agitation, increased goal directed activity and hyperreligiousity. Concerned for risk to self given severity of despair, expressed desire to die, Jainism conviction and agitation. Admitted to [facility], where sx stabilized with addition of lithium. Patient without any further suicidal ideation, delusions or mixed sx, but now reporting apathy and sustained depression, also complaining of some cognitive effects and blurry vision that she ties to increase in lithium dose. Reduced lithium from 750 mg to 600 mg while keeping other meds the same. Pt returned today reporting euthymia and fairly stable mood apart from some overshopping, but states this is achronic impulse not clearly related to mood. Continued patient on lithium 600 mg, zyprexa 10, lexapro 5 and klonopin 0.5-1 for 2 weeks. Per  patient has been stable, however patient reporting some dysphoria. Started patient on lamictal and tapering off zyprexa and lexapro, patient reporting good mood, denying depression or manic sx, appearing euthymic. However patient returned 2 weeks later in mid November with high anxiety, insomnia, suggestive of mixed hypomanic state given high level of activity, mild impulsivity and frequent good but labile mood. Started pateint back on zyprexa 2.5 mg qhs with good effect, calmer and euthymic but still with anxiety, obsessive thoughts and insomnia. Increased lamictal to target anxiety, pt increased to 75 mg only rather than 100 mg due to misunderstanding. Has been mostly stable but with periodic breakthroughs of insomnia, depressed mood and racing thoughts which have responded to lamictal increases and temporary increases in zyprexa. Mood in general has been more positive with higher doses of lamictal, but still with breakthrough periods of anxiety,  dypshoria and agitation. Due to clear responses to zyprexa and lamictal but not to lithium other than possible help with mood stabilization, have increased zyprexa dose to 7.5 mg qhs permanently and reduced dose of lithium to 450 mg qhs. Patient has continued to have depression with obsessive ruminations without clear evidence of stone or mixed state currently. Started patient on lexapro 2.5 mg daily with mild benefit to mood, no sign currently of activation. Gradually increased to 10 mg daily with resulting improvement in depression. Pt returned for follow up in summer 2017 reporting euthymia and stable mood, with only mild Hinduism ruminations about salvation (chronic but much improved today) and insomnia which responds to combination of zyprexa and klonopin. Subsequently tapered off of lithium. Pt returned in FAll 2017 with worsened mood, mild depressive sx, after which we increased her lexapro to 15 mg. She returned in Spring 2018 for follow up after a period of apparent stability today frankly intoxicated with BAL above legal limit, having driven to office, reporting daily heavy use of alcohol and occasional abuse of tramadol.  Of note has been planning a last minute wedding and patient has history of stone/mixed states in the Spring, raising concern for bipolar disorder precipitating her relapse. Patient referred to [facility] for alcohol use disorder and opioid abuse, completed at end of June 2018. Has subsequently been sober but mood has gradually declined per patient report. On exam appears blunted, mildly psychomotor retarded and with recent weight gain. Appears to be in mild-moderate depressive episode.    DIAGNOSES  Alcohol use Disorder, severe, in early remission  Bipolar Disorder I, most recent episode depressed.  Opioid Use disorder, moderate, in early remission.    PLAN  -reduce zyprexa to 7.5 mg qhs. Patient appears more depressed and has had weight gain. May be overmedicated and energy may improve with  lower dose of zyprexa. Discussed how weight gain and low energy could be related to zyprexa.  -encouraged her to resume exercise, walking daily for one hour, which should help with weight gain and mood.   -recommended that she follow up with PCP for blood pressure monitoring and to check fasting glucose and lipids.  -continue lexapro 20 mg   -  Patient particularly responsive to lexapro and zyprexa combination in the past, depressed now despite it, but less dysphoric and unstable than seen in the past. Consider transitioning to latuda or seroquel if side effects emerge.   -lithium helpful with stone but not depression. Zyprexa has been helpful for stone, negative obsessions and for acute depression, however has still had depression emerge while on it and has led to weight gain.    Lexapro has been helpful with depression in the past and with obsessive thoughts, but not clearly helpful now. Lamictal not helpful with depression or mood stabilization.   -monitor for relapse on tramadol. continu naltrexone for opioid and alcohol use disorders  -continue antabuse 250 mg for alcohol use disorder, check comprehensive metabolic panel at next visit.  -continue gabapentin 300 mg tid prn pain, insomnia.  -continue klonopin 0.5-1 mg qhs prn insomnia.   -recommended resumption of AA and ABU aftercare.   -return in 2 months, encouraged to set up patient portal and to contact me via portal..     More than 50% of the time was spent on counseling and coordination of care.  Psychoeducation, behavioral counseling.

## 2019-01-18 ENCOUNTER — ANESTHESIA (OUTPATIENT)
Dept: SURGERY | Facility: OTHER | Age: 69
End: 2019-01-18
Payer: COMMERCIAL

## 2019-01-18 ENCOUNTER — ANESTHESIA EVENT (OUTPATIENT)
Dept: SURGERY | Facility: OTHER | Age: 69
End: 2019-01-18
Payer: COMMERCIAL

## 2019-01-18 ENCOUNTER — HOSPITAL ENCOUNTER (OUTPATIENT)
Facility: OTHER | Age: 69
Discharge: HOME | End: 2019-01-18
Attending: EMERGENCY MEDICINE | Admitting: ORTHOPAEDIC SURGERY
Payer: COMMERCIAL

## 2019-01-18 VITALS
TEMPERATURE: 98 F | DIASTOLIC BLOOD PRESSURE: 82 MMHG | HEIGHT: 67 IN | BODY MASS INDEX: 23.54 KG/M2 | WEIGHT: 150 LBS | HEART RATE: 88 BPM | SYSTOLIC BLOOD PRESSURE: 150 MMHG | OXYGEN SATURATION: 98 % | RESPIRATION RATE: 16 BRPM

## 2019-01-18 DIAGNOSIS — S01.81XA LACERATION OF FOREHEAD, INITIAL ENCOUNTER: ICD-10-CM

## 2019-01-18 DIAGNOSIS — S09.90XA INJURY OF HEAD, INITIAL ENCOUNTER: ICD-10-CM

## 2019-01-18 DIAGNOSIS — T14.90XA TRAUMA: ICD-10-CM

## 2019-01-18 DIAGNOSIS — S52.612A CLOSED DISPLACED FRACTURE OF STYLOID PROCESS OF LEFT ULNA, INITIAL ENCOUNTER: ICD-10-CM

## 2019-01-18 DIAGNOSIS — S52.592A OTHER CLOSED FRACTURE OF DISTAL END OF LEFT RADIUS, INITIAL ENCOUNTER: Primary | ICD-10-CM

## 2019-01-18 DIAGNOSIS — S52.572A OTHER CLOSED INTRA-ARTICULAR FRACTURE OF DISTAL END OF LEFT RADIUS, INITIAL ENCOUNTER: ICD-10-CM

## 2019-01-18 PROBLEM — S52.502A CLOSED FRACTURE OF LEFT DISTAL RADIUS: Status: ACTIVE | Noted: 2019-01-18

## 2019-01-18 PROCEDURE — 63600175 PHARM REV CODE 636 W HCPCS: Performed by: EMERGENCY MEDICINE

## 2019-01-18 PROCEDURE — 99285 EMERGENCY DEPT VISIT HI MDM: CPT | Mod: 25

## 2019-01-18 PROCEDURE — 90471 IMMUNIZATION ADMIN: CPT | Performed by: EMERGENCY MEDICINE

## 2019-01-18 PROCEDURE — 36000711: Performed by: ORTHOPAEDIC SURGERY

## 2019-01-18 PROCEDURE — 25000003 PHARM REV CODE 250: Performed by: EMERGENCY MEDICINE

## 2019-01-18 PROCEDURE — 71000039 HC RECOVERY, EACH ADD'L HOUR: Performed by: ORTHOPAEDIC SURGERY

## 2019-01-18 PROCEDURE — G0378 HOSPITAL OBSERVATION PER HR: HCPCS

## 2019-01-18 PROCEDURE — 27201423 OPTIME MED/SURG SUP & DEVICES STERILE SUPPLY: Performed by: ORTHOPAEDIC SURGERY

## 2019-01-18 PROCEDURE — 37000008 HC ANESTHESIA 1ST 15 MINUTES: Performed by: ORTHOPAEDIC SURGERY

## 2019-01-18 PROCEDURE — 12013 RPR F/E/E/N/L/M 2.6-5.0 CM: CPT

## 2019-01-18 PROCEDURE — 96374 THER/PROPH/DIAG INJ IV PUSH: CPT

## 2019-01-18 PROCEDURE — 29125 APPL SHORT ARM SPLINT STATIC: CPT

## 2019-01-18 PROCEDURE — 25000003 PHARM REV CODE 250: Performed by: ANESTHESIOLOGY

## 2019-01-18 PROCEDURE — 25000003 PHARM REV CODE 250: Performed by: NURSE ANESTHETIST, CERTIFIED REGISTERED

## 2019-01-18 PROCEDURE — C1769 GUIDE WIRE: HCPCS | Performed by: ORTHOPAEDIC SURGERY

## 2019-01-18 PROCEDURE — 63600175 PHARM REV CODE 636 W HCPCS: Performed by: ANESTHESIOLOGY

## 2019-01-18 PROCEDURE — 96376 TX/PRO/DX INJ SAME DRUG ADON: CPT

## 2019-01-18 PROCEDURE — 36000710: Performed by: ORTHOPAEDIC SURGERY

## 2019-01-18 PROCEDURE — 71000015 HC POSTOP RECOV 1ST HR: Performed by: ORTHOPAEDIC SURGERY

## 2019-01-18 PROCEDURE — 90715 TDAP VACCINE 7 YRS/> IM: CPT | Performed by: EMERGENCY MEDICINE

## 2019-01-18 PROCEDURE — 63600175 PHARM REV CODE 636 W HCPCS: Performed by: NURSE ANESTHETIST, CERTIFIED REGISTERED

## 2019-01-18 PROCEDURE — C1713 ANCHOR/SCREW BN/BN,TIS/BN: HCPCS | Performed by: ORTHOPAEDIC SURGERY

## 2019-01-18 PROCEDURE — 71000033 HC RECOVERY, INTIAL HOUR: Performed by: ORTHOPAEDIC SURGERY

## 2019-01-18 PROCEDURE — 71000016 HC POSTOP RECOV ADDL HR: Performed by: ORTHOPAEDIC SURGERY

## 2019-01-18 PROCEDURE — 37000009 HC ANESTHESIA EA ADD 15 MINS: Performed by: ORTHOPAEDIC SURGERY

## 2019-01-18 DEVICE — SCREW CORTICAL 3.5 X 12: Type: IMPLANTABLE DEVICE | Site: WRIST | Status: FUNCTIONAL

## 2019-01-18 DEVICE — PEG THREADED 2.5 X 20: Type: IMPLANTABLE DEVICE | Site: WRIST | Status: FUNCTIONAL

## 2019-01-18 DEVICE — PLATE DISTAL VOLAR LT HAND: Type: IMPLANTABLE DEVICE | Site: WRIST | Status: FUNCTIONAL

## 2019-01-18 DEVICE — PEG SUPPORT SUBCHONDRAL 2.0X20: Type: IMPLANTABLE DEVICE | Site: WRIST | Status: FUNCTIONAL

## 2019-01-18 DEVICE — PEG FULLY THRD 2.5X18: Type: IMPLANTABLE DEVICE | Site: WRIST | Status: FUNCTIONAL

## 2019-01-18 DEVICE — PEG THREADED 2.5 X 18: Type: IMPLANTABLE DEVICE | Site: WRIST | Status: FUNCTIONAL

## 2019-01-18 DEVICE — PEG THREADED 2.5MM 22MM LONG: Type: IMPLANTABLE DEVICE | Site: WRIST | Status: FUNCTIONAL

## 2019-01-18 RX ORDER — ONDANSETRON 2 MG/ML
4 INJECTION INTRAMUSCULAR; INTRAVENOUS DAILY PRN
Status: DISCONTINUED | OUTPATIENT
Start: 2019-01-18 | End: 2019-01-18 | Stop reason: HOSPADM

## 2019-01-18 RX ORDER — HYDROMORPHONE HYDROCHLORIDE 2 MG/ML
0.4 INJECTION, SOLUTION INTRAMUSCULAR; INTRAVENOUS; SUBCUTANEOUS EVERY 5 MIN PRN
Status: DISCONTINUED | OUTPATIENT
Start: 2019-01-18 | End: 2019-01-18 | Stop reason: HOSPADM

## 2019-01-18 RX ORDER — SODIUM CHLORIDE, SODIUM LACTATE, POTASSIUM CHLORIDE, CALCIUM CHLORIDE 600; 310; 30; 20 MG/100ML; MG/100ML; MG/100ML; MG/100ML
INJECTION, SOLUTION INTRAVENOUS CONTINUOUS PRN
Status: DISCONTINUED | OUTPATIENT
Start: 2019-01-18 | End: 2019-01-18

## 2019-01-18 RX ORDER — SODIUM CHLORIDE 0.9 % (FLUSH) 0.9 %
5 SYRINGE (ML) INJECTION
Status: CANCELLED | OUTPATIENT
Start: 2019-01-18

## 2019-01-18 RX ORDER — OXYCODONE HYDROCHLORIDE 5 MG/1
5 TABLET ORAL
Status: DISCONTINUED | OUTPATIENT
Start: 2019-01-18 | End: 2019-01-18 | Stop reason: HOSPADM

## 2019-01-18 RX ORDER — FENTANYL CITRATE 50 UG/ML
25 INJECTION, SOLUTION INTRAMUSCULAR; INTRAVENOUS EVERY 5 MIN PRN
Status: COMPLETED | OUTPATIENT
Start: 2019-01-18 | End: 2019-01-18

## 2019-01-18 RX ORDER — MORPHINE SULFATE 4 MG/ML
4 INJECTION, SOLUTION INTRAMUSCULAR; INTRAVENOUS
Status: COMPLETED | OUTPATIENT
Start: 2019-01-18 | End: 2019-01-18

## 2019-01-18 RX ORDER — FENTANYL CITRATE 50 UG/ML
100 INJECTION, SOLUTION INTRAMUSCULAR; INTRAVENOUS EVERY 5 MIN PRN
Status: COMPLETED | OUTPATIENT
Start: 2019-01-18 | End: 2019-01-18

## 2019-01-18 RX ORDER — GLYCOPYRROLATE 0.2 MG/ML
INJECTION INTRAMUSCULAR; INTRAVENOUS
Status: DISCONTINUED | OUTPATIENT
Start: 2019-01-18 | End: 2019-01-18

## 2019-01-18 RX ORDER — DEXAMETHASONE SODIUM PHOSPHATE 4 MG/ML
INJECTION, SOLUTION INTRA-ARTICULAR; INTRALESIONAL; INTRAMUSCULAR; INTRAVENOUS; SOFT TISSUE
Status: DISCONTINUED | OUTPATIENT
Start: 2019-01-18 | End: 2019-01-18

## 2019-01-18 RX ORDER — LIDOCAINE HYDROCHLORIDE AND EPINEPHRINE 20; 10 MG/ML; UG/ML
10 INJECTION, SOLUTION INFILTRATION; PERINEURAL
Status: COMPLETED | OUTPATIENT
Start: 2019-01-18 | End: 2019-01-18

## 2019-01-18 RX ORDER — HYDROCODONE BITARTRATE AND ACETAMINOPHEN 5; 325 MG/1; MG/1
1 TABLET ORAL EVERY 4 HOURS PRN
Status: DISCONTINUED | OUTPATIENT
Start: 2019-01-18 | End: 2019-01-18 | Stop reason: HOSPADM

## 2019-01-18 RX ORDER — MIDAZOLAM HYDROCHLORIDE 1 MG/ML
5 INJECTION INTRAMUSCULAR; INTRAVENOUS ONCE AS NEEDED
Status: DISCONTINUED | OUTPATIENT
Start: 2019-01-18 | End: 2019-01-18 | Stop reason: HOSPADM

## 2019-01-18 RX ORDER — ONDANSETRON HYDROCHLORIDE 2 MG/ML
INJECTION, SOLUTION INTRAMUSCULAR; INTRAVENOUS
Status: DISCONTINUED | OUTPATIENT
Start: 2019-01-18 | End: 2019-01-18

## 2019-01-18 RX ORDER — FENTANYL CITRATE 50 UG/ML
100 INJECTION, SOLUTION INTRAMUSCULAR; INTRAVENOUS EVERY 5 MIN PRN
Status: DISCONTINUED | OUTPATIENT
Start: 2019-01-18 | End: 2019-01-18 | Stop reason: SDUPTHER

## 2019-01-18 RX ORDER — SODIUM CHLORIDE 0.9 % (FLUSH) 0.9 %
3 SYRINGE (ML) INJECTION
Status: DISCONTINUED | OUTPATIENT
Start: 2019-01-18 | End: 2019-01-18 | Stop reason: HOSPADM

## 2019-01-18 RX ORDER — ACETAMINOPHEN 10 MG/ML
INJECTION, SOLUTION INTRAVENOUS
Status: DISCONTINUED | OUTPATIENT
Start: 2019-01-18 | End: 2019-01-18

## 2019-01-18 RX ORDER — MORPHINE SULFATE 2 MG/ML
2 INJECTION, SOLUTION INTRAMUSCULAR; INTRAVENOUS
Status: COMPLETED | OUTPATIENT
Start: 2019-01-18 | End: 2019-01-18

## 2019-01-18 RX ORDER — PROPOFOL 10 MG/ML
VIAL (ML) INTRAVENOUS
Status: DISCONTINUED | OUTPATIENT
Start: 2019-01-18 | End: 2019-01-18

## 2019-01-18 RX ORDER — ROPIVACAINE HYDROCHLORIDE 5 MG/ML
INJECTION, SOLUTION EPIDURAL; INFILTRATION; PERINEURAL
Status: COMPLETED | OUTPATIENT
Start: 2019-01-18 | End: 2019-01-18

## 2019-01-18 RX ORDER — MEPERIDINE HYDROCHLORIDE 25 MG/ML
12.5 INJECTION INTRAMUSCULAR; INTRAVENOUS; SUBCUTANEOUS ONCE AS NEEDED
Status: DISCONTINUED | OUTPATIENT
Start: 2019-01-18 | End: 2019-01-18 | Stop reason: HOSPADM

## 2019-01-18 RX ORDER — HYDROCODONE BITARTRATE AND ACETAMINOPHEN 5; 325 MG/1; MG/1
1 TABLET ORAL EVERY 6 HOURS PRN
Qty: 20 TABLET | Refills: 0 | Status: SHIPPED | OUTPATIENT
Start: 2019-01-18 | End: 2019-02-18

## 2019-01-18 RX ORDER — FENTANYL CITRATE 50 UG/ML
25 INJECTION, SOLUTION INTRAMUSCULAR; INTRAVENOUS EVERY 5 MIN PRN
Status: DISCONTINUED | OUTPATIENT
Start: 2019-01-18 | End: 2019-01-18 | Stop reason: HOSPADM

## 2019-01-18 RX ORDER — LIDOCAINE HCL/PF 100 MG/5ML
SYRINGE (ML) INTRAVENOUS
Status: DISCONTINUED | OUTPATIENT
Start: 2019-01-18 | End: 2019-01-18

## 2019-01-18 RX ORDER — MIDAZOLAM HYDROCHLORIDE 1 MG/ML
2 INJECTION INTRAMUSCULAR; INTRAVENOUS
Status: COMPLETED | OUTPATIENT
Start: 2019-01-18 | End: 2019-01-18

## 2019-01-18 RX ADMIN — OXYCODONE HYDROCHLORIDE 5 MG: 5 TABLET ORAL at 02:01

## 2019-01-18 RX ADMIN — MORPHINE SULFATE 2 MG: 2 INJECTION, SOLUTION INTRAMUSCULAR; INTRAVENOUS at 09:01

## 2019-01-18 RX ADMIN — SODIUM CHLORIDE, SODIUM LACTATE, POTASSIUM CHLORIDE, AND CALCIUM CHLORIDE: 600; 310; 30; 20 INJECTION, SOLUTION INTRAVENOUS at 11:01

## 2019-01-18 RX ADMIN — LIDOCAINE HYDROCHLORIDE 100 MG: 20 INJECTION, SOLUTION INTRAVENOUS at 12:01

## 2019-01-18 RX ADMIN — FENTANYL CITRATE 50 MCG: 50 INJECTION, SOLUTION INTRAMUSCULAR; INTRAVENOUS at 12:01

## 2019-01-18 RX ADMIN — FENTANYL CITRATE 25 MCG: 50 INJECTION, SOLUTION INTRAMUSCULAR; INTRAVENOUS at 02:01

## 2019-01-18 RX ADMIN — MORPHINE SULFATE 4 MG: 4 INJECTION INTRAVENOUS at 07:01

## 2019-01-18 RX ADMIN — GLYCOPYRROLATE 0.2 MG: 0.2 INJECTION, SOLUTION INTRAMUSCULAR; INTRAVENOUS at 12:01

## 2019-01-18 RX ADMIN — FENTANYL CITRATE 100 MCG: 50 INJECTION, SOLUTION INTRAMUSCULAR; INTRAVENOUS at 12:01

## 2019-01-18 RX ADMIN — CLOSTRIDIUM TETANI TOXOID ANTIGEN (FORMALDEHYDE INACTIVATED), CORYNEBACTERIUM DIPHTHERIAE TOXOID ANTIGEN (FORMALDEHYDE INACTIVATED), BORDETELLA PERTUSSIS TOXOID ANTIGEN (GLUTARALDEHYDE INACTIVATED), BORDETELLA PERTUSSIS FILAMENTOUS HEMAGGLUTININ ANTIGEN (FORMALDEHYDE INACTIVATED), BORDETELLA PERTUSSIS PERTACTIN ANTIGEN, AND BORDETELLA PERTUSSIS FIMBRIAE 2/3 ANTIGEN 0.5 ML: 5; 2; 2.5; 5; 3; 5 INJECTION, SUSPENSION INTRAMUSCULAR at 07:01

## 2019-01-18 RX ADMIN — MIDAZOLAM HYDROCHLORIDE 3 MG: 1 INJECTION, SOLUTION INTRAMUSCULAR; INTRAVENOUS at 12:01

## 2019-01-18 RX ADMIN — HYDROMORPHONE HYDROCHLORIDE 0.4 MG: 2 INJECTION INTRAMUSCULAR; INTRAVENOUS; SUBCUTANEOUS at 02:01

## 2019-01-18 RX ADMIN — DEXAMETHASONE SODIUM PHOSPHATE 8 MG: 4 INJECTION, SOLUTION INTRAMUSCULAR; INTRAVENOUS at 12:01

## 2019-01-18 RX ADMIN — CARBOXYMETHYLCELLULOSE SODIUM 2 DROP: 2.5 SOLUTION/ DROPS OPHTHALMIC at 12:01

## 2019-01-18 RX ADMIN — LIDOCAINE HYDROCHLORIDE AND EPINEPHRINE 10 ML: 20; 10 INJECTION, SOLUTION INFILTRATION; PERINEURAL at 07:01

## 2019-01-18 RX ADMIN — ONDANSETRON 4 MG: 2 INJECTION, SOLUTION INTRAMUSCULAR; INTRAVENOUS at 12:01

## 2019-01-18 RX ADMIN — SODIUM CHLORIDE, SODIUM LACTATE, POTASSIUM CHLORIDE, AND CALCIUM CHLORIDE: 600; 310; 30; 20 INJECTION, SOLUTION INTRAVENOUS at 01:01

## 2019-01-18 RX ADMIN — ROPIVACAINE HYDROCHLORIDE 30 ML: 5 INJECTION, SOLUTION EPIDURAL; INFILTRATION; PERINEURAL at 12:01

## 2019-01-18 RX ADMIN — ACETAMINOPHEN 1000 MG: 10 INJECTION, SOLUTION INTRAVENOUS at 12:01

## 2019-01-18 RX ADMIN — PROPOFOL 120 MG: 10 INJECTION, EMULSION INTRAVENOUS at 12:01

## 2019-01-18 RX ADMIN — FENTANYL CITRATE 50 MCG: 50 INJECTION, SOLUTION INTRAMUSCULAR; INTRAVENOUS at 01:01

## 2019-01-18 NOTE — ANESTHESIA POSTPROCEDURE EVALUATION
"Anesthesia Post Evaluation    Patient: Martha Garner    Procedure(s) Performed: Procedure(s) (LRB):  ORIF, FRACTURE, RADIUS, DISTAL (Left)    Final Anesthesia Type: general  Patient location during evaluation: PACU  Patient participation: Yes- Able to Participate  Level of consciousness: awake and alert  Post-procedure vital signs: reviewed and stable  Pain management: adequate  Airway patency: patent  PONV status at discharge: No PONV  Anesthetic complications: no      Cardiovascular status: blood pressure returned to baseline  Respiratory status: unassisted and room air  Hydration status: euvolemic  Follow-up not needed.        Visit Vitals  BP (!) 131/95   Pulse (!) 114   Temp 37.1 °C (98.8 °F) (Oral)   Resp 18   Ht 5' 7" (1.702 m)   Wt 68 kg (150 lb)   SpO2 96%   BMI 23.49 kg/m²       Pain/Ivet Score: Pain Rating Prior to Med Admin: 9 (1/18/2019  9:37 AM)        "

## 2019-01-18 NOTE — ANESTHESIA PROCEDURE NOTES
Peripheral Block    Patient location during procedure: post-op    Reason for block: primary anesthetic   Diagnosis: Left distal radius fracture    Staffing  Anesthesiologist: Myron Peacock MD  Performed: anesthesiologist   Preanesthetic Checklist  Completed: patient identified, site marked, surgical consent, pre-op evaluation, timeout performed, IV checked, risks and benefits discussed and monitors and equipment checked  Peripheral Block  Patient position: supine  Prep: ChloraPrep and site prepped and draped  Patient monitoring: heart rate, cardiac monitor, continuous pulse ox and frequent blood pressure checks  Block type: infraclavicular  Laterality: left  Injection technique: single shot  Needle  Needle type: Echogenic   Needle gauge: 21 G  Needle length: 4 in  Needle localization: anatomical landmarks and ultrasound guidance   -ultrasound image captured on disc.  Assessment  Injection assessment: negative aspiration, negative parasthesia and local visualized surrounding nerve  Paresthesia pain: none  Heart rate change: no  Slow fractionated injection: yes

## 2019-01-18 NOTE — ANESTHESIA POSTPROCEDURE EVALUATION
"Anesthesia Post Evaluation    Patient: Martha Garner    Procedure(s) Performed: Procedure(s) (LRB):  ORIF, FRACTURE, RADIUS, DISTAL (Left)      Pain management: adequate                Visit Vitals  BP (!) 131/95   Pulse (!) 114   Temp 37.1 °C (98.8 °F) (Oral)   Resp 18   Ht 5' 7" (1.702 m)   Wt 68 kg (150 lb)   SpO2 96%   BMI 23.49 kg/m²       Pain/Ivet Score: Pain Rating Prior to Med Admin: 9 (1/18/2019  9:37 AM)        "

## 2019-01-18 NOTE — DISCHARGE INSTRUCTIONS
Anesthesia: After Your Surgery  Youve just had surgery. During surgery, you received medication called anesthesia to keep you comfortable and pain-free. After surgery, you may experience some pain or nausea. This is common. Here are some tips for feeling better and recovering after surgery.    Going home  Your doctor or nurse will show you how to take care of yourself when you go home. He or she will also answer your questions. Have an adult family member or friend drive you home. For the first 24 hours after your surgery:  · Do not drive or use heavy equipment.  · Do not make important decisions or sign legal documents.  · Avoid alcohol.  · Have someone stay with you, if needed. He or she can watch for problems and help keep you safe.  Be sure to keep all follow-up appointments with your doctor. And rest after your procedure for as long as your doctor tells you to.    Coping with pain  If you have pain after surgery, pain medication will help you feel better. Take it as directed, before pain becomes severe. Also, ask your doctor or pharmacist about other ways to control pain, such as with heat, ice, and relaxation. And follow any other instructions your surgeon or nurse gives you.    URINARY RETENTION  Should you experience a decrease in your urine output or are unable to urinate following surgery, this can be due to the medications given during surgery.  We recommend you going to the nearest Emergency Department.    Tips for taking pain medication  To get the best relief possible, remember these points:  · Pain medications can upset your stomach. Taking them with a little food may help.  · Most pain relievers taken by mouth need at least 20 to 30 minutes to take effect.  · Taking medication on a schedule can help you remember to take it. Try to time your medication so that you can take it before beginning an activity, such as dressing, walking, or sitting down for dinner.  · Constipation is a common side effect  of pain medications. Contact your doctor before taking any medications like laxatives or stool softeners to help relieve constipation. Also ask about any dietary restrictions, because drinking lots of fluids and eating foods like fruits and vegetables that are high in fiber can also help. Remember, dont take laxatives unless your surgeon has prescribed them.  · Mixing alcohol and pain medication can cause dizziness and slow your breathing. It can even be fatal. Dont drink alcohol while taking pain medication.  · Pain medication can slow your reflexes. Dont drive or operate machinery while taking pain medication.  If your health care provider tells you to take acetaminophen to help relieve your pain, ask him or her how much you are supposed to take each day. (Acetaminophen is the generic name for Tylenol and other brand-name pain relievers.) Acetaminophen or other pain relievers may interact with your prescription medicines or other over-the-counter (OTC) drugs. Some prescription medications contain acetaminophen along with other active ingredients. Using both prescription and OTC acetaminophen for pain can cause you to overdose. The FDA recommends that you read the labels on your OTC medications carefully. This will help you to clearly understand the list of active ingredients, dosing instructions, and any warnings. It may also help you avoid taking too much acetaminophen. If you have questions or don't understand the information, ask your pharmacist or health care provider to explain it to you before you take the OTC medication.    Managing nausea  Some people have an upset stomach after surgery. This is often due to anesthesia, pain, pain medications, or the stress of surgery. The following tips will help you manage nausea and get good nutrition as you recover. If you were on a special diet before surgery, ask your doctor if you should follow it during recovery. These tips may help:  · Dont push yourself to  eat. Your body will tell you when to eat and how much.  · Start off with clear liquids and soup. They are easier to digest.  · Progress to semi-solid foods (mashed potatoes, applesauce, and gelatin) as you feel ready.  · Slowly move to solid foods. Dont eat fatty, rich, or spicy foods at first.  · Dont force yourself to have three large meals a day. Instead, eat smaller amounts more often.  · Take pain medications with a small amount of solid food, such as crackers or toast to avoid nausea.      Call your surgeon if    · You feel too sleepy, dizzy, or groggy (medication may be too strong).  · You have side effects like nausea, vomiting, or skin changes (rash, itching, or hives).   © 5651-5565 [employer] [address] [place] [address], [place] [address]. All rights reserved. This information is not intended as a substitute for professional medical care. Always follow your healthcare professional's instructions.      FOLLOW ANY OTHER INSTRUCTIONS GIVEN TO YOU BY DR BEAVERS

## 2019-01-18 NOTE — TRANSFER OF CARE
"Anesthesia Transfer of Care Note    Patient: Martha Garner    Procedure(s) Performed: Procedure(s) (LRB):  ORIF, FRACTURE, RADIUS, DISTAL (Left)    Patient location: PACU    Anesthesia Type: general    Transport from OR: Transported from OR on 2-3 L/min O2 by NC with adequate spontaneous ventilation    Post pain: adequate analgesia    Post assessment: no apparent anesthetic complications    Post vital signs: stable    Level of consciousness: awake and alert    Nausea/Vomiting: no nausea/vomiting    Complications: none    Transfer of care protocol was followed      Last vitals:   Visit Vitals  /85   Pulse 66   Temp 37.1 °C (98.8 °F) (Oral)   Resp 18   Ht 5' 7" (1.702 m)   Wt 68 kg (150 lb)   SpO2 96%   BMI 23.49 kg/m²     "

## 2019-01-18 NOTE — OP NOTE
DATE OF PROCEDURE: 1/18/19  PREOPERATIVE DIAGNOSIS: Distal radius and ulna fracture, left  wrist.   POSTOPERATIVE DIAGNOSIS: Distal radius and ulna fracture, left wrist.   PROCEDURE PERFORMED: Open reduction and internal fixation of right distal   radius fracture.   INDICATIONS: This patient is a 67yo woman who sustained an injury the left   wrist. Radiographs have demonstrated a fracture of the distal radius with some   displacement. After the pros, cons, risks and benefits of operative   intervention were reviewed, the patient has elected to undergo the above procedure.     PROCEDURE IN DETAIL: The patient was brought to the Operating Suite, placed   supine on the operating table. General endotracheal anesthesia was administered   per the anesthesiologist without difficulty. The right upper extremity was   thoroughly prepped with alcohol and Betadine and draped in the usual sterile   fashion. After Esmarch exsanguination, the tourniquet was elevated to 250 mmHg.   A longitudinal incision was then made over the volar aspect of the left wrist   and careful dissection was carried down through the subdermal tissue using   bipolar cautery for hemostasis. The flexor carpi radialis tendon was identified   and it was retracted toward the ulnar aspect of the wrist. The pronator   quadratus was taken down from the distal radius and the fracture site was   identified, cleaned of soft tissue and periosteum and was then reduced   anatomically. Definitive fixation was then carried out using a DVR distal   radius locking plate, which was placed in appropriate position and secured to   the shaft with a bicortical screw of appropriate length. The fracture was then   reduced. Using the image intensifier, it was confirmed to be in appropriate   position and the distal peg holes were filled with a combination of smooth and   partially threaded locking pegs of appropriate length. The remainder of the   proximal holes was then filled  with bicortical screws and the tourniquet was   deflated. Hemostasis was confirmed. The image intensifier confirmed again in   the AP and lateral planes appropriate position of the fracture and hardware.   The wound was then closed in layers with Vicryl and nylon suture. A sterile   soft dressing and volar splint was then applied. The patient was awakened in   the Operating Suite and taken to the recovery area in stable condition. She   tolerated the procedure very well. Lap, instrument and needle counts were   correct at the end of the procedure x2.   BLOOD LOSS: Minimal.   COMPLICATIONS: None.     Claude S. Williams, MD

## 2019-01-18 NOTE — PLAN OF CARE
Martha Colbymofiona has met all discharge criteria from Phase II. Vital Signs are stable, ambulating  without difficulty.Pain is now under control and tolerable for the pt. Pain score 4 at this time.  Discharge instructions given, patient verbalized understanding. Discharged from facility via wheelchair in stable condition.

## 2019-01-18 NOTE — H&P
[facility]  History & Physical    Subjective:      Chief Complaint/Reason for Admission: 67 yo woman fall on outstretched hand today. Also scalp laceration. Tripped over her dog at home.    Martha Garner is a 68 y.o. female.  [facility]. H/o osteoporosis, HTN, back pain.  Scalp sutured in ED, CT of head performed. Complains of left wrist, scalp pain only.   No neck pain, no chest pain, no sob    Past Medical History:   Diagnosis Date    Abnormal Pap smear     CRY[facility]     Anxiety     Depression     History of psychiatric hospitalization     Hx of psychiatric care     Hypocalcemia 9/25/2016    Psychiatric exam requested by authority     Psychiatric problem     Therapy      Past Surgical History:   Procedure Laterality Date    breast reduction  2000    CRYOTHERAPY      gastic bypass   2007     Family History   Problem Relation Age of Onset    Alcohol abuse Father     Hypertension Mother     Depression Mother     Hypertension Brother     Obesity Brother     Kidney disease Brother     Alcohol abuse Brother     Hypertension Sister     Obesity Sister     Kidney disease Sister     Alcohol abuse Sister     Alcohol abuse Sister     Alcohol abuse Cousin     Breast cancer Neg Hx     Colon cancer Neg Hx     Ovarian cancer Neg Hx      Social History     Tobacco Use    Smoking status: Never Smoker    Smokeless tobacco: Never Used   Substance Use Topics    Alcohol use: No    Drug use: No         (Not in a hospital admission)  Review of patient's allergies indicates:  No Known Allergies     ROS    Objective:      Vital Signs (Most Recent)  Temp: 98.8 °F (37.1 °C) (01/18/19 1118)  Pulse: 66 (01/18/19 1107)  Resp: 18 (01/18/19 0651)  BP: 133/85 (01/18/19 1117)  SpO2: 96 % (01/18/19 1107)    Vital Signs Range (Last 24H):  Temp:  [98.6 °F (37 °C)-98.8 °F (37.1 °C)]   Pulse:  [64-80]   Resp:  [18]   BP: ()/(69-85)   SpO2:  [96 %-97 %]     Physical Exam   Alert and  appropriate  Neck supple  Chest: breathing clearly bilaterally unlabored  CV: Normal rate and rhythm  Scalp laceration sutured  Left Wrist splint intact, tender, deformity  Moving digits, intact sensibility, intact circulation      Data Review:  Radiology review: left distal radius and ulna fracture with displacement, comminution   ECG: no prior ECG.     Assessment:      Active Hospital Problems    Diagnosis  POA    Closed fracture of left distal radius [S52.502A]  Yes      Resolved Hospital Problems   No resolved problems to display.       Plan:    Options discussed. R&B reviewec. Plan left wrist ORIF

## 2019-01-18 NOTE — BRIEF OP NOTE
[facility]  Brief Operative Note     SUMMARY     Surgery Date: 1/18/2019     Surgeon(s) and Role:     * Claude S. Williams IV, MD - Primary    Assisting Surgeon: None    Pre-op Diagnosis:  Wrist fracture [S62.109A]    Post-op Diagnosis:  Post-Op Diagnosis Codes:     * Wrist fracture [S62.109A]    Procedure(s) (LRB):  ORIF, FRACTURE, RADIUS, DISTAL (Left)    Anesthesia: General    Description of the findings of the procedure: Left distal radius and ulna comminuted intraarticular fracture    Findings/Key Components: as above    Estimated Blood Loss: * No values recorded between 1/18/2019 12:55 PM and 1/18/2019  1:54 PM *         Specimens:   Specimen (12h ago, onward)    None          Discharge Note    SUMMARY     Admit Date: 1/18/2019    Discharge Date and Time:  01/18/2019 1:55 PM    Hospital Course (synopsis of major diagnoses, care, treatment, and services provided during the course of the hospital stay): uneventful     Final Diagnosis: Post-Op Diagnosis Codes:     * Wrist fracture [S62.109A]    Disposition: Home or Self Care    Follow Up/Patient Instructions:     Medications:  Reconciled Home Medications:      Medication List      START taking these medications    HYDROcodone-acetaminophen 5-325 mg per tablet  Commonly known as:  NORCO  Take 1 tablet by mouth every 6 (six) hours as needed for Pain.        CONTINUE taking these medications    calcium citrate 200 mg (950 mg) tablet  Commonly known as:  CALCITRATE  Take 3 tablets (600 mg total) by mouth 3 (three) times daily with meals.     clonazePAM 1 MG tablet  Commonly known as:  KLONOPIN  Take 0.5-1 tablets (0.5-1 mg total) by mouth every evening.     disulfiram 250 mg tablet  Commonly known as:  ANTABUSE  Take 1 tablet (250 mg total) by mouth once daily.     escitalopram oxalate 20 MG tablet  Commonly known as:  LEXAPRO  Take 1 tablet (20 mg total) by mouth once daily.     gabapentin 300 MG capsule  Commonly known as:  NEURONTIN  Take 1  capsule (300 mg total) by mouth 3 (three) times daily as needed.     OLANZapine 7.5 MG tablet  Commonly known as:  ZyPREXA  Take 1 tablet (7.5 mg total) by mouth every evening.          Discharge Procedure Orders   Diet general     Call MD for:  temperature >100.4     Call MD for:  persistent nausea and vomiting     Call MD for:  severe uncontrolled pain     Call MD for:  difficulty breathing, headache or visual disturbances     Call MD for:  redness, tenderness, or signs of infection (pain, swelling, redness, odor or green/yellow discharge around incision site)     Call MD for:  hives     Call MD for:  persistent dizziness or light-headedness     Call MD for:  extreme fatigue     Leave dressing on - Keep it clean, dry, and intact until clinic visit     Keep surgical extremity elevated     Lifting restrictions     No driving, operating heavy equipment or signing legal documents while taking pain medication     Follow-up Information     Claude S. Williams Iv, MD In 1 week.    Specialty:  Orthopedic Surgery  Why:  For wound re-check  Contact information:  [address]  [facility] [id number] [phone]

## 2019-01-18 NOTE — ED NOTES
Trip and fall prior to arrival, laceration to L brow and deformity L wrist. CMS intact    LOC: The patient is awake, alert and aware of environment with an appropriate affect, the patient is oriented x 3 and speaking appropriately.  APPEARANCE: Patient resting comfortably and in no acute distress, patient is clean and well groomed, patient's clothing is properly fastened.  SKIN: The skin is warm and dry, patient has normal skin turgor and moist mucus membranes.  MUSKULOSKELETAL: Patient moving all extremities well, no obvious swelling or deformities noted.  RESPIRATORY: Airway is open and patent, respirations are spontaneous, patient has a normal effort and rate. Breath sounds are clear and equal bilaterally.    ABDOMEN: Soft and non tender to palpation, no distention noted. Bowel sounds present.

## 2019-01-18 NOTE — ANESTHESIA PREPROCEDURE EVALUATION
01/18/2019  Martha Garner is a 68 y.o., female.    Anesthesia Evaluation    I have reviewed the Patient Summary Reports.    I have reviewed the Nursing Notes.   I have reviewed the Medications.     Review of Systems  Anesthesia Hx:  No problems with previous Anesthesia  Denies Family Hx of Anesthesia complications.   Denies Personal Hx of Anesthesia complications.   Social:  Non-Smoker    Hematology/Oncology:  Hematology Normal   Oncology Normal     Cardiovascular:   Hypertension    Pulmonary:  Pulmonary Normal    Renal/:  Renal/ Normal     Hepatic/GI:  Hepatic/GI Normal    Musculoskeletal:   Low back pain Spine Disorders: lumbar Degenerative disease    Neurological:  Neurology Normal    Endocrine:  Endocrine Normal        Physical Exam  General:  Well nourished    Airway/Jaw/Neck:  Airway Findings: Mouth Opening: Normal Tongue: Normal  General Airway Assessment: Adult  Mallampati: II  TM Distance: Normal, at least 6 cm         Dental:  Dental Findings: In tact             Anesthesia Plan  Type of Anesthesia, risks & benefits discussed:  Anesthesia Type:  general  Patient's Preference:   Intra-op Monitoring Plan: standard ASA monitors  Intra-op Monitoring Plan Comments:   Post Op Pain Control Plan: multimodal analgesia  Post Op Pain Control Plan Comments:   Induction:   IV  Beta Blocker:         Informed Consent: Patient understands risks and agrees with Anesthesia plan.  Questions answered. Anesthesia consent signed with patient.  ASA Score: 3     Day of Surgery Review of History & Physical:    H&P update referred to the surgeon.         Ready For Surgery From Anesthesia Perspective.

## 2019-01-18 NOTE — ANESTHESIA PROCEDURE NOTES
Peripheral Block    Patient location during procedure: holding area   Block not for primary anesthetic.  Reason for block: at surgeon's request and post-op pain management   Post-op Pain Location: Orif Left radius  Timeout: 1/18/2019 12:25 PM   End time: 1/18/2019 12:34 PM  Staffing  Anesthesiologist: Myron Peacock MD  Performed: anesthesiologist   Preanesthetic Checklist  Completed: patient identified, site marked, surgical consent, pre-op evaluation, timeout performed, IV checked, risks and benefits discussed and monitors and equipment checked  Peripheral Block  Patient position: supine  Prep: ChloraPrep and site prepped and draped  Patient monitoring: heart rate, cardiac monitor, continuous pulse ox and frequent blood pressure checks  Block type: infraclavicular  Laterality: left  Injection technique: single shot  Needle  Needle type: Echogenic   Needle gauge: 21 G  Needle length: 4 in  Needle localization: anatomical landmarks and ultrasound guidance   -ultrasound image captured on disc.  Assessment  Injection assessment: negative aspiration, negative parasthesia and local visualized surrounding nerve  Paresthesia pain: none  Heart rate change: no  Slow fractionated injection: yes

## 2019-01-18 NOTE — ED PROVIDER NOTES
Encounter Date: 1/18/2019    SCRIBE #1 NOTE: Kamla BECERRA am scribing for, and in the presence of, Dr. Braun.       History     Chief Complaint   Patient presents with    Fall     trip and fall over dog resulting in laceration to L temple and L wrist deformity and pain. unable to move L wrist. normal temp, color, sensation. 2+ pulses     Time seen by provider: 7:06 AM    This is a 68 y.o. female with hx of HTN who presents due to fall this morning. Pt reports trip and fall, landing onto her outstretched L hand. Pt has pain to the L wrist. She has pain and a wound to the L temple. She reports no other pain. She denies LOC. She denies numbness, weakness, tingling, dizziness, lightheadedness, HA, back pain, neck pain, N/V, photophobia, visual disturbance, hematuria, facial swelling, dental pain, and epistaxis. No recent illness. Pt's tetanus is not UTD. Pt is not on any anticoagulants. Pt states that she has not eaten this morning.      The history is provided by the patient.     Review of patient's allergies indicates:  No Known Allergies  Past Medical History:   Diagnosis Date    Abnormal Pap smear     [facility]     Anxiety     Depression     History of psychiatric hospitalization     Hx of psychiatric care     Hypocalcemia 9/25/2016    Psychiatric exam requested by authority     Psychiatric problem     Therapy      Past Surgical History:   Procedure Laterality Date    breast reduction  2000    CRYOTHERAPY      gastic bypass   2007     Family History   Problem Relation Age of Onset    Alcohol abuse Father     Hypertension Mother     Depression Mother     Hypertension Brother     Obesity Brother     Kidney disease Brother     Alcohol abuse Brother     Hypertension Sister     Obesity Sister     Kidney disease Sister     Alcohol abuse Sister     Alcohol abuse Sister     Alcohol abuse Cousin     Breast cancer Neg Hx     Colon cancer Neg Hx     Ovarian cancer Neg Hx      Social History      Tobacco Use    Smoking status: Never Smoker    Smokeless tobacco: Never Used   Substance Use Topics    Alcohol use: No    Drug use: No     Review of Systems   Constitutional: Negative for chills and fever.   HENT: Negative for congestion, dental problem, drooling, facial swelling, nosebleeds and sore throat.    Eyes: Negative for photophobia, redness and visual disturbance.   Respiratory: Negative for cough and shortness of breath.    Cardiovascular: Negative for chest pain.   Gastrointestinal: Negative for abdominal pain, diarrhea, nausea and vomiting.   Genitourinary: Negative for dysuria, hematuria and urgency.   Musculoskeletal: Negative for back pain and neck pain.        Positive for L wrist pain.   Skin: Positive for wound (over L temple). Negative for rash.   Neurological: Negative for dizziness, syncope, weakness, light-headedness, numbness and headaches.        Negative for tingling.   Psychiatric/Behavioral: Negative for confusion.       Physical Exam     Initial Vitals [01/18/19 0651]   BP Pulse Resp Temp SpO2   99/69 77 18 98.6 °F (37 °C) 96 %      MAP       --         Physical Exam    Nursing note and vitals reviewed.  Constitutional: She appears well-developed and well-nourished. She is not diaphoretic. No distress.   HENT:   Head: Normocephalic.   Right Ear: External ear normal.   Left Ear: External ear normal.   3.5cm laceration above the L eyebrow. No facial bony tenderness.    Eyes: Conjunctivae and EOM are normal. Pupils are equal, round, and reactive to light. Right eye exhibits no discharge. Left eye exhibits no discharge.   Neck: Normal range of motion. Neck supple.   Cardiovascular: Normal rate, regular rhythm and normal heart sounds.   Pulses:       Radial pulses are 2+ on the left side.   Normal S1, S2. No murmurs, no rubs, no gallops.    Pulmonary/Chest: Breath sounds normal. No respiratory distress. She has no wheezes. She has no rhonchi. She has no rales.   Abdominal: Soft. Bowel  sounds are normal. She exhibits no distension. There is no tenderness. There is no rebound and no guarding.   Musculoskeletal: Normal range of motion. She exhibits no edema.   No midline C-T-L-spine tenderness to palpation, crepitus or step-offs. Deformity and tenderness noted to the L wrist.   Lymphadenopathy:     She has no cervical adenopathy.   Neurological: She is alert and oriented to person, place, and time. No cranial nerve deficit or sensory deficit.   LUE: Sensation intact to light touch. Strength limited secondary to pain.  BLE with 5/5 strength.   Skin: Skin is warm and dry. No rash noted. No erythema.   LUE capillary refill <2 seconds.   Psychiatric: She has a normal mood and affect. Her behavior is normal.         ED Course   Lac Repair  Date/Time: 1/18/2019 9:19 AM  Performed by: Eliezer Montana MD  Authorized by: Eliezer Montana MD   Body area: head/neck (above L eyebrow)  Laceration length: 3.5 cm  Anesthesia: local infiltration    Anesthesia:  Local Anesthetic: lidocaine 2% with epinephrine  Irrigation solution: saline  Irrigation method: syringe  Amount of cleaning: standard  Skin closure: Ethilon (4-0)  Number of sutures: 12  Suture technique: simple interrupted.  Approximation: close  Approximation difficulty: complex  Patient tolerance: Patient tolerated the procedure well with no immediate complications    Splint Application  Date/Time: 1/18/2019 8:29 AM  Performed by: Eliezer Montana MD  Authorized by: Eliezer Montana MD   Location details: left wrist  Splint type: volar.  Supplies used: fiberglass.  Post-procedure: The splinted body part was neurovascularly unchanged following the procedure.  Patient tolerance: Patient tolerated the procedure well with no immediate complications  Comments: NV intact pre-procedure.        Labs Reviewed          Imaging Results          CT Cervical Spine Without Contrast (Final result)  Result time 01/18/19 07:31:46    Final result by Andrea PINTO  MD Claudio (01/18/19 07:31:46)                 Impression:      No evidence of acute intracranial pathology.    Mild chronic microvascular ischemic disease.    Mild cervical spondylosis without evidence of fracture or traumatic malalignment.      Electronically signed by: Andrea Snyder MD  Date:    01/18/2019  Time:    07:31             Narrative:    EXAMINATION:  CT HEAD WITHOUT CONTRAST; CT CERVICAL SPINE WITHOUT CONTRAST    CLINICAL HISTORY:  Trauma;    TECHNIQUE:  Low dose axial CT images obtained throughout the head and cervical spine without intravenous contrast.  Axial, sagittal and coronal reconstructions were performed.    COMPARISON:  None.    FINDINGS:  Head:    Ventricles and sulci are normal in size for age without evidence of hydrocephalus.    No extra-axial blood or fluid collections.    Mild chronic microvascular ischemic change in the supratentorial white matter. No parenchymal mass, hemorrhage, edema or major vascular distribution infarct.    No fracture. Mastoid air cells and paranasal sinuses are essentially clear.    Spine:    The vertebral bodies are normal in height and morphology without evidence of fracture.    Normal sagittal alignment.  No spondylolisthesis.    Mild degenerative change without evidence of bony spinal canal stenosis.    Limited evaluation of the intraspinal contents demonstrates no evidence of hematoma.    The paraspinal soft tissue structures exhibit no acute abnormalities.                               CT Head Without Contrast (Final result)  Result time 01/18/19 07:31:46    Final result by Andrea Snyder MD (01/18/19 07:31:46)                 Impression:      No evidence of acute intracranial pathology.    Mild chronic microvascular ischemic disease.    Mild cervical spondylosis without evidence of fracture or traumatic malalignment.      Electronically signed by: Andrea Snyder MD  Date:    01/18/2019  Time:    07:31             Narrative:    EXAMINATION:  CT HEAD  WITHOUT CONTRAST; CT CERVICAL SPINE WITHOUT CONTRAST    CLINICAL HISTORY:  Trauma;    TECHNIQUE:  Low dose axial CT images obtained throughout the head and cervical spine without intravenous contrast.  Axial, sagittal and coronal reconstructions were performed.    COMPARISON:  None.    FINDINGS:  Head:    Ventricles and sulci are normal in size for age without evidence of hydrocephalus.    No extra-axial blood or fluid collections.    Mild chronic microvascular ischemic change in the supratentorial white matter. No parenchymal mass, hemorrhage, edema or major vascular distribution infarct.    No fracture. Mastoid air cells and paranasal sinuses are essentially clear.    Spine:    The vertebral bodies are normal in height and morphology without evidence of fracture.    Normal sagittal alignment.  No spondylolisthesis.    Mild degenerative change without evidence of bony spinal canal stenosis.    Limited evaluation of the intraspinal contents demonstrates no evidence of hematoma.    The paraspinal soft tissue structures exhibit no acute abnormalities.                               X-Ray Wrist Complete Left (Final result)     Abnormal  Result time 01/18/19 07:20:29    Final result by Andrea Snyder MD (01/18/19 07:20:29)                 Impression:      Acute appearing fractures of the distal radius and ulna as above.    This report was flagged in [employer] as abnormal.      Electronically signed by: Andrea Snyder MD  Date:    01/18/2019  Time:    07:20             Narrative:    EXAMINATION:  XR WRIST COMPLETE 3 VIEWS LEFT    CLINICAL HISTORY:  Injury, unspecified, initial encounter    TECHNIQUE:  PA, lateral, and oblique views of the left wrist were performed.    COMPARISON:  None    FINDINGS:  Acute appearing fracture of the distal radius.  Mild impaction with notable dorsal positioning and angulation of the distal fragment.  Articulation of the distal fragment with the carpal bones likely remains intact, both of  which appear dorsally displaced relative to the ulnar radial shaft.  There is also a displaced acute appearing fracture of the ulnar styloid process.                              X-Rays:   Independently Interpreted Readings:   Other Readings:  X-Ray Wrist Complete Left: Distal radial fracture with displacement and avulsed ulnar styloid process.    Medical Decision Making:   Independently Interpreted Test(s):   I have ordered and independently interpreted X-rays - see prior notes.  Clinical Tests:   Radiological Study: Ordered and Reviewed  ED Management:  8:07 AM - Consulted and discussed pt with Dr. Claude Williams, ortho, who recommends volar splint for care and immobilization. He will take pt to OR today.  Other:   I have discussed this case with another health care provider.            Scribe Attestation:   Scribe #1: I performed the above scribed service and the documentation accurately describes the services I performed. I attest to the accuracy of the note.    Attending Attestation:           Physician Attestation for Scribe:  Physician Attestation Statement for Scribe #1: I, Dr. Braun, reviewed documentation, as scribed by Kamla Biggs in my presence, and it is both accurate and complete.         Attending ED Notes:   Emergent evaluation a 68-year-old female with left wrist pain and head pain status post trauma. No LOC.  No nausea or vomiting.  No confusion.  GCS of 15.  Patient is afebrile, nontoxic-appearing stable vital signs except for elevation of blood pressure.  Patient is neurovascularly intact without focal neurologic deficits.  No midline C-spine, T-spine or L-spine tenderness to palpation, crepitus or step-offs.  No acute findings on CT of the head.  No acute findings on CT of the cervical spine.  Laceration to forehead is sutured without complication.  X-ray of the left wrist reveals mild impaction of the radius with dorsal angulation of the distal fragment.  Patient also found to have  fracture of the ulnar styloid process.  I consulted and discussed patient with Dr. Claude Williams who recommended a volar splint and patient will go to the OR in the afternoon.  Volar splint was placed without complication.  Patient is neurovascularly intact pre and post splint placement.  The patient is extensively counseled on her diagnosis and treatment including all diagnostic and physical exam findings.  The patient is admitted in stable condition.             Clinical Impression:     1. Other closed fracture of distal end of left radius, initial encounter    2. Trauma    3. Closed displaced fracture of styloid process of left ulna, initial encounter    4. Laceration of forehead, initial encounter    5. Injury of head, initial encounter    6. Other closed intra-articular fracture of distal end of left radius, initial encounter                                 Eliezer Montana MD  01/18/19 1203

## 2019-01-23 ENCOUNTER — OFFICE VISIT (OUTPATIENT)
Dept: PSYCHIATRY | Facility: CLINIC | Age: 69
End: 2019-01-23
Payer: COMMERCIAL

## 2019-01-23 VITALS
HEIGHT: 67 IN | BODY MASS INDEX: 23.49 KG/M2 | HEART RATE: 89 BPM | DIASTOLIC BLOOD PRESSURE: 67 MMHG | SYSTOLIC BLOOD PRESSURE: 126 MMHG

## 2019-01-23 DIAGNOSIS — F31.9 BIPOLAR I DISORDER, CURRENT EPISODE DEPRESSED: Primary | ICD-10-CM

## 2019-01-23 DIAGNOSIS — F11.11: ICD-10-CM

## 2019-01-23 DIAGNOSIS — F10.21 ALCOHOL USE DISORDER, SEVERE, IN EARLY REMISSION: ICD-10-CM

## 2019-01-23 PROCEDURE — 99999 PR PBB SHADOW E&M-EST. PATIENT-LVL III: ICD-10-PCS | Mod: PBBFAC,,, | Performed by: PSYCHIATRY & NEUROLOGY

## 2019-01-23 PROCEDURE — 99214 PR OFFICE/OUTPT VISIT, EST, LEVL IV, 30-39 MIN: ICD-10-PCS | Mod: S$GLB,,, | Performed by: PSYCHIATRY & NEUROLOGY

## 2019-01-23 PROCEDURE — 99999 PR PBB SHADOW E&M-EST. PATIENT-LVL III: CPT | Mod: PBBFAC,,, | Performed by: PSYCHIATRY & NEUROLOGY

## 2019-01-23 PROCEDURE — 99214 OFFICE O/P EST MOD 30 MIN: CPT | Mod: S$GLB,,, | Performed by: PSYCHIATRY & NEUROLOGY

## 2019-01-23 RX ORDER — ESCITALOPRAM OXALATE 10 MG/1
15 TABLET ORAL DAILY
Qty: 45 TABLET | Refills: 5 | Status: SHIPPED | OUTPATIENT
Start: 2019-01-23 | End: 2019-02-18

## 2019-01-23 RX ORDER — OLANZAPINE 5 MG/1
5 TABLET ORAL NIGHTLY
Qty: 30 TABLET | Refills: 11 | Status: SHIPPED | OUTPATIENT
Start: 2019-01-23 | End: 2019-02-18

## 2019-01-23 RX ORDER — OLANZAPINE 5 MG/1
7.5 TABLET ORAL NIGHTLY
Qty: 30 TABLET | Refills: 5 | Status: SHIPPED | OUTPATIENT
Start: 2019-01-23 | End: 2019-01-23

## 2019-01-23 NOTE — PROGRESS NOTES
Ambulatory Psychiatry Established Patient Follow-up Note      Chief Complaint  presents for followup of depression, alcohol use disorder    Time Spent  30 minutes    People Present  Patient     Interval Hx  Denies feeling depressed, reports boredom. Not walking. Worries about her salvation, thinks about it all day. Occasionally able to get mind off of it and enjoy things. Expecting first grandchild in July. Fell and broke arm when she tripped over her dog 5 days ago, also cut forehead and required stitches. Denies IOR or delusions or hallucinations. Denies SI. Sleeping 6 or 7 hours each night. At times 8 hours.    Presribed   ROS   Complete review of systems performed covering Constitutional, Eyes, ENT/Mouth, Cardiovascular, Respiratory, Gastrointestinal, Genitourinary, Musculoskeletal, Skin, Neurologic, Endocrine, and Allergy/Immune. Intermittent back pain. All other systems were negative.    Psych ROS covered elsewhere in note (HPI)    PFSH  Past Medical History reviewed: Yes  Family History reviewed: No  Social History reviewed: Yes  Medications/problem list/allergies reviewed: Yes    Medications  zyprexa 10  mg at bedtime.  Klonopin 0.5 mg qhs  Lexapro 15 mg daily    Allergies  Review of patient's allergies indicates:  No Known Allergies    EXAM  VITALS     RELEVANT LABS/STUDIES:  Lithium level   Order: [id number]   Status:  Final result Visible to patient:  No (Not Released) Next appt:  None         Ref Range & Units 13d ago  (9/24/16) 3wk ago  (9/10/16) 1mo ago  (9/5/16) 1mo ago  (9/3/16)    Lithium Lvl 0.6 - 1.2 mmol/L 0.7 0.8 0.5 (L) 0.4 (L)   Resulting Agency  [facility]      Specimen Collected: 09/24/16  9:48 AM Last Resulted: 09/24/16 10:25 AM Lab Flowsheet Order Details View Encounter Lab and Collection Details Routing Result History           TSH   Order: [id number]   Status:  Final result Visible to patient:  No (Not Released) Next appt:  None         Ref Range & Units 13d ago   1mo ago   5yr  ago      TSH 0.400 - 4.000 uIU/mL 0.997 0.868 1.02R   Resulting Agency  [facility]      Specimen Collected: 09/24/16  2:05 PM Last Resulted: 09/24/16  3:13 PM Lab Flowsheet Order Details View Encounter Lab and Collection Details Routing Result              PTH, intact   Order: [id number]   Status:  Final result Visible to patient:  No (Not Released) Next appt:  None      Ref Range & Units 13d ago     PTH, Intact 9.0 - 77.0 pg/mL 148.0 (H)   Resulting Agency  [facility]      Specimen Collected: 09/24/16  2:05 PM Last Resulted: 09/24/16  2:58 PM Lab Flowsheet Order Details View Encounter Lab and Collection Details Routing Result History           CBC auto differential   Order: [id number]   Status:  Final result Visible to patient:  No (Not Released) Next appt:  None         Ref Range & Units 12d ago   13d ago   1mo ago   5yr ago      WBC 3.90 - 12.70 K/uL 11.70 15.14 (H) 6.52 4.68 (L)R    RBC 4.00 - 5.40 M/uL 4.00 4.57 4.51 4.22    Hemoglobin 12.0 - 16.0 g/dL 10.1 (L) 11.8 (L) 10.3 (L) 13.0R    Hematocrit 37.0 - 48.5 % 33.1 (L) 37.2 35.4 (L) 37.7    MCV 82 - 98 fL 83 81 (L) 79 (L) 89.3R    MCH 27.0 - 31.0 pg 25.3 (L) 25.8 (L) 22.8 (L) 30.8R    MCHC 32.0 - 36.0 % 30.5 (L) 31.7 (L) 29.1 (L) 34.5R    RDW 11.5 - 14.5 % 21.6 (H) 21.0 (H) 16.7 (H) 12.6    Platelets 150 - 350 K/uL 222 244 276 224    MPV 9.2 - 12.9 fL 10.4 10.1 9.4 10.2    Gran # 1.8 - 7.7 K/uL 9.7 (H) 13.6 (H) 4.6 1.9    Lymph # 1.0 - 4.8 K/uL 1.2 0.7 (L) 1.1 2.0R    Mono # 0.3 - 1.0 K/uL 0.6 0.7 0.6 0.4R    Eos # 0.0 - 0.5 K/uL 0.2 0.0 0.2 0.3R    Baso # 0.00 - 0.20 K/uL 0.03 0.02 0.02 0.0R    Gran% 38.0 - 73.0 % 82.8 (H) 90.1 (H) 70.1 41.4R    Lymph% 18.0 - 48.0 % 10.5 (L) 4.8 (L) 16.9 (L) 42.3R    Mono% 4.0 - 15.0 % 4.9 4.6 9.0 9.4 (H)R    Eosinophil% 0.0 - 8.0 % 1.3 0.1 3.5 6.0 (H)R    Basophil% 0.0 - 1.9 % 0.3 0.1 0.3 0.9R    Differential Method  Automated Automated Automated    Resulting Agency  [facility]      Specimen Collected: 09/25/16   5:44 AM Last Resulted: 09/25/16  7:26 AM Lab Flowsheet Order Details View Encounter Lab and Collection Details Routing Result History      R=Reference range differs from displayed range             Basic metabolic panel   Order: [id number]   Status:  Final result Visible to patient:  No (Not Released) Next appt:  None         Ref Range & Units 12d ago   13d ago   3wk ago   1mo ago      Sodium 136 - 145 mmol/L 142 141 140 144    Potassium 3.5 - 5.1 mmol/L 3.7 4.0CM 4.1 4.3    Chloride 95 - 110 mmol/L 113 (H) 108 112 (H) 111 (H)    CO2 23 - 29 mmol/L 23 22 (L) 21 (L) 26    Glucose 70 - 110 mg/dL 113 (H) 126 (H) 177 (H) 98    BUN, Bld 8 - 23 mg/dL 15 18 11 20    Creatinine 0.5 - 1.4 mg/dL 0.7 0.8 0.8 0.7    Calcium 8.7 - 10.5 mg/dL 7.9 (L) 8.4 (L) 8.6 (L) 9.1    Anion Gap 8 - 16 mmol/L 6 (L) 11 7 (L) 7 (L)    eGFR if African American >60 mL/min/1.73 m^2 >60.0 >60.0 >60.0 >60.0    eGFR if non African American >60 mL/min/1.73 m^2 >60.0 >60.0CM >60.0CM >60.0CM   Comments: Calculation used to obtain the estimated glomerular filtration   rate (eGFR) is the CKD-EPI equation. Since race is unknown   in our information system, the eGFR values for   -American and Non--American patients are given   for each creatinine result.      Resulting Agency  [facility]      Specimen Collected: 09/25/16  5:44 AM Last Resulted: 09/25/16  6:22 AM Lab Flowsheet Order Details View Encounter Lab and Collection Details Routing Result History      CM=Additional comments             BAL checked with breathalyzer during appointment was 0.128    PSYCHIATRIC EXAMINATION  Appearance: well groomed, appearing  stated age, normal weight  Behavior: cooperative, psychomotor retardation  Speech: normal rate and amount  Mood: depressed  Affect: blunted  Thought Process: linear   Thought Content: negative for delusions or suicidal ideations or hallucintaitons.   Associations: intact  Memory: grossly intact.  Level of  Consciousness/Orientation: grossly intact  Fund of Knowledge: good  Attention: fair  Language: fluent, naming intact  Insight: fair  Judgment: fair    Neurological signs: no involuntary movements or tremor  Gait: normal    Medical Decision Making    IMPRESSION   65 yo  retired woman with past psych hx significant for seasonal depressive episodes, anxiety and alcohol use disorder in sustained remission, off antidepressants for past several years, now presenting with one month hx of depressed mood associated with hyperreligious obsessions. Patient admitted to [facility] for treatment of major depressive episode with psychosis and had partial response to combination of lexapro 20 mg and zyprexa 7.5 mg at bedtime. Pt on follow up on 8/11 denied current hyperreligiousity or obsessiveness but still reports depression. Continued on lexapro 20 mg and increased dose of zyprexa 10 mg, pt returned less than one week later with several days of hyperactivity, severe insomnia and restlessness with euthymic mood, on exam affect bright and mildly expansive. On review of remote records, patient with periods of elevated mood possibly coincing with past episodes of substance use, prior diagnosis of bipolar disorder for periods of insomnia and hyperactivity. +Family hx (daughter with bipolar), as well as mixed features to prior depressions (delusions, hyperreligiousity, obsessiveness) consistent with bipolar II disorder. Then reduced lexapro to 10 mg, started on klonopin for sleep and continued zyprexa at 10 mg. Patient returned one week later on 8/25 with lower mood, variable energy but still appearing  mildly agitated, having incongruent affect and hyperreligious on exam. Of note pt had decreased zyprexa fromo 10 mg to 3.75 mg several days before due to concern for mild transaminitis and lower energy. Attempted crosstaper of zyprexa to risperdal but patient resumed zyprexa due to continued extremes mood swings and insomnia. Continued  to taper off lexapro. Patient returned 2 weeks ago dysphoric and suicidal as well as with psychomotor agitation, increased goal directed activity and hyperreligiousity. Concerned for risk to self given severity of despair, expressed desire to die, Catholic conviction and agitation. Admitted to [facility], where sx stabilized with addition of lithium. Patient without any further suicidal ideation, delusions or mixed sx, but now reporting apathy and sustained depression, also complaining of some cognitive effects and blurry vision that she ties to increase in lithium dose. Reduced lithium from 750 mg to 600 mg while keeping other meds the same. Pt returned today reporting euthymia and fairly stable mood apart from some overshopping, but states this is achronic impulse not clearly related to mood. Continued patient on lithium 600 mg, zyprexa 10, lexapro 5 and klonopin 0.5-1 for 2 weeks. Per  patient has been stable, however patient reporting some dysphoria. Started patient on lamictal and tapering off zyprexa and lexapro, patient reporting good mood, denying depression or manic sx, appearing euthymic. However patient returned 2 weeks later in mid November with high anxiety, insomnia, suggestive of mixed hypomanic state given high level of activity, mild impulsivity and frequent good but labile mood. Started pateint back on zyprexa 2.5 mg qhs with good effect, calmer and euthymic but still with anxiety, obsessive thoughts and insomnia. Increased lamictal to target anxiety, pt increased to 75 mg only rather than 100 mg due to misunderstanding. Has been mostly stable but with periodic breakthroughs of insomnia, depressed mood and racing thoughts which have responded to lamictal increases and temporary increases in zyprexa. Mood in general has been more positive with higher doses of lamictal, but still with breakthrough periods of anxiety, dypshoria and agitation. Due to clear responses to zyprexa and lamictal but not to  lithium other than possible help with mood stabilization, have increased zyprexa dose to 7.5 mg qhs permanently and reduced dose of lithium to 450 mg qhs. Patient has continued to have depression with obsessive ruminations without clear evidence of stone or mixed state currently. Started patient on lexapro 2.5 mg daily with mild benefit to mood, no sign currently of activation. Gradually increased to 10 mg daily with resulting improvement in depression. Pt returned for follow up in summer 2017 reporting euthymia and stable mood, with only mild Zoroastrianism ruminations about salvation (chronic but much improved today) and insomnia which responds to combination of zyprexa and klonopin. Subsequently tapered off of lithium. Pt returned in FAll 2017 with worsened mood, mild depressive sx, after which we increased her lexapro to 15 mg. She returned in Spring 2018 for follow up after a period of apparent stability today frankly intoxicated with BAL above legal limit, having driven to office, reporting daily heavy use of alcohol and occasional abuse of tramadol.  Of note has been planning a last minute wedding and patient has history of stone/mixed states in the Spring, raising concern for bipolar disorder precipitating her relapse. Patient referred to [facility] for alcohol use disorder and opioid abuse, completed at end of June 2018. Has subsequently been sober but mood has gradually declined per patient report. On exam appeared blunted, mildly psychomotor retarded and with recent weight gain. Appeared to be in mild-moderate depressive episode.Reduced zyprexa. Pt returns for follow up today reporting continued mild depression and return of ruminations but reports mood being better than previously. Wishes to reduce zyprexa further due to weight gain.     DIAGNOSES  Alcohol use Disorder, severe, in early remission  Bipolar Disorder I, most recent episode depressed.  Opioid Use disorder, moderate, in early remission.    PLAN  -agreed  to reduce zyprexa to 5  mg qhs. Patient appears more depressed and has had weight gain. May be overmedicated and energy may improve with lower dose of zyprexa. Discussed how weight gain and low energy could be related to zyprexa.  -reducing lexapro to 15 mg daily in order to minimize effect of unopposed ssri.   -encouraged her to resume exercise, walking daily for one hour, which should help with weight gain and mood.   -recommended that she follow up with PCP for blood pressure monitoring and to check fasting glucose and lipids.  -encouraged her to consider switch to latuda or vraylar at this time but patient is resistant.  -  Patient particularly responsive to lexapro and zyprexa combination in the past, depressed now despite it, but less dysphoric and unstable than seen in the past. Consider transitioning to latuda or seroquel if side effects emerge.   -lithium helpful with stone but not depression. Zyprexa has been helpful for stone, negative obsessions and for acute depression, however has still had depression emerge while on it and has led to weight gain.    Lexapro has been helpful with depression in the past and with obsessive thoughts, but not clearly helpful now. Lamictal not helpful with depression or mood stabilization.   -monitor for relapse on tramadol. continu naltrexone for opioid and alcohol use disorders  -continue antabuse 250 mg for alcohol use disorder, check comprehensive metabolic panel, per patient comprehensive metabolic panel recently checked by PCP and was normal.   -continue gabapentin 300 mg tid prn pain, insomnia.  -continue klonopin 0.5-1 mg qhs prn insomnia.   -recommended resumption of AA and [facility] aftercare.   -return in 2-3 months    More than 50% of the time was spent on counseling and coordination of care.  Psychoeducation, behavioral counseling.

## 2019-02-18 ENCOUNTER — PATIENT MESSAGE (OUTPATIENT)
Dept: PSYCHIATRY | Facility: CLINIC | Age: 69
End: 2019-02-18

## 2019-02-18 ENCOUNTER — TELEPHONE (OUTPATIENT)
Dept: PSYCHIATRY | Facility: HOSPITAL | Age: 69
End: 2019-02-18

## 2019-02-18 RX ORDER — OLANZAPINE 7.5 MG/1
7.5 TABLET ORAL NIGHTLY
Qty: 30 TABLET | Refills: 11
Start: 2019-02-18 | End: 2019-02-20 | Stop reason: SDUPTHER

## 2019-02-18 RX ORDER — NALTREXONE HYDROCHLORIDE 50 MG/1
50 TABLET, FILM COATED ORAL DAILY
Qty: 30 TABLET | Refills: 0
Start: 2019-02-18 | End: 2019-02-20 | Stop reason: SDUPTHER

## 2019-02-18 RX ORDER — ESCITALOPRAM OXALATE 20 MG/1
20 TABLET ORAL DAILY
Qty: 30 TABLET | Refills: 5
Start: 2019-02-18 | End: 2019-02-20 | Stop reason: SDUPTHER

## 2019-02-18 NOTE — TELEPHONE ENCOUNTER
Spoke with patient and her  on the phone after they called and inbasket messaged me. Patient reports feeling more depressed and anxious. Per  has been obsessing about salvation. Asked her to increase lexapro back to 20 mg daily and zyprexa back to 7.5 mg qhs. Consider furhter increase in zyprexa back to 10 mg qhs. Per  patient had relapse on alcohol back in October. He has since been giving her antabuse again and watching her take it. Planned to restart naltrexone as well now to help with alcohol and opioid cravings (had been opioids for wrist fracture which may have been triggering). Offered her appointment which she accepted on Wednesday 2/20 at 11:30am to discuss symptoms and medications further.

## 2019-02-20 ENCOUNTER — OFFICE VISIT (OUTPATIENT)
Dept: PSYCHIATRY | Facility: CLINIC | Age: 69
End: 2019-02-20
Payer: COMMERCIAL

## 2019-02-20 VITALS
SYSTOLIC BLOOD PRESSURE: 132 MMHG | WEIGHT: 151 LBS | HEART RATE: 77 BPM | BODY MASS INDEX: 23.7 KG/M2 | DIASTOLIC BLOOD PRESSURE: 90 MMHG | HEIGHT: 67 IN

## 2019-02-20 DIAGNOSIS — F11.10 OPIOID USE DISORDER, MILD, ABUSE: ICD-10-CM

## 2019-02-20 DIAGNOSIS — F31.9 BIPOLAR I DISORDER, CURRENT EPISODE DEPRESSED: Primary | ICD-10-CM

## 2019-02-20 DIAGNOSIS — F10.20 ALCOHOL USE DISORDER, SEVERE, DEPENDENCE: ICD-10-CM

## 2019-02-20 PROCEDURE — 99215 PR OFFICE/OUTPT VISIT, EST, LEVL V, 40-54 MIN: ICD-10-PCS | Mod: S$GLB,,, | Performed by: PSYCHIATRY & NEUROLOGY

## 2019-02-20 PROCEDURE — 99999 PR PBB SHADOW E&M-EST. PATIENT-LVL III: CPT | Mod: PBBFAC,,, | Performed by: PSYCHIATRY & NEUROLOGY

## 2019-02-20 PROCEDURE — 99999 PR PBB SHADOW E&M-EST. PATIENT-LVL III: ICD-10-PCS | Mod: PBBFAC,,, | Performed by: PSYCHIATRY & NEUROLOGY

## 2019-02-20 PROCEDURE — 99215 OFFICE O/P EST HI 40 MIN: CPT | Mod: S$GLB,,, | Performed by: PSYCHIATRY & NEUROLOGY

## 2019-02-20 RX ORDER — GABAPENTIN 300 MG/1
300 CAPSULE ORAL 3 TIMES DAILY PRN
Qty: 90 CAPSULE | Refills: 5 | Status: SHIPPED | OUTPATIENT
Start: 2019-02-20 | End: 2019-08-22 | Stop reason: SDUPTHER

## 2019-02-20 RX ORDER — ESCITALOPRAM OXALATE 20 MG/1
20 TABLET ORAL DAILY
Qty: 30 TABLET | Refills: 5 | Status: SHIPPED | OUTPATIENT
Start: 2019-02-20 | End: 2019-08-22 | Stop reason: SDUPTHER

## 2019-02-20 RX ORDER — NALTREXONE HYDROCHLORIDE 50 MG/1
50 TABLET, FILM COATED ORAL DAILY
Qty: 30 TABLET | Refills: 5 | Status: SHIPPED | OUTPATIENT
Start: 2019-02-20 | End: 2019-03-22

## 2019-02-20 RX ORDER — DISULFIRAM 250 MG/1
250 TABLET ORAL DAILY
Qty: 30 TABLET | Refills: 5 | Status: SHIPPED | OUTPATIENT
Start: 2019-02-20 | End: 2019-08-22 | Stop reason: SDUPTHER

## 2019-02-20 RX ORDER — OLANZAPINE 7.5 MG/1
7.5 TABLET ORAL NIGHTLY
Qty: 30 TABLET | Refills: 5 | Status: SHIPPED | OUTPATIENT
Start: 2019-02-20 | End: 2019-08-22 | Stop reason: SDUPTHER

## 2019-02-20 RX ORDER — CLONAZEPAM 1 MG/1
.5-1 TABLET ORAL NIGHTLY
Qty: 30 TABLET | Refills: 5 | Status: SHIPPED | OUTPATIENT
Start: 2019-02-20 | End: 2019-08-22 | Stop reason: SDUPTHER

## 2019-02-20 NOTE — PROGRESS NOTES
"Ambulatory Psychiatry Established Patient Follow-up Note      Chief Complaint  presents for followup of depression, alcohol use disorder    Time Spent  40 minutes    People Present  Patient     Interval Hx  Patient reached out 2 days ago because feeling more depressed, spoke with her and her  on the phone. Per  she has been ruminating about her salvation and severely depressed. Instructed her to increase lexapro back up to 20 mg daily and zyprexa up to 7.5 mg qhs.  also revelaed that the patient had been drinking again in October, at which point he restarted her antabuse.     Pt returns for follow up today reporting still feeling depressed and anxious but much better than 2 days ago. Had suicidal thoughts including thoughts of ending life by cutting her throat on Monday. States that thoughts have resolved since then, no further SI in the past 2 days. Has reached out to friends, sponsor and Protestant which has helped. Still ruminating about salvation, has insight that this is obsessive in nature. Discussed ways to manage obsessional thoughts. Counseled on mindfulness techniques and distraction.    Presribed   ROS   Complete review of systems performed covering Constitutional, Eyes, ENT/Mouth, Cardiovascular, Respiratory, Gastrointestinal, Genitourinary, Musculoskeletal, Skin, Neurologic, Endocrine, and Allergy/Immune. Intermittent back pain. All other systems were negative.    Psych ROS covered elsewhere in note (HPI)    PFSH  Past Medical History reviewed: Yes  Family History reviewed: No  Social History reviewed: Yes  Medications/problem list/allergies reviewed: Yes    Medications  zyprexa 10  mg at bedtime.  Klonopin 0.5 mg qhs  Lexapro 15 mg daily    Allergies  Review of patient's allergies indicates:  No Known Allergies    EXAM  VITALS   Vitals:    02/20/19 1058   BP: (!) 132/90   Pulse: 77   Weight: 68.5 kg (151 lb 0.2 oz)   Height: 5' 7" (1.702 m)     RELEVANT LABS/STUDIES:    PSYCHIATRIC " EXAMINATION  Appearance: well groomed, appearing  stated age, normal weight  Behavior: cooperative, mild psychomotor agitation at times with hand wringing  Speech: normal rate and amount  Mood: depressed  Affect: anxious  Thought Process: linear   Thought Content: recent suicidal thoughts with thoughts of cutting own throat with knife 2 days ago, reports resolution of thoughts and denies any SI for the past 2 days. Ruminations about salavation, excessive guilt.  Associations: intact  Memory: grossly intact.  Level of Consciousness/Orientation: grossly intact  Fund of Knowledge: good  Attention: fair  Language: fluent, naming intact  Insight: fair  Judgment: fair at this moment, pt reached out for help when having deep dsyphoria and SI 2 days ago    Neurological signs: no involuntary movements or tremor  Gait: normal    Medical Decision Making    IMPRESSION   65 yo  retired woman with past psych hx significant for seasonal depressive episodes, anxiety and alcohol use disorder in sustained remission, off antidepressants for past several years, now presenting with one month hx of depressed mood associated with hyperreligious obsessions. Patient [facility] for treatment of major depressive episode with psychosis and had partial response to combination of lexapro 20 mg and zyprexa 7.5 mg at bedtime. Pt on follow up on 8/11 denied current hyperreligiousity or obsessiveness but still reports depression. Continued on lexapro 20 mg and increased dose of zyprexa 10 mg, pt returned less than one week later with several days of hyperactivity, severe insomnia and restlessness with euthymic mood, on exam affect bright and mildly expansive. On review of remote records, patient with periods of elevated mood possibly coincing with past episodes of substance use, prior diagnosis of bipolar disorder for periods of insomnia and hyperactivity. +Family hx (daughter with bipolar), as well as mixed features to prior depressions  (delusions, hyperreligiousity, obsessiveness) consistent with bipolar II disorder. Then reduced lexapro to 10 mg, started on klonopin for sleep and continued zyprexa at 10 mg. Patient returned one week later on 8/25 with lower mood, variable energy but still appearing  mildly agitated, having incongruent affect and hyperreligious on exam. Of note pt had decreased zyprexa fromo 10 mg to 3.75 mg several days before due to concern for mild transaminitis and lower energy. Attempted crosstaper of zyprexa to risperdal but patient resumed zyprexa due to continued extremes mood swings and insomnia. Continued to taper off lexapro. Patient returned 2 weeks ago dysphoric and suicidal as well as with psychomotor agitation, increased goal directed activity and hyperreligiousity. Concerned for risk to self given severity of despair, expressed desire to die, Lutheran conviction and agitation. Admitted to [facility], where sx stabilized with addition of lithium. Patient without any further suicidal ideation, delusions or mixed sx, but now reporting apathy and sustained depression, also complaining of some cognitive effects and blurry vision that she ties to increase in lithium dose. Reduced lithium from 750 mg to 600 mg while keeping other meds the same. Pt returned today reporting euthymia and fairly stable mood apart from some overshopping, but states this is achronic impulse not clearly related to mood. Continued patient on lithium 600 mg, zyprexa 10, lexapro 5 and klonopin 0.5-1 for 2 weeks. Per  patient has been stable, however patient reporting some dysphoria. Started patient on lamictal and tapering off zyprexa and lexapro, patient reporting good mood, denying depression or manic sx, appearing euthymic. However patient returned 2 weeks later in mid November with high anxiety, insomnia, suggestive of mixed hypomanic state given high level of activity, mild impulsivity and frequent good but labile mood. Started pateint back  on zyprexa 2.5 mg qhs with good effect, calmer and euthymic but still with anxiety, obsessive thoughts and insomnia. Increased lamictal to target anxiety, pt increased to 75 mg only rather than 100 mg due to misunderstanding. Has been mostly stable but with periodic breakthroughs of insomnia, depressed mood and racing thoughts which have responded to lamictal increases and temporary increases in zyprexa. Mood in general has been more positive with higher doses of lamictal, but still with breakthrough periods of anxiety, dypshoria and agitation. Due to clear responses to zyprexa and lamictal but not to lithium other than possible help with mood stabilization, have increased zyprexa dose to 7.5 mg qhs permanently and reduced dose of lithium to 450 mg qhs. Patient has continued to have depression with obsessive ruminations without clear evidence of stone or mixed state currently. Started patient on lexapro 2.5 mg daily with mild benefit to mood, no sign currently of activation. Gradually increased to 10 mg daily with resulting improvement in depression. Pt returned for follow up in summer 2017 reporting euthymia and stable mood, with only mild Sabianism ruminations about salvation (chronic but much improved today) and insomnia which responds to combination of zyprexa and klonopin. Subsequently tapered off of lithium. Pt returned in FAll 2017 with worsened mood, mild depressive sx, after which we increased her lexapro to 15 mg. She returned in Spring 2018 for follow up after a period of apparent stability today frankly intoxicated with BAL above legal limit, having driven to office, reporting daily heavy use of alcohol and occasional abuse of tramadol.  Of note has been planning a last minute wedding and patient has history of stone/mixed states in the Spring, raising concern for bipolar disorder precipitating her relapse. Patient referred to [facility] for alcohol use disorder and opioid abuse, completed at end of June 2018.  Has subsequently been sober but mood has gradually declined per patient report. On exam appeared blunted, mildly psychomotor retarded and with recent weight gain. Appeared to be in mild-moderate depressive episode. Reduced zyprexa. In retrospect, patient was probably drinking at the time which she denies to me, but her  later reported that she was abusing alcohol again in Fall of 2018. Pt returned for follow up in January reporting mild depression, but sobriety, asked to reduce zyprexa further due to weight gain. Reduced zyprexa from 7.5 mg to 5 mg and lexapro from 20 mg to 15 mg, pt subsequently developed severe depression with borderline psychotic ruminations about salvation and ultimately SI 2 days ago. Pt reached out to friend for help and to this writer. Increased zyprexa back to 7.5 mg and lexapro back to 20 mg, pt returns today reporting continued depression, anxiety and rumination but better mood than 2 days ago and resolution of SI.     DIAGNOSES  Alcohol use Disorder, severe, in early remission  Bipolar Disorder I, most recent episode depressed.  Opioid Use disorder, moderate, in early remission.    PLAN  -increase zyprexa back to 7. 5  mg qhs. Instructed her to increase further to 10 mg qhs if not continuing to improve over coming days.  -increase lexapro back to 20 mg daily, SSRI not typically used long term in bipolar disorder, but patient with recurrent depressions whenever taken off that do not respond to other inteventions. Combination of zyprexa and SSRI appears most beneficial for her mood. -encouraged her to resume exercise, walking daily for one hour, which should help with weight gain and mood.   -recommended that she follow up with PCP for blood pressure monitoring and to check fasting glucose and lipids.  -consider switch to latuda or vraylar in the future.  -lithium helpful with stone but not depression. Zyprexa has been helpful for stone, negative obsessions and for acute depression,  however has still had depression emerge while on it and has led to weight gain.    Lexapro has been helpful with depression in the past and with obsessive thoughts, but not clearly helpful now. Lamictal not helpful with depression or mood stabilization.   -monitor for relapse on tramadol. continu naltrexone for opioid and alcohol use disorders  -continue antabuse 250 mg for alcohol use disorder, check comprehensive metabolic panel, per patient comprehensive metabolic panel recently checked by PCP and was normal.   -continue gabapentin 300 mg tid prn pain, insomnia.  -continue klonopin 0.5-1 mg qhs prn insomnia.   -continue AA, resume therapy, counseled on behavioral strategies to distract self from obsessions.  -return in 1 month, instructed her to call me or go to ED if feeling worse or if suicidal sx return    More than 50% of the time was spent on counseling and coordination of care.  Psychoeducation, behavioral counseling.

## 2019-02-20 NOTE — PATIENT INSTRUCTIONS
1. Continue zyprexa 7.5 mg at bedtime, if not feeling better next week, please increase to 10 mg at bedtime.  2. Continue lexapro 20 mg daily.  3. You may continue klonopin 0.5-1 mg at bedtime for insomnia.  4. Continue antabuse and naltrexone.   5. Resume therapy with lee brumfield  6. Distract yourself and use mindfulness techniques when getting in obsession spiral about salvation.  7. Get some activity books, paint by number kit, dot to dot books, etc to help distract yourself when obsessive.  8. Continue AA  9. Walk daily.  10. Call me or send message through portal if not feeling better.

## 2019-03-22 ENCOUNTER — OFFICE VISIT (OUTPATIENT)
Dept: PSYCHIATRY | Facility: CLINIC | Age: 69
End: 2019-03-22
Payer: COMMERCIAL

## 2019-03-22 VITALS
BODY MASS INDEX: 24.48 KG/M2 | DIASTOLIC BLOOD PRESSURE: 70 MMHG | WEIGHT: 156.31 LBS | SYSTOLIC BLOOD PRESSURE: 130 MMHG | HEART RATE: 69 BPM

## 2019-03-22 DIAGNOSIS — F31.70 BIPOLAR DISORDER, CURRENTLY IN REMISSION, MOST RECENT EPISODE UNSPECIFIED: Primary | ICD-10-CM

## 2019-03-22 DIAGNOSIS — F42.9 OBSESSIVE-COMPULSIVE DISORDER, UNSPECIFIED TYPE: ICD-10-CM

## 2019-03-22 DIAGNOSIS — F10.21 ALCOHOL USE DISORDER, SEVERE, IN EARLY REMISSION: ICD-10-CM

## 2019-03-22 DIAGNOSIS — F11.11 OPIOID USE DISORDER, MILD, IN EARLY REMISSION: ICD-10-CM

## 2019-03-22 PROCEDURE — 99214 OFFICE O/P EST MOD 30 MIN: CPT | Mod: S$GLB,,, | Performed by: PSYCHIATRY & NEUROLOGY

## 2019-03-22 PROCEDURE — 99214 PR OFFICE/OUTPT VISIT, EST, LEVL IV, 30-39 MIN: ICD-10-PCS | Mod: S$GLB,,, | Performed by: PSYCHIATRY & NEUROLOGY

## 2019-03-22 PROCEDURE — 99999 PR PBB SHADOW E&M-EST. PATIENT-LVL II: CPT | Mod: PBBFAC,,, | Performed by: PSYCHIATRY & NEUROLOGY

## 2019-03-22 PROCEDURE — 99999 PR PBB SHADOW E&M-EST. PATIENT-LVL II: ICD-10-PCS | Mod: PBBFAC,,, | Performed by: PSYCHIATRY & NEUROLOGY

## 2019-03-22 NOTE — PATIENT INSTRUCTIONS
Continue current mood medications including lexapro 20 mg daily and zyprexa 7.5 mg at bedtime.  Confirm with your PCP that blood glucose is normal, recommend checking hemoglobin a1c.  Continue antabuse, stop naltrexone. You may resume if still having relapses on alcohol with antabuse

## 2019-03-22 NOTE — PROGRESS NOTES
"Ambulatory Psychiatry Established Patient Follow-up Note      Chief Complaint  presents for followup of depression, alcohol use disorder    Time Spent  20 minutes    People Present  Patient     Interval Hx  Doing "pretty good". Depression is better. Ruminations still there. Has not resumed therapy yet. Has been walking. Sleeping well. Denies alcohol use, taking antabuse. Returned to , sees sponsor weekly.       ROS   Complete review of systems performed covering Constitutional, Eyes, ENT/Mouth, Cardiovascular, Respiratory, Gastrointestinal, Genitourinary, Musculoskeletal, Skin, Neurologic, Endocrine, and Allergy/Immune. Intermittent back pain. All other systems were negative.    Psych ROS covered elsewhere in note (HPI)    PFSH  Past Medical History reviewed: Yes  Family History reviewed: No  Social History reviewed: Yes  Medications/problem list/allergies reviewed: Yes    Medications  zyprexa 7.5 mg at bedtime.  Klonopin 0.5 mg qhs  Lexapro 20 mg daily    Allergies  Review of patient's allergies indicates:  No Known Allergies    EXAM  VITALS   Vitals:    03/22/19 1500   BP: 130/70   Pulse: 69   Weight: 70.9 kg (156 lb 4.9 oz)     RELEVANT LABS/STUDIES:    PSYCHIATRIC EXAMINATION  Appearance: well groomed, appearing  stated age, normal weight  Behavior: cooperative, mild psychomotor agitation at times with hand wringing  Speech: normal rate and amount  Mood: depressed  Affect: anxious  Thought Process: linear   Thought Content: recent suicidal thoughts with thoughts of cutting own throat with knife 2 days ago, reports resolution of thoughts and denies any SI for the past 2 days. Ruminations about salavation, excessive guilt.  Associations: intact  Memory: grossly intact.  Level of Consciousness/Orientation: grossly intact  Fund of Knowledge: good  Attention: fair  Language: fluent, naming intact  Insight: fair  Judgment: fair at this moment, pt reached out for help when having deep dsyphoria and SI 2 days " ago    Neurological signs: no involuntary movements or tremor  Gait: normal    Medical Decision Making    IMPRESSION   65 yo  retired woman with past psych hx significant for seasonal depressive episodes, anxiety and alcohol use disorder in sustained remission, off antidepressants for past several years, now presenting with one month hx of depressed mood associated with hyperreligious obsessions. Patient admitted to [facility] for treatment of major depressive episode with psychosis and had partial response to combination of lexapro 20 mg and zyprexa 7.5 mg at bedtime. Pt on follow up on 8/11 denied current hyperreligiousity or obsessiveness but still reports depression. Continued on lexapro 20 mg and increased dose of zyprexa 10 mg, pt returned less than one week later with several days of hyperactivity, severe insomnia and restlessness with euthymic mood, on exam affect bright and mildly expansive. On review of remote records, patient with periods of elevated mood possibly coincing with past episodes of substance use, prior diagnosis of bipolar disorder for periods of insomnia and hyperactivity. +Family hx (daughter with bipolar), as well as mixed features to prior depressions (delusions, hyperreligiousity, obsessiveness) consistent with bipolar II disorder. Then reduced lexapro to 10 mg, started on klonopin for sleep and continued zyprexa at 10 mg. Patient returned one week later on 8/25 with lower mood, variable energy but still appearing  mildly agitated, having incongruent affect and hyperreligious on exam. Of note pt had decreased zyprexa fromo 10 mg to 3.75 mg several days before due to concern for mild transaminitis and lower energy. Attempted crosstaper of zyprexa to risperdal but patient resumed zyprexa due to continued extremes mood swings and insomnia. Continued to taper off lexapro. Patient returned 2 weeks ago dysphoric and suicidal as well as with psychomotor agitation, increased goal directed  activity and hyperreligiousity. Concerned for risk to self given severity of despair, expressed desire to die, Adventism conviction and agitation. Admitted to [facility], where sx stabilized with addition of lithium. Patient without any further suicidal ideation, delusions or mixed sx, but now reporting apathy and sustained depression, also complaining of some cognitive effects and blurry vision that she ties to increase in lithium dose. Reduced lithium from 750 mg to 600 mg while keeping other meds the same. Pt returned today reporting euthymia and fairly stable mood apart from some overshopping, but states this is achronic impulse not clearly related to mood. Continued patient on lithium 600 mg, zyprexa 10, lexapro 5 and klonopin 0.5-1 for 2 weeks. Per  patient has been stable, however patient reporting some dysphoria. Started patient on lamictal and tapering off zyprexa and lexapro, patient reporting good mood, denying depression or manic sx, appearing euthymic. However patient returned 2 weeks later in mid November with high anxiety, insomnia, suggestive of mixed hypomanic state given high level of activity, mild impulsivity and frequent good but labile mood. Started pateint back on zyprexa 2.5 mg qhs with good effect, calmer and euthymic but still with anxiety, obsessive thoughts and insomnia. Increased lamictal to target anxiety, pt increased to 75 mg only rather than 100 mg due to misunderstanding. Has been mostly stable but with periodic breakthroughs of insomnia, depressed mood and racing thoughts which have responded to lamictal increases and temporary increases in zyprexa. Mood in general has been more positive with higher doses of lamictal, but still with breakthrough periods of anxiety, dypshoria and agitation. Due to clear responses to zyprexa and lamictal but not to lithium other than possible help with mood stabilization, have increased zyprexa dose to 7.5 mg qhs permanently and reduced dose of  lithium to 450 mg qhs. Patient has continued to have depression with obsessive ruminations without clear evidence of stone or mixed state currently. Started patient on lexapro 2.5 mg daily with mild benefit to mood, no sign currently of activation. Gradually increased to 10 mg daily with resulting improvement in depression. Pt returned for follow up in summer 2017 reporting euthymia and stable mood, with only mild Hinduism ruminations about salvation (chronic but much improved today) and insomnia which responds to combination of zyprexa and klonopin. Subsequently tapered off of lithium. Pt returned in FAll 2017 with worsened mood, mild depressive sx, after which we increased her lexapro to 15 mg. She returned in Spring 2018 for follow up after a period of apparent stability today frankly intoxicated with BAL above legal limit, having driven to office, reporting daily heavy use of alcohol and occasional abuse of tramadol.  Of note has been planning a last minute wedding and patient has history of stone/mixed states in the Spring, raising concern for bipolar disorder precipitating her relapse. Patient referred to [facility] for alcohol use disorder and opioid abuse, completed at end of June 2018. Has subsequently been sober but mood has gradually declined per patient report. On exam appeared blunted, mildly psychomotor retarded and with recent weight gain. Appeared to be in mild-moderate depressive episode. Reduced zyprexa. In retrospect, patient was probably drinking at the time which she denies to me, but her  later reported that she was abusing alcohol again in Fall of 2018. Pt returned for follow up in January reporting mild depression, but sobriety, asked to reduce zyprexa further due to weight gain. Reduced zyprexa from 7.5 mg to 5 mg and lexapro from 20 mg to 15 mg, pt subsequently developed severe depression with borderline psychotic ruminations about salvation and ultimately SI.. Pt reached out to friend for  help and to this writer. Increased zyprexa back to 7.5 mg and lexapro back to 20 mg, pt returned 2 days later reporting continued depression, anxiety and rumination but better mood than 2 days ago and resolution of SI. Pt returns for follow up today one month after last visit, reporting good mood, no further SI and improvement in ruminations.     DIAGNOSES  Alcohol use Disorder, severe, in early remission  Bipolar Disorder I, most recent episode depressed.  Opioid Use disorder, moderate, in early remission.    PLAN  -continue zyprexa 7. 5  mg qhs.   -continue lexapro 20 mg daily, SSRI not typically used long term in bipolar disorder, but patient with recurrent depressions whenever taken off that do not respond to other inteventions. Combination of zyprexa and SSRI appears most beneficial for her mood.   -encouraged her to resume exercise, walking daily for one hour, which should help with weight gain and mood.   -recommended that she follow up with PCP for blood pressure monitoring and to check fasting glucose and lipids. Per patient cholesterol has been up 26 points, discussed increasing exercise to address (had not been walking regularly until recently).   -consider switch to latuda or vraylar in the future.  -lithium helpful with stone but not depression. Zyprexa has been helpful for stone, negative obsessions and for acute depression, however has still had depression emerge while on it and has led to weight gain. Lexapro has been helpful with depression and with obsessive thoughts. Lamictal not helpful with depression or mood stabilization.   -monitor for relapse on opioids . continue naltrexone for opioid and alcohol use disorders  -continue antabuse 250 mg for alcohol use disorder, per patient comprehensive metabolic panel recently checked by PCP and was normal.   -continue gabapentin 300 mg tid prn pain, insomnia.  -continue klonopin 0.5-1 mg qhs prn insomnia.   -continue AA, resume therapy, counseled on  behavioral strategies to distract self from obsessions.  -return in 2 months for follow up, advised her that I would be leaving [facility] in June, will see her one more time before then. REached out to staff and asked them to schedule her for follow up with dr Patterson after I leave, who is familiar with her case.    More than 50% of the time was spent on counseling and coordination of care.  Psychoeducation, behavioral counseling.

## 2019-03-30 PROBLEM — F31.70 BIPOLAR DISORDER IN FULL REMISSION: Status: RESOLVED | Noted: 2017-01-07 | Resolved: 2019-03-30

## 2019-03-30 PROBLEM — F11.11 OPIOID USE DISORDER, MILD, IN EARLY REMISSION: Status: ACTIVE | Noted: 2018-05-20

## 2019-03-30 PROBLEM — F10.21 ALCOHOL USE DISORDER, SEVERE, IN EARLY REMISSION: Status: ACTIVE | Noted: 2018-05-20

## 2019-03-30 PROBLEM — F31.9 BIPOLAR I DISORDER, CURRENT EPISODE DEPRESSED: Status: RESOLVED | Noted: 2018-10-30 | Resolved: 2019-03-30

## 2019-05-20 ENCOUNTER — OFFICE VISIT (OUTPATIENT)
Dept: PSYCHIATRY | Facility: CLINIC | Age: 69
End: 2019-05-20
Payer: COMMERCIAL

## 2019-05-20 VITALS
WEIGHT: 149.06 LBS | HEART RATE: 85 BPM | DIASTOLIC BLOOD PRESSURE: 61 MMHG | BODY MASS INDEX: 23.39 KG/M2 | HEIGHT: 67 IN | SYSTOLIC BLOOD PRESSURE: 116 MMHG

## 2019-05-20 DIAGNOSIS — F11.11 OPIOID USE DISORDER, MILD, IN EARLY REMISSION: ICD-10-CM

## 2019-05-20 DIAGNOSIS — F42.9 OBSESSIVE-COMPULSIVE DISORDER, UNSPECIFIED TYPE: ICD-10-CM

## 2019-05-20 DIAGNOSIS — F31.30 BIPOLAR I DISORDER, MOST RECENT EPISODE DEPRESSED: Primary | ICD-10-CM

## 2019-05-20 DIAGNOSIS — F10.21 ALCOHOL USE DISORDER, SEVERE, IN EARLY REMISSION: ICD-10-CM

## 2019-05-20 PROCEDURE — 99999 PR PBB SHADOW E&M-EST. PATIENT-LVL II: CPT | Mod: PBBFAC,,, | Performed by: PSYCHIATRY & NEUROLOGY

## 2019-05-20 PROCEDURE — 99999 PR PBB SHADOW E&M-EST. PATIENT-LVL II: ICD-10-PCS | Mod: PBBFAC,,, | Performed by: PSYCHIATRY & NEUROLOGY

## 2019-05-20 PROCEDURE — 99214 PR OFFICE/OUTPT VISIT, EST, LEVL IV, 30-39 MIN: ICD-10-PCS | Mod: S$GLB,,, | Performed by: PSYCHIATRY & NEUROLOGY

## 2019-05-20 PROCEDURE — 99214 OFFICE O/P EST MOD 30 MIN: CPT | Mod: S$GLB,,, | Performed by: PSYCHIATRY & NEUROLOGY

## 2019-05-20 NOTE — PROGRESS NOTES
"Ambulatory Psychiatry Established Patient Follow-up Note      Chief Complaint  presents for followup of depression, alcohol use disorder    Time Spent  20 minutes    People Present  Patient     Interval Hx  Complains of severe anxiety for past several weeks. Denies depressed. Walked today but otherwise not walkinig like she used to. Denies any alcohol use. Denies prescription drug misuse. Sleeping well. Is ruminating about salvation. Denies audtiory hallucinations or ideas of reference. Therapist was diagnosed with breast cancer and she has not been able to follow up with her.       ROS   Complete review of systems performed covering Constitutional, Eyes, ENT/Mouth, Cardiovascular, Respiratory, Gastrointestinal, Genitourinary, Musculoskeletal, Skin, Neurologic, Endocrine, and Allergy/Immune. Intermittent back pain. All other systems were negative.    Psych ROS covered elsewhere in note (HPI)    PFSH  Past Medical History reviewed: Yes  Family History reviewed: No  Social History reviewed: Yes  Medications/problem list/allergies reviewed: Yes    Medications  zyprexa 7.5 mg at bedtime.  Klonopin 0.5 mg qhs  Lexapro 20 mg daily    Allergies  Review of patient's allergies indicates:  No Known Allergies    EXAM  VITALS   Vitals:    05/20/19 1324   BP: 116/61   Pulse: 85   Weight: 67.6 kg (149 lb 0.5 oz)   Height: 5' 7" (1.702 m)     RELEVANT LABS/STUDIES:    PSYCHIATRIC EXAMINATION  Appearance: well groomed, appearing  stated age, normal weight  Behavior: cooperative, mild psychomotor agitation at times with hand wringing  Speech: normal rate and amount  Mood: anxious  Affect: anxious  Thought Process: linear   Thought Content: Denies SI. No auditory or visual hallucinations or delusions. Ruminations about salavation, excessive guilt.  Associations: intact  Memory: grossly intact.  Level of Consciousness/Orientation: grossly intact  Fund of Knowledge: good  Attention: fair  Language: fluent, naming intact  Insight: " fair  Judgment: fair     Neurological signs: no involuntary movements or tremor  Gait: normal    Medical Decision Making    IMPRESSION   70 yo  retired woman with past psych hx significant for seasonal depressive episodes, anxiety and alcohol use disorder in sustained remission, off antidepressants for past several years, first presenting to me in 2016 with one month hx of depressed mood associated with hyperreligious obsessions.     The following is a record of her treatment course between summer 2016 and Spring 2019:  Patient admitted [facility] for treatment of major depressive episode with psychosis and had partial response to combination of lexapro 20 mg and zyprexa 7.5 mg at bedtime. Pt on follow up on 8/11 denied current hyperreligiousity or obsessiveness but still reports depression. Continued on lexapro 20 mg and increased dose of zyprexa 10 mg, pt returned less than one week later with several days of hyperactivity, severe insomnia and restlessness with euthymic mood, on exam affect bright and mildly expansive. On review of remote records, patient with periods of elevated mood possibly coincing with past episodes of substance use, prior diagnosis of bipolar disorder for periods of insomnia and hyperactivity. +Family hx (daughter with bipolar), as well as mixed features to prior depressions (delusions, hyperreligiousity, obsessiveness) consistent with bipolar II disorder. Then reduced lexapro to 10 mg, started on klonopin for sleep and continued zyprexa at 10 mg. Patient returned one week later on 8/25 with lower mood, variable energy but still appearing  mildly agitated, having incongruent affect and hyperreligious on exam. Of note pt had decreased zyprexa fromo 10 mg to 3.75 mg several days before due to concern for mild transaminitis and lower energy. Attempted crosstaper of zyprexa to risperdal but patient resumed zyprexa due to continued extremes mood swings and insomnia. Continued to taper off  lexapro. Patient later returned dysphoric and suicidal as well as with psychomotor agitation, increased goal directed activity and hyperreligiousity. Concerned for risk to self given severity of despair, expressed desire to die, [employer] conviction and agitation. Admitted to [facility] in Fall 2016, where sx stabilized with addition of lithium. Patient without any further suicidal ideation, delusions or mixed sx, but then reporting apathy and sustained depression, also complaining of some cognitive effects and blurry vision that she ties to increase in lithium dose. Reduced lithium from 750 mg to 600 mg while keeping other meds the same. Pt returned reporting euthymia and fairly stable mood. Continued patient on lithium 600 mg, zyprexa 10, lexapro 5 and klonopin 0.5-1 for 2 weeks. Per  patient has been stable, however patient reporting some dysphoria. Started patient on lamictal and tapering off zyprexa and lexapro, patient reporting good mood, denying depression or manic sx, appearing euthymic. However patient returned 2 weeks later in mid November 2016 with high anxiety, insomnia, suggestive of mixed hypomanic state given high level of activity, mild impulsivity and frequent good but labile mood. Started pateint back on zyprexa 2.5 mg qhs with good effect, calmer and euthymic but still with anxiety, obsessive thoughts and insomnia. Increased lamictal to target anxiety, pt increased to 75 mg only rather than 100 mg due to misunderstanding. Has been mostly stable but with periodic breakthroughs of insomnia, depressed mood and racing thoughts which have responded to lamictal increases and temporary increases in zyprexa. Mood in general has been more positive with higher doses of lamictal, but still with breakthrough periods of anxiety, dypshoria and agitation. Due to clear responses to zyprexa and lamictal but not to lithium other than possible help with mood stabilization, have increased zyprexa dose to 7.5 mg qhs  permanently and reduced dose of lithium to 450 mg qhs. Patient has continued to have depression with obsessive ruminations without clear evidence of stone or mixed state currently. Started patient on lexapro 2.5 mg daily with mild benefit to mood, no sign currently of activation. Gradually increased to 10 mg daily with resulting improvement in depression. Pt returned for follow up in summer 2017 reporting euthymia and stable mood, with only mild Rastafarian ruminations about salvation (chronic but much improved today) and insomnia which responds to combination of zyprexa and klonopin. Subsequently tapered off of lithium. Pt returned in FAll 2017 with worsened mood, mild depressive sx, after which we increased her lexapro to 15 mg. She returned in Spring 2018 for follow up after a period of apparent stability today frankly intoxicated with BAL above legal limit, having driven to office, reporting daily heavy use of alcohol and occasional abuse of tramadol.  Of note has been planning a last minute wedding and patient has history of stone/mixed states in the Spring, raising concern for bipolar disorder precipitating her relapse. Patient referred to [facility] for alcohol use disorder and opioid abuse, completed at end of June 2018. Has subsequently been sober but mood has gradually declined per patient report. On exam appeared blunted, mildly psychomotor retarded and with recent weight gain. Appeared to be in mild-moderate depressive episode. Reduced zyprexa. In retrospect, patient was probably drinking at the time which she denies to me, but her  later reported that she was abusing alcohol again in Fall of 2018. Pt returned for follow up in January 2019 reporting mild depression, but sobriety, asked to reduce zyprexa further due to weight gain. Reduced zyprexa from 7.5 mg to 5 mg and lexapro from 20 mg to 15 mg, pt subsequently developed severe depression with borderline psychotic ruminations about salvation and  ultimately SI.. Pt reached out to friend for help and to this writer. Increased zyprexa back to 7.5 mg and lexapro back to 20 mg, pt returned 2 days later reporting continued depression, anxiety and rumination but better mood than 2 days ago and resolution of SI.     Pt since with with good mood and no further SI or any substance use on current dosing of zyprexa and lexapro, has had improvement in ruminations but they still are present and associated with anxiety but not clearly psychotic in nature     DIAGNOSES  Alcohol use Disorder, severe, in early remission  Bipolar Disorder I, most recent episode depressed.  Opioid Use disorder, moderate, in early remission.  Obsessive Compulsive Disorder    PLAN  -continue zyprexa 7. 5  mg qhs. Pt responds well to zyprexa for mood stabilization and psychosis but often resists taking and is averse to higher doses due to weight gain.  -continue lexapro 20 mg daily, SSRI not typically used long term in bipolar disorder, but patient with recurrent depressions whenever taken off that do not respond to other inteventions and with strong obsessive tendencies even when mood stable and not psychotic. Combination of zyprexa and SSRI appears most beneficial for her mood.   -encouraged her to resume exercise, walking daily for one hour, which should help with weight gain and mood.   -recommended that she follow up with PCP for blood pressure monitoring and to check fasting glucose and lipids. Per patient cholesterol has been up 26 points, discussed increasing exercise to address (had not been walking regularly until recently).   -consider switch to latuda or vraylar in the future.  -lithium was helpful with stone but not depression. Zyprexa has been helpful for stone, negative obsessions and for acute depression, however has still had depression emerge while on it and has led to weight gain limiting higher titration. Lexapro has been helpful with depression and with obsessive thoughts.  Lamictal not helpful with depression or mood stabilization.   -monitor for relapse on opioids. Consider resuming natlrexone but patient did nto find it helpful for cravings for alcohol.  -continue antabuse 250 mg for alcohol use disorder, per patient comprehensive metabolic panel recently checked by PCP and was normal.   -continue gabapentin 300 mg tid prn pain, insomnia.  -continue klonopin 0.5-1 mg qhs prn insomnia.  holds medication and has not abused. Has had difficult sleeping when attempting to lower.  -continue AA, resume therapy, counseled on behavioral strategies to distract self from obsessions.  -dvised her that I would be leaving [facility] in June. On 6/27 has appointment with dr Patterson to assumer her care after I leave, he is familiar with her case.    More than 50% of the time was spent on counseling and coordination of care.  Psychoeducation, behavioral counseling.

## 2019-06-05 ENCOUNTER — OFFICE VISIT (OUTPATIENT)
Dept: DERMATOLOGY | Facility: CLINIC | Age: 69
End: 2019-06-05
Payer: COMMERCIAL

## 2019-06-05 DIAGNOSIS — L82.1 SK (SEBORRHEIC KERATOSIS): ICD-10-CM

## 2019-06-05 DIAGNOSIS — L81.4 LENTIGO: Primary | ICD-10-CM

## 2019-06-05 DIAGNOSIS — D22.9 NEVUS: ICD-10-CM

## 2019-06-05 DIAGNOSIS — D18.00 ANGIOMA: ICD-10-CM

## 2019-06-05 PROCEDURE — 99999 PR PBB SHADOW E&M-EST. PATIENT-LVL II: CPT | Mod: PBBFAC,,, | Performed by: DERMATOLOGY

## 2019-06-05 PROCEDURE — 99203 PR OFFICE/OUTPT VISIT, NEW, LEVL III, 30-44 MIN: ICD-10-PCS | Mod: S$GLB,,, | Performed by: DERMATOLOGY

## 2019-06-05 PROCEDURE — 1101F PT FALLS ASSESS-DOCD LE1/YR: CPT | Mod: CPTII,S$GLB,, | Performed by: DERMATOLOGY

## 2019-06-05 PROCEDURE — 99999 PR PBB SHADOW E&M-EST. PATIENT-LVL II: ICD-10-PCS | Mod: PBBFAC,,, | Performed by: DERMATOLOGY

## 2019-06-05 PROCEDURE — 1101F PR PT FALLS ASSESS DOC 0-1 FALLS W/OUT INJ PAST YR: ICD-10-PCS | Mod: CPTII,S$GLB,, | Performed by: DERMATOLOGY

## 2019-06-05 PROCEDURE — 99203 OFFICE O/P NEW LOW 30 MIN: CPT | Mod: S$GLB,,, | Performed by: DERMATOLOGY

## 2019-06-05 RX ORDER — IRBESARTAN 300 MG/1
TABLET ORAL
COMMUNITY
Start: 2019-04-17 | End: 2023-02-24

## 2019-06-05 NOTE — PROGRESS NOTES
"  Subjective:       Patient ID:  Martha Garner is a 69 y.o. female who presents for   Chief Complaint   Patient presents with    Skin Check    Lesion     Patient is here today for a "mole" check.   Pt has a history of  moderate sun exposure in the past.   Pt recalls several blistering sunburns in the past- as a teenager on back  Pt has history of tanning bed use- no  Pt has  had moles removed in the past- no  Pt has history of melanoma in first degree relatives-  No    C/o lesion right temple x unsure. Denies pain or bleeding. No tx.     Lesion         Review of Systems   Constitutional: Negative for fever, chills, fatigue and malaise.   Skin: Positive for daily sunscreen use.   Hematologic/Lymphatic: Does not bruise/bleed easily.        Objective:    Physical Exam   Constitutional: She appears well-developed and well-nourished. No distress.   Neurological: She is alert and oriented to person, place, and time. She is not disoriented.   Psychiatric: She has a normal mood and affect.   Skin:   Areas Examined (abnormalities noted in diagram):   Scalp / Hair Palpated and Inspected  Head / Face Inspection Performed  Neck Inspection Performed  Chest / Axilla Inspection Performed  Abdomen Inspection Performed  Genitals / Buttocks / Groin Inspection Performed  Back Inspection Performed  RUE Inspected  LUE Inspection Performed  RLE Inspected  LLE Inspection Performed  Nails and Digits Inspection Performed                       Diagram Legend     Erythematous scaling macule/papule c/w actinic keratosis       Vascular papule c/w angioma      Pigmented verrucoid papule/plaque c/w seborrheic keratosis      Yellow umbilicated papule c/w sebaceous hyperplasia      Irregularly shaped tan macule c/w lentigo     1-2 mm smooth white papules consistent with Milia      Movable subcutaneous cyst with punctum c/w epidermal inclusion cyst      Subcutaneous movable cyst c/w pilar cyst      Firm pink to brown papule c/w dermatofibroma    "   Pedunculated fleshy papule(s) c/w skin tag(s)      Evenly pigmented macule c/w junctional nevus     Mildly variegated pigmented, slightly irregular-bordered macule c/w mildly atypical nevus      Flesh colored to evenly pigmented papule c/w intradermal nevus       Pink pearly papule/plaque c/w basal cell carcinoma      Erythematous hyperkeratotic cursted plaque c/w SCC      Surgical scar with no sign of skin cancer recurrence      Open and closed comedones      Inflammatory papules and pustules      Verrucoid papule consistent consistent with wart     Erythematous eczematous patches and plaques     Dystrophic onycholytic nail with subungual debris c/w onychomycosis     Umbilicated papule    Erythematous-base heme-crusted tan verrucoid plaque consistent with inflamed seborrheic keratosis     Erythematous Silvery Scaling Plaque c/w Psoriasis     See annotation      Assessment / Plan:        Lentigo  This is a benign hyperpigmented sun induced lesion. Daily sun protection will reduce the number of new lesions. Treatment of these benign lesions are considered cosmetic.  The nature of sun-induced photo-aging and skin cancers is discussed.  Sun avoidance, protective clothing, and the use of 30-SPF sunscreens is advised. Observe closely for skin damage/changes, and call if such occurs.  Elta daily tinted recommended    Angioma  These are benign vascular lesions that are inherited.  Treatment is not necessary.    Nevus  Discussed ABCDE's of nevi.  Monitor for new mole or moles that are becoming bigger, darker, irritated, or developing irregular borders. Brochure provided.    SK (seborrheic keratosis)  These are benign inherited growths without a malignant potential. Reassurance given to patient. No treatment is necessary.     Total body skin examination performed today including at least 12 points as noted in physical examination. No lesions suspicious for malignancy noted.             Follow up in about 2 years (around  6/5/2021).

## 2019-08-22 ENCOUNTER — OFFICE VISIT (OUTPATIENT)
Dept: PSYCHIATRY | Facility: CLINIC | Age: 69
End: 2019-08-22
Payer: COMMERCIAL

## 2019-08-22 DIAGNOSIS — F42.9 OBSESSIVE-COMPULSIVE DISORDER, UNSPECIFIED TYPE: ICD-10-CM

## 2019-08-22 DIAGNOSIS — F31.9 BIPOLAR I DISORDER, CURRENT EPISODE DEPRESSED: ICD-10-CM

## 2019-08-22 DIAGNOSIS — F11.11 OPIOID USE DISORDER, MILD, IN EARLY REMISSION: Primary | ICD-10-CM

## 2019-08-22 PROCEDURE — 99213 PR OFFICE/OUTPT VISIT, EST, LEVL III, 20-29 MIN: ICD-10-PCS | Mod: S$GLB,,, | Performed by: PSYCHIATRY & NEUROLOGY

## 2019-08-22 PROCEDURE — 90833 PSYTX W PT W E/M 30 MIN: CPT | Mod: S$GLB,,, | Performed by: PSYCHIATRY & NEUROLOGY

## 2019-08-22 PROCEDURE — 99213 OFFICE O/P EST LOW 20 MIN: CPT | Mod: S$GLB,,, | Performed by: PSYCHIATRY & NEUROLOGY

## 2019-08-22 PROCEDURE — 1101F PR PT FALLS ASSESS DOC 0-1 FALLS W/OUT INJ PAST YR: ICD-10-PCS | Mod: CPTII,S$GLB,, | Performed by: PSYCHIATRY & NEUROLOGY

## 2019-08-22 PROCEDURE — 99999 PR PBB SHADOW E&M-EST. PATIENT-LVL I: ICD-10-PCS | Mod: PBBFAC,,, | Performed by: PSYCHIATRY & NEUROLOGY

## 2019-08-22 PROCEDURE — 99999 PR PBB SHADOW E&M-EST. PATIENT-LVL I: CPT | Mod: PBBFAC,,, | Performed by: PSYCHIATRY & NEUROLOGY

## 2019-08-22 PROCEDURE — 1101F PT FALLS ASSESS-DOCD LE1/YR: CPT | Mod: CPTII,S$GLB,, | Performed by: PSYCHIATRY & NEUROLOGY

## 2019-08-22 PROCEDURE — 90833 PR PSYCHOTHERAPY W/PATIENT W/E&M, 30 MIN (ADD ON): ICD-10-PCS | Mod: S$GLB,,, | Performed by: PSYCHIATRY & NEUROLOGY

## 2019-08-22 RX ORDER — ESCITALOPRAM OXALATE 20 MG/1
20 TABLET ORAL DAILY
Qty: 30 TABLET | Refills: 5 | Status: SHIPPED | OUTPATIENT
Start: 2019-08-22 | End: 2020-04-13 | Stop reason: SDUPTHER

## 2019-08-22 RX ORDER — GABAPENTIN 300 MG/1
300 CAPSULE ORAL 3 TIMES DAILY PRN
Qty: 90 CAPSULE | Refills: 5 | Status: SHIPPED | OUTPATIENT
Start: 2019-08-22 | End: 2020-04-13 | Stop reason: SDUPTHER

## 2019-08-22 RX ORDER — OLANZAPINE 5 MG/1
5 TABLET ORAL NIGHTLY
Qty: 30 TABLET | Refills: 5 | Status: SHIPPED | OUTPATIENT
Start: 2019-08-22 | End: 2020-04-13 | Stop reason: SDUPTHER

## 2019-08-22 RX ORDER — DISULFIRAM 250 MG/1
250 TABLET ORAL DAILY
Qty: 30 TABLET | Refills: 5 | Status: SHIPPED | OUTPATIENT
Start: 2019-08-22 | End: 2020-04-13 | Stop reason: SDUPTHER

## 2019-08-22 RX ORDER — CLONAZEPAM 1 MG/1
.5-1 TABLET ORAL NIGHTLY
Qty: 30 TABLET | Refills: 5 | Status: SHIPPED | OUTPATIENT
Start: 2019-08-22 | End: 2020-03-05

## 2019-08-22 NOTE — PROGRESS NOTES
Ambulatory Psychiatry Established Patient Follow-up Note      Chief Complaint  presents for followup of depression, alcohol use disorder      People Present  Patient     The following is a record of her treatment course between summer 2016 and Spring 2019:  [facility] for treatment of major depressive episode with psychosis and had partial response to combination of lexapro 20 mg and zyprexa 7.5 mg at bedtime. Pt on follow up on 8/11 denied current hyperreligiousity or obsessiveness but still reports depression. Continued on lexapro 20 mg and increased dose of zyprexa 10 mg, pt returned less than one week later with several days of hyperactivity, severe insomnia and restlessness with euthymic mood, on exam affect bright and mildly expansive. On review of remote records, patient with periods of elevated mood possibly coincing with past episodes of substance use, prior diagnosis of bipolar disorder for periods of insomnia and hyperactivity. +Family hx (daughter with bipolar), as well as mixed features to prior depressions (delusions, hyperreligiousity, obsessiveness) consistent with bipolar II disorder. Then reduced lexapro to 10 mg, started on klonopin for sleep and continued zyprexa at 10 mg. Patient returned one week later on 8/25 with lower mood, variable energy but still appearing  mildly agitated, having incongruent affect and hyperreligious on exam. Of note pt had decreased zyprexa fromo 10 mg to 3.75 mg several days before due to concern for mild transaminitis and lower energy. Attempted crosstaper of zyprexa to risperdal but patient resumed zyprexa due to continued extremes mood swings and insomnia. Continued to taper off lexapro. Patient later returned dysphoric and suicidal as well as with psychomotor agitation, increased goal directed activity and hyperreligiousity. Concerned for risk to self given severity of despair, expressed desire to die, Taoism conviction and agitation. [facility]mitted to  [facility], where sx stabilized with addition of lithium. Patient without any further suicidal ideation, delusions or mixed sx, but then reporting apathy and sustained depression, also complaining of some cognitive effects and blurry vision that she ties to increase in lithium dose. Reduced lithium from 750 mg to 600 mg while keeping other meds the same. Pt returned reporting euthymia and fairly stable mood.    Continued patient on lithium 600 mg, zyprexa 10, lexapro 5 and klonopin 0.5-1 for 2 weeks. Per  patient has been stable, however patient reporting some dysphoria. Started patient on lamictal and tapering off zyprexa and lexapro, patient reporting good mood, denying depression or manic sx, appearing euthymic. However patient returned 2 weeks later in mid November 2016 with high anxiety, insomnia, suggestive of mixed hypomanic state given high level of activity, mild impulsivity and frequent good but labile mood. Started pateint back on zyprexa 2.5 mg qhs with good effect, calmer and euthymic but still with anxiety, obsessive thoughts and insomnia. Increased lamictal to target anxiety, pt increased to 75 mg only rather than 100 mg due to misunderstanding. Has been mostly stable but with periodic breakthroughs of insomnia, depressed mood and racing thoughts which have responded to lamictal increases and temporary increases in zyprexa. Mood in general has been more positive with higher doses of lamictal, but still with breakthrough periods of anxiety, dypshoria and agitation. Due to clear responses to zyprexa and lamictal but not to lithium other than possible help with mood stabilization, have increased zyprexa dose to 7.5 mg qhs permanently and reduced dose of lithium to 450 mg qhs. Patient has continued to have depression with obsessive ruminations without clear evidence of stone or mixed state currently. Started patient on lexapro 2.5 mg daily with mild benefit to mood, no sign currently of  activation. Gradually increased to 10 mg daily with resulting improvement in depression. Pt returned for follow up in summer 2017 reporting euthymia and stable mood, with only mild Christianity ruminations about salvation (chronic but much improved today) and insomnia which responds to combination of zyprexa and klonopin. Subsequently tapered off of lithium. Pt returned in FAll 2017 with worsened mood, mild depressive sx, after which we increased her lexapro to 15 mg. She returned in Spring 2018 for follow up after a period of apparent stability today frankly intoxicated with BAL above legal limit, having driven to office, reporting daily heavy use of alcohol and occasional abuse of tramadol.  Of note has been planning a last minute wedding and patient has history of stone/mixed states in the Spring, raising concern for bipolar disorder precipitating her relapse. Patient referred to [facility] for alcohol use disorder and opioid abuse, completed at end of June 2018. Has subsequently been sober but mood has gradually declined per patient report. On exam appeared blunted, mildly psychomotor retarded and with recent weight gain. Appeared to be in mild-moderate depressive episode. Reduced zyprexa. In retrospect, patient was probably drinking at the time which she denies to me, but her  later reported that she was abusing alcohol again in Fall of 2018.     Pt returned for follow up in January 2019 reporting mild depression, but sobriety, asked to reduce zyprexa further due to weight gain. Reduced zyprexa from 7.5 mg to 5 mg and lexapro from 20 mg to 15 mg, pt subsequently developed severe depression with borderline psychotic ruminations about salvation and ultimately SI.. Pt reached out to friend for help and to this writer. Increased zyprexa back to 7.5 mg and lexapro back to 20 mg, pt returned 2 days later reporting continued depression, anxiety and rumination but better mood than 2 days ago and resolution of SI.      Pt  since with with good mood and no further SI or any substance use on current dosing of zyprexa and lexapro, has had improvement in ruminations but they still are present and associated with anxiety but not clearly psychotic in nature       Interval Hx  Complains of severe anxiety for past several weeks. Denies depressed. Walked today but otherwise not walkinig like she used to. Denies any alcohol use. Denies prescription drug misuse. Sleeping well. Is ruminating about salvation. Denies audtiory hallucinations or ideas of reference. Therapist was diagnosed with breast cancer and she has not been able to follow up with her .    Drank before  dtr's wedding last year after 7 years of sobriety then added antabuse . Now 1 year and 4-5 months sober     Psychiatric Review Of Systems - Is patient experiencing or having changes in:  sleep: no  appetite: no  weight: up cites zyprexa  energy/anergy: no  interest/pleasure/anhedonia: some issue as  likes to  walk but not   somatic symptoms: no  libido: no  anxiety/panic: yes, worried about past issue of salvation now rectified. [facility] on [address]  [address]  guilty/hopelessness: no  concentration: no  S.I.B.s/risky behavior: no  Irritability: no  Racing thoughts: no  Impulsive behaviors: not much ; a tad of shopping in stores   Paranoia: no  AVH: no    ROS   Complete review of systems performed covering Constitutional, Eyes, ENT/Mouth, Cardiovascular, Respiratory, Gastrointestinal, Genitourinary, Musculoskeletal, Skin, Neurologic, Endocrine, and Allergy/Immune. Intermittent back pain. All other systems were negative.    PSYCHOTHERAPY ADD-ON [id number]   16-37 minutes    Site: [facility], [address]  Time: 30 minutes  Participants: Met with patient    Therapeutic Intervention Type: supportive psychotherapy  Why chosen therapy is appropriate versus another modality: relevant to diagnosis, patient responds to this modality, evidence based  practice    Target symptoms: depression, substance abuse  Primary focus: relapse prevention   Psychotherapeutic techniques: supportive therapy     Outcome monitoring methods: self-report, observation    Patient's response to intervention:  The patient's response to intervention is accepting.    Progress toward goals:  The patient's progress toward goals is excellent.    PFSH  Past Medical History reviewed: Yes  Family History reviewed: No  Social History reviewed: Yes  Medications/problem list/allergies reviewed: Yes    Medications  zyprexa 7.5 mg at bedtime.  Klonopin 0.5 mg qhs  Lexapro 20 mg daily  Antabuse     Allergies  Review of patient's allergies indicates:  No Known Allergies    EXAM  VITALS   There were no vitals filed for this visit.       RELEVANT LABS/STUDIES:    PSYCHIATRIC EXAMINATION  Appearance: well groomed, appearing  stated age, normal weight  Behavior: cooperative, mild psychomotor agitation at times with hand wringing  Speech: normal rate and amount  Mood: anxious  Affect: anxious  Thought Process: linear   Thought Content: Denies SI. No auditory or visual hallucinations or delusions. Ruminations about salavation, excessive guilt.  Associations: intact  Memory: grossly intact.  Level of Consciousness/Orientation: grossly intact  Fund of Knowledge: good  Attention: fair  Language: fluent, naming intact  Insight: fair  Judgment: fair     Neurological signs: no involuntary movements or tremor  Gait: normal    Medical Decision Making    IMPRESSION   68 yo  retired woman with past psych hx significant for seasonal depressive episodes, anxiety and alcohol use disorder in sustained remission, off antidepressants for past several years, first presenting to DR Smith  in 2016 with one month hx of depressed mood associated with hyperreligious obsessions.        DIAGNOSES  Alcohol use Disorder, severe, in early remission  Bipolar Disorder I, most recent episode depressed.  Opioid Use disorder,  moderate, in early remission.  Obsessive Compulsive Disorder    PLAN  -decrease  Zyprexa to  5  mg qhs. Pt responds well to zyprexa for mood stabilization and psychosis but often resists taking and has been  averse to higher doses due to weight gain.  -continue lexapro 20 mg daily, SSRI not typically used long term in bipolar disorder, but patient with recurrent depressions whenever taken off that do not respond to other inteventions and with strong obsessive tendencies even when mood stable and not psychotic. Combination of zyprexa and SSRI appears most beneficial for her mood.   -encouraged her to resume exercise, walking daily for one hour, which should help with weight gain and mood.   -recommended that she follow up with PCP for blood pressure monitoring and to check fasting glucose and lipids. Per patient cholesterol has been up 26 points under Dr LIN who discussed increasing exercise to address (had not been walking regularly until recently).   -consider switch to latuda or vraylar in the future.    -lithium was helpful with stone but not depression. Zyprexa has been helpful for stone, negative obsessions and for acute depression,   Lexapro has been helpful with depression and with obsessive thoughts. Lamictal not helpful with depression or mood stabilization.   -monitor for relapse on opioids. Consider resuming natlrexone but patient did nto find it helpful for cravings for alcohol.  -continue antabuse 250 mg for alcohol use disorder, per patient comprehensive metabolic panel recently checked by PCP and was normal.   -continue gabapentin 300 mg tid prn pain, insomnia.  -continue klonopin 0.5-1 mg qhs prn insomnia.  holds medication and has not abused. Has had difficult sleeping when attempting to lower.  -continue AA, resume therapy, counseled on behavioral strategies to distract self from obsessions.      More than 50% of the time was spent on counseling and coordination of care.  Psychoeducation,  behavioral counseling.

## 2020-03-05 DIAGNOSIS — F31.9 BIPOLAR I DISORDER, CURRENT EPISODE DEPRESSED: ICD-10-CM

## 2020-03-05 RX ORDER — CLONAZEPAM 1 MG/1
TABLET ORAL
Qty: 30 TABLET | Refills: 0 | Status: SHIPPED | OUTPATIENT
Start: 2020-03-05 | End: 2020-04-13 | Stop reason: SDUPTHER

## 2020-04-13 ENCOUNTER — OFFICE VISIT (OUTPATIENT)
Dept: PSYCHIATRY | Facility: CLINIC | Age: 70
End: 2020-04-13
Payer: MEDICARE

## 2020-04-13 DIAGNOSIS — F10.21 ALCOHOL USE DISORDER, SEVERE, IN SUSTAINED REMISSION: ICD-10-CM

## 2020-04-13 DIAGNOSIS — F11.11 OPIOID USE DISORDER, MILD, IN EARLY REMISSION: ICD-10-CM

## 2020-04-13 DIAGNOSIS — F31.30 BIPOLAR I DISORDER, MOST RECENT EPISODE DEPRESSED: Primary | ICD-10-CM

## 2020-04-13 DIAGNOSIS — F31.9 BIPOLAR I DISORDER, CURRENT EPISODE DEPRESSED: ICD-10-CM

## 2020-04-13 PROCEDURE — 99213 OFFICE O/P EST LOW 20 MIN: CPT | Mod: 95,,, | Performed by: PSYCHIATRY & NEUROLOGY

## 2020-04-13 PROCEDURE — 99213 PR OFFICE/OUTPT VISIT, EST, LEVL III, 20-29 MIN: ICD-10-PCS | Mod: 95,,, | Performed by: PSYCHIATRY & NEUROLOGY

## 2020-04-13 RX ORDER — ESCITALOPRAM OXALATE 20 MG/1
20 TABLET ORAL DAILY
Qty: 90 TABLET | Refills: 3 | Status: SHIPPED | OUTPATIENT
Start: 2020-04-13 | End: 2021-03-12 | Stop reason: SDUPTHER

## 2020-04-13 RX ORDER — OLANZAPINE 5 MG/1
5 TABLET ORAL NIGHTLY
Qty: 90 TABLET | Refills: 1 | Status: SHIPPED | OUTPATIENT
Start: 2020-04-13 | End: 2021-03-12 | Stop reason: ALTCHOICE

## 2020-04-13 RX ORDER — CLONAZEPAM 1 MG/1
1 TABLET ORAL 2 TIMES DAILY
Qty: 180 TABLET | Refills: 1 | Status: SHIPPED | OUTPATIENT
Start: 2020-04-13 | End: 2021-03-12 | Stop reason: SDUPTHER

## 2020-04-13 RX ORDER — GABAPENTIN 300 MG/1
300 CAPSULE ORAL 3 TIMES DAILY PRN
Qty: 270 CAPSULE | Refills: 3 | Status: SHIPPED | OUTPATIENT
Start: 2020-04-13 | End: 2021-03-12 | Stop reason: SDUPTHER

## 2020-04-13 RX ORDER — DISULFIRAM 250 MG/1
250 TABLET ORAL DAILY
Qty: 30 TABLET | Refills: 5 | Status: SHIPPED | OUTPATIENT
Start: 2020-04-13 | End: 2021-03-12 | Stop reason: SDUPTHER

## 2020-04-13 NOTE — PROGRESS NOTES
Ambulatory Psychiatry Established Patient Follow-up Note      Chief Complaint  presents for followup of depression, alcohol use disorder      The patient location is: in her [place], [place] home with dario present   The chief complaint leading to consultation is: depression   Visit type: Virtual visit with synchronous audio and video  Total time spent with patient: 20 mins Each patient to whom he or she provides medical services by telemedicine is:  (1) informed of the relationship between the physician and patient and the respective role of any other health care provider with respect to management of the patient; and (2) notified that he or she may decline to receive medical services by telemedicine and may withdraw from such care at any time.    Notes: see below     People Present  Patient , dario     The following is a record of her treatment course between summer 2016 and Spring 2019:  [facility] for treatment of major depressive episode with psychosis and had partial response to combination of lexapro 20 mg and zyprexa 7.5 mg at bedtime. Pt on follow up on 8/11 denied current hyperreligiousity or obsessiveness but still reports depression. Continued on lexapro 20 mg and increased dose of zyprexa 10 mg, pt returned less than one week later with several days of hyperactivity, severe insomnia and restlessness with euthymic mood, on exam affect bright and mildly expansive. On review of remote records, patient with periods of elevated mood possibly coincing with past episodes of substance use, prior diagnosis of bipolar disorder for periods of insomnia and hyperactivity. +Family hx (daughter with bipolar), as well as mixed features to prior depressions (delusions, hyperreligiousity, obsessiveness) consistent with bipolar II disorder. Then reduced lexapro to 10 mg, started on klonopin for sleep and continued zyprexa at 10 mg. Patient returned one week later on 8/25 with lower mood, variable energy but still  appearing  mildly agitated, having incongruent affect and hyperreligious on exam. Of note pt had decreased zyprexa fromo 10 mg to 3.75 mg several days before due to concern for mild transaminitis and lower energy. Attempted crosstaper of zyprexa to risperdal but patient resumed zyprexa due to continued extremes mood swings and insomnia. Continued to taper off lexapro. Patient later returned dysphoric and suicidal as well as with psychomotor agitation, increased goal directed activity and hyperreligiousity. Concerned for risk to self given severity of despair, expressed desire to die, Scientology conviction and agitation. Admitted to [facility] in [facility] 2016, where sx stabilized with addition of lithium. Patient without any further suicidal ideation, delusions or mixed sx, but then reporting apathy and sustained depression, also complaining of some cognitive effects and blurry vision that she ties to increase in lithium dose. Reduced lithium from 750 mg to 600 mg while keeping other meds the same. Pt returned reporting euthymia and fairly stable mood.    Continued patient on lithium 600 mg, zyprexa 10, lexapro 5 and klonopin 0.5-1 for 2 weeks. Per  patient has been stable, however patient reporting some dysphoria. Started patient on lamictal and tapering off zyprexa and lexapro, patient reporting good mood, denying depression or manic sx, appearing euthymic. However patient returned 2 weeks later in mid November 2016 with high anxiety, insomnia, suggestive of mixed hypomanic state given high level of activity, mild impulsivity and frequent good but labile mood. Started pateint back on zyprexa 2.5 mg qhs with good effect, calmer and euthymic but still with anxiety, obsessive thoughts and insomnia. Increased lamictal to target anxiety, pt increased to 75 mg only rather than 100 mg due to misunderstanding. Has been mostly stable but with periodic breakthroughs of insomnia, depressed mood and racing thoughts which have  responded to lamictal increases and temporary increases in zyprexa. Mood in general has been more positive with higher doses of lamictal, but still with breakthrough periods of anxiety, dypshoria and agitation. Due to clear responses to zyprexa and lamictal but not to lithium other than possible help with mood stabilization, have increased zyprexa dose to 7.5 mg qhs permanently and reduced dose of lithium to 450 mg qhs. Patient has continued to have depression with obsessive ruminations without clear evidence of stone or mixed state currently. Started patient on lexapro 2.5 mg daily with mild benefit to mood, no sign currently of activation. Gradually increased to 10 mg daily with resulting improvement in depression. Pt returned for follow up in summer 2017 reporting euthymia and stable mood, with only mild Islam ruminations about salvation (chronic but much improved today) and insomnia which responds to combination of zyprexa and klonopin. Subsequently tapered off of lithium. Pt returned in FAll 2017 with worsened mood, mild depressive sx, after which we increased her lexapro to 15 mg. She returned in Spring 2018 for follow up after a period of apparent stability today frankly intoxicated with BAL above legal limit, having driven to office, reporting daily heavy use of alcohol and occasional abuse of tramadol.  Of note has been planning a last minute wedding and patient has history of stone/mixed states in the Spring, raising concern for bipolar disorder precipitating her relapse. Patient referred to [facility] for alcohol use disorder and opioid abuse, completed at end of June 2018. Has subsequently been sober but mood has gradually declined per patient report. On exam appeared blunted, mildly psychomotor retarded and with recent weight gain. Appeared to be in mild-moderate depressive episode. Reduced zyprexa. In retrospect, patient was probably drinking at the time which she denies to me, but her  later  reported that she was abusing alcohol again in Fall of 2018.     Pt returned for follow up in January 2019 reporting mild depression, but sobriety, asked to reduce zyprexa further due to weight gain. Reduced zyprexa from 7.5 mg to 5 mg and lexapro from 20 mg to 15 mg, pt subsequently developed severe depression with borderline psychotic ruminations about salvation and ultimately SI.. Pt reached out to friend for help and to this writer. Increased zyprexa back to 7.5 mg and lexapro back to 20 mg, pt returned 2 days later reporting continued depression, anxiety and rumination but better mood than 2 days ago and resolution of SI.      Pt since with with good mood and no further SI or any substance use on current dosing of zyprexa and lexapro, has had improvement in ruminations but they still are present and associated with anxiety but not clearly psychotic in nature       Interval Hx  In corona virus  isolation    Doing well. Denies depressed[place] [employer] [place]  . Denies any recent alcohol use. Denies prescription drug misuse. Sleeping well. Denies audtiory hallucinations or ideas of reference. Therapist was diagnosed with breast cancer and she has not been able to follow up with her .    Drank before  [facility]'s wedding last year after 7 years of sobriety then added antabuse . Thus 2 years of sobriety . Has one grchild and another on way in august , 2020 in [place] , [facility] .    Hus now fully retired on 12-16-19  . He is pleased with her health       Psychiatric Review Of Systems - Is patient experiencing or having changes in:  sleep: no  appetite: no  weight:  Some gain   energy/anergy: no  interest/pleasure/anhedonia: some issue as  likes to  walk but not   somatic symptoms: no  libido: no  anxiety/panic: yes, worried about past issue of salvation now rectified. [facility] on [address]  [address]  guilty/hopelessness: no  concentration: no  S.I.B.s/risky behavior:  no  Irritability: no  Racing thoughts: no  Impulsive behaviors: not much ; a tad of shopping in stores   Paranoia: no  AVH: no    ROS   Complete review of systems performed covering Constitutional, Eyes, ENT/Mouth, Cardiovascular, Respiratory, Gastrointestinal, Genitourinary, Musculoskeletal, Skin, Neurologic, Endocrine, and Allergy/Immune. Intermittent back pain. All other systems were negative.    PSYCHOTHERAPY ADD-ON [id number]   16-37 minutes    Site: [facility], [facility]  Time: 15  minutes  Participants: Met with patient    Therapeutic Intervention Type: supportive psychotherapy  Why chosen therapy is appropriate versus another modality: relevant to diagnosis, patient responds to this modality, evidence based practice    Target symptoms: depression, substance abuse  Primary focus: relapse prevention   Psychotherapeutic techniques: supportive therapy     Outcome monitoring methods: self-report, observation    Patient's response to intervention:  The patient's response to intervention is accepting.    Progress toward goals:  The patient's progress toward goals is excellent.    PFSH  Past Medical History reviewed: Yes  Family History reviewed: No  Social History reviewed: Yes  Medications/problem list/allergies reviewed: Yes    Medications  Gabapentin 300 mg tid   Olanzapine  5 mg at bedtime.  Klonopin 1 mg bid prn , mostly qhs   Escitalopram  20 mg daily  Antabuse 250 mg q day     Past meds :    Lamictal not helpful with depression or mood stabilization.       Allergies  Review of patient's allergies indicates:  No Known Allergies    EXAM  VITALS   There were no vitals filed for this visit.       RELEVANT LABS/STUDIES:    PSYCHIATRIC EXAMINATION  Appearance: well groomed, appearing  stated age, normal weight  Behavior: cooperative, mild psychomotor agitation at times with hand wringing  Speech: normal rate and amount  Mood: feel great   Affect: bright   Thought Process: linear     Thought Content:  Denies SI. No auditory or visual hallucinations or delusions. No Ruminations about salavation,nor  excessive guilt.    Associations: intact  Memory: grossly intact.  Level of Consciousness/Orientation: grossly intact  Fund of Knowledge: good  Attention: good   Language: fluent, naming intact  Insight: good   Judgment: good     Neurological signs: no involuntary movements or tremor  Gait: normal    Medical Decision Making    IMPRESSION doing well      DIAGNOSES  Alcohol use Disorder, severe, in early remission  Bipolar Disorder I, most recent episode depressed.  Opioid Use disorder, moderate, in early remission.  Obsessive Compulsive Disorder    PLAN  -continue   Zyprexa to  5  mg qhs, consider decrease .  Pt responds well to zyprexa for mood stabilization and psychosis but often resists taking and has been  averse to higher doses due to weight gain.    -continue lexapro 20 mg daily, SSRI not typically used long term in bipolar disorder, but patient with recurrent depressions whenever taken off that do not respond to other inteventions and with strong obsessive tendencies even when mood stable and not psychotic. Combination of zyprexa and SSRI appears most beneficial for her mood.     - In 2019 ,recommended that she follow up with PCP for blood pressure monitoring and to check fasting glucose and lipids. Per patient cholesterol has been up 26 points under Dr LIN who discussed increasing exercise to address (had not been walking regularly until recently).     -lithium was helpful with stone but not depression. Zyprexa has been helpful for stone, negative obsessions and for acute depression,   Lexapro has been helpful with depression and with obsessive thoughts.  -monitor for relapse on opioids. Consider resuming natlrexone but patient did nto find it helpful for cravings for alcohol.  -continue antabuse 250 mg for alcohol use disorder, per patient comprehensive metabolic panel recently checked by PCP and was normal.      -continue gabapentin 300 mg tid prn pain, insomnia.  -continue klonopin 1 mg 1-2 q day .  holds medication and has not abused. Has had difficult sleeping when attempting to lower.  -continue AA, resume therapy, counseled on behavioral strategies to distract self from obsessions.      More than 50% of the time was spent on counseling and coordination of care.  Psychoeducation, behavioral counseling.

## 2020-11-10 NOTE — PATIENT INSTRUCTIONS
1. You may reduce lexapro to 15 mg daily (1 and 1/2 of the 10 mg tablets) and zyprexa to 5 mg daily. Sent new prescriptions to your pharmacy.  2. Let me know if ruminative thoughts or mood worsens. Will plan to try another medication to replace zyprexa such as vraylar or latuda.  3. Continue antabuse. You may stop naltrexone if taking antabuse. Avoid opioid medications.  4. Return for follow up at 3pm on Friday march 22nd.   Nephrology  Patient: Pallavi Benton Date:11/10/2020   : 1948 Attending: Med Burnette MD   72 year old female        Chief complaint: End stage renal disease      Admission HPI:  This is a 72 year old female with a past medical history significant for ESRD on Peritoneal dialysis, patient of our associate at [address] Dr Boris Reese. Patient recently d/c from [facility] to rehab in [place], now presents to the hospital with elevated WBC count on labwork, abdominal pain. She denies fever. C/o constipation. Still c/o LE weakness. Patient planned to be admitted and w/u for the above issues. Nephrology has been consulted for assistance with her Peritoneal dialysis while inpatient. She does c/o mild abdominal pain in ER. Denies fevers. No cloudy PD fluids noted. No N/V. Has been constipated.      SUBJECTIVE:   Patient resting in bed, Tolerated HD session well yesterday. AVF working adequately. Awaiting placement        Past Medical History:   Diagnosis Date   • A-V fistula (CMS/[facility]ly [facility])     left arm   • Acute renal failure (CMS/Formerly [facility]) 2019   • Anemia 2019   • Anxiety    • Arthritis     knees & hands   • Bipolar disorder (CMS/Formerly [facility])    • Bronchitis    • Chronic kidney disease, stage III (moderate) (CMS/Formerly [facility])    • Colon polyp    • COPD (chronic obstructive pulmonary disease) (CMS/Formerly [facility])     2018: \"one  told me I have COPD\"   • Diabetes mellitus (CMS/[facility]ly [facility])     type 2 on insulin    • Diverticulosis    • Dry eye syndrome    • Fall 2018    Bruise above left eye, lip, and left Hand   • Hemodialysis patient (CMS/Formerly [facility]) started dialysis end of 2019    [facility]  and [place]  to 3/2019 - went to home Peritoneal dialysis 3/2019   • High cholesterol    • History of GI bleed 2019   • Hypertension    • Hypothyroid    • Internal hemorrhoids    • Major depressive disorder    • SONYA (obstructive sleep apnea) 2020   • Peritoneal dialysis status  (CMS/[facility]) start 3/2019   • Pneumonia 01/18/2019   • Pseudophakia of both eyes    • Seasonal allergies    • Sinusitis, chronic    • Snoring    • Tobacco use     quit 12/10/12 per pt   • Tubulovillous adenoma of rectum 01/01/2006    s/p low anterior resection   • Vitamin D deficiency        Past Surgical History:   Procedure Laterality Date   • Appendectomy     • Av fistula placement Left 05/30/2018     No BPs, lab draws, or IVs Left Arm   • Colon surgery  approx age early 60s    villous tumor of rectal area, surgical resection   • Colonoscopy diagnostic  07/01/2018    repeat July 2021   • Colonoscopy w/ polypectomy  05/26/2009    diverticulosis, colon polyps X 2   • Colonoscopy w/ polypectomy  06/21/2007    polyps X 2   • Colonoscopy w/ polypectomy  06/22/2006    large sessile polyp probably villous adenoma- too large to remove endoscopically. Benign polyps   • Coronary stent placement  06/24/2020    RCA stenosis with ulcerated plaque s/p 3.5 x 18 mm Resolute ISABEL   • Dialysis catheter insertion  04/12/2019    Laparoscopic   • Esophagogastroduodenoscopy  01/18/2019    Hemoclip applied to bleed   • Eye surgery Bilateral prior to 2006    lasik   • Ir insert dialysis cath permanent  11/06/2020   • Joint replacement Left 2007-approx    left total knee replacement   • Joint replacement Right 2009-approx    right total knee replacement   • Pacemaker implant  08/17/2020    Micra single-chamber leadless pacemaker for complete heart block   • Rotator cuff repair Left approx age 60s    left shoulder   • Tonsillectomy and adenoidectomy  childhood   • [place] tooth extraction  age 20s       Social History     Socioeconomic History   • Marital status:      Spouse name: Not on file   • Number of children: Not on file   • Years of education: Not on file   • Highest education level: Not on file   Occupational History   • Occupation: retired   Social Needs   • Financial resource strain: Not hard at all   • Food insecurity      Worry: Never true     Inability: Never true   • Transportation needs     Medical: Yes     Non-medical: No   Tobacco Use   • Smoking status: Former Smoker     Packs/day: 1.00     Years: 12.00     Pack years: 12.00     Types: Cigarettes     Quit date: 2018     Years since quittin.1   • Smokeless tobacco: Current User   • Tobacco comment: Vape   Substance and Sexual Activity   • Alcohol use: Not Currently     Alcohol/week: 1.0 standard drinks     Types: 1 Standard drinks or equivalent per week     Frequency: Monthly or less     Drinks per session: 1 or 2     Binge frequency: Never     Comment: very rare   • Drug use: Never   • Sexual activity: Not Currently   Lifestyle   • Physical activity     Days per week: Not on file     Minutes per session: Not on file   • Stress: Not on file   Relationships   • Social connections     Talks on phone: Not on file     Gets together: Not on file     Attends Islam service: Not on file     Active member of club or organization: Not on file     Attends meetings of clubs or organizations: Not on file     Relationship status: Not on file   • Intimate partner violence     Fear of current or ex partner: Not on file     Emotionally abused: Not on file     Physically abused: Not on file     Forced sexual activity: Not on file   Other Topics Concern   • Not on file   Social History Narrative   • Not on file       Family History   Problem Relation Age of Onset   • Diabetes Mother    • Heart disease Mother    • Kidney disease Mother    • Cancer Father    • Heart disease Father    • Diabetes Father    • Psychiatric Sister         schizophrenic   • Heart disease Sister    • Psychiatric Sister        ALLERGIES:   Allergen Reactions   • Adhesive   (Environmental) RASH     Use Paper tape   • Doxycycline Other (See Comments)     \"Makes sickness worse\"         ROS: rest of ROS reviewed and negative except for what is in the HPI/Subjective Assessment          Vital Last Value 24 Hour Range    Temperature 97.6 °F (36.4 °C) Temp  Min: 97.5 °F (36.4 °C)  Max: 98.6 °F (37 °C)   Pulse 62 Pulse  Min: 59  Max: 72   Respiratory 18 Resp  Min: 16  Max: 18   Blood Pressure 128/58 BP  Min: 92/56  Max: 145/97   Art BP   No data recorded   Pulse Oximetry 94 % SpO2  Min: 90 %  Max: 98 %     Vital Today Admitted   Weight 84 kg Weight: 84 kg   Height N/A Height: 5' 5\" (165.1 cm)   BMI N/A BMI (Calculated): 30.82     Weight over the past 48 Hours:  Patient Vitals for the past 48 hrs:   Weight   11/09/20 1130 85 kg   11/09/20 1450 84 kg        Hemodynamics:      Last Value 24 Hour Range   CVP   No data recorded   PAS/PAD   No data recorded   PCWP   No data recorded   CO   No data recorded   CI   No data recorded   SVR   No data recorded   SV02   No data recorded     Intake/Output:        Physical Exam:    General: no acute distress  HEENT: Head atraumatic, normocephalic.    Neck: Supple, no mass.  Trachea midline.  Chest/Lungs: Normal effort.  Clear to auscultation.    CVS: Regular rate and rhythm. S1,S2 well heard.   Abdomen: Soft, non tender, PD site clean/covered gauze.    Neuro: +LE generalized weakness, debillitation  Skin: Warm, dry, intact.  No rashes. +ulcerations toes  Extremities: +trace LE edema. PVD changes. L forearm +AVF+thrill  Stable exam          Laboratory Results:  Recent Labs   Lab 11/10/20  0441 11/09/20  0434 11/08/20  0429  11/06/20  0544   SODIUM 139 133* 136   < > 131*   POTASSIUM 4.2 6.2* 5.5*   < > 3.9   CHLORIDE 102 99 102   < > 96*   CO2 28 23 24   < > 26   ANIONGAP 13 17 16   < > 13   BUN 25* 43* 33*   < > 38*   CREATININE 3.00* 4.56* 3.83*   < > 3.68*   GLUCOSE 57* 60* 67   < > 161*   CALCIUM 8.9 8.1* 8.1*   < > 7.9*   PHOS 2.8 3.6  --   --  3.5   MG 2.2 2.1 2.1   < > 1.6*    < > = values in this interval not displayed.       Recent Labs   Lab 11/10/20  0441 11/09/20  0434 11/08/20  0429   WBC 10.8 11.1* 15.3*   HGB 7.4* 7.1* 7.5*   HCT 25.6* 23.9* 25.6*    206 227        Imaging/Procedures:    Impression, Plan & Recommendations:    · ESRD: starting on HD transitioning from PD  · Does have LUE AVF, has been functioning ok using 17g needles  · HD tomorrow and then continue MWF  · Not using PD catheter, d/w RN will have them flush with 50cc of sterile normal saline weekly to keep patent  · Elevated WBC: suspect this is r/t her CDiff, also has let hip wound w/ positive cultures noted(pseudomonas). PD fluid cell count NOT elevated. Afebrile. On abx.    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 Afebrile.   · Diarrhea is improving  · Anemia: in ESRD: Fe levels adequate. Continues on epogen per protocol/ increased to 10K 3 x weekly  · Transfuse prbc prn hgb <7. Transfused one unit 11/7/20  · Hyponatremia: improved, d/t poor nutritional/solute intake and diarrhea.    · mixing all IV meds in 0.9 NS as able.   · on NaCl tabs BID, encouraged good nutrition, will d/c NaCl tablets now as on HD  · Na levels holding > 130  · Hyperkalemia: low K bath on HD, renal diet  · Hypotension: on 10 mg midodrine TID.   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 Branden Lopez.      I have personally seen and examined the patient, agree with the history, physical exam, assessment and plan and have made appropriate addendums/modifications in the note above.     Branden Lopez MD

## 2021-02-10 ENCOUNTER — IMMUNIZATION (OUTPATIENT)
Dept: PRIMARY CARE CLINIC | Facility: CLINIC | Age: 71
End: 2021-02-10
Payer: MEDICARE

## 2021-02-10 DIAGNOSIS — Z23 NEED FOR VACCINATION: Primary | ICD-10-CM

## 2021-02-10 PROCEDURE — 0001A PR IMMUNIZ ADMIN, SARS-COV-2 COVID-19 VACC, 30MCG/0.3ML, 1ST DOSE: CPT | Mod: PBBFAC | Performed by: INTERNAL MEDICINE

## 2021-02-10 PROCEDURE — 91300 PR SARS-COV- 2 COVID-19 VACCINE, NO PRSV, 30MCG/0.3ML, IM: CPT | Mod: PBBFAC | Performed by: INTERNAL MEDICINE

## 2021-02-10 RX ADMIN — RNA INGREDIENT BNT-162B2 0.3 ML: 0.23 INJECTION, SUSPENSION INTRAMUSCULAR at 03:02

## 2021-03-05 ENCOUNTER — IMMUNIZATION (OUTPATIENT)
Dept: INTERNAL MEDICINE | Facility: CLINIC | Age: 71
End: 2021-03-05
Payer: MEDICARE

## 2021-03-05 DIAGNOSIS — Z23 NEED FOR VACCINATION: Primary | ICD-10-CM

## 2021-03-05 PROCEDURE — 91300 COVID-19, MRNA, LNP-S, PF, 30 MCG/0.3 ML DOSE VACCINE: CPT | Mod: PBBFAC | Performed by: INTERNAL MEDICINE

## 2021-03-05 PROCEDURE — 0002A COVID-19, MRNA, LNP-S, PF, 30 MCG/0.3 ML DOSE VACCINE: CPT | Mod: PBBFAC | Performed by: INTERNAL MEDICINE

## 2021-03-10 ENCOUNTER — PATIENT MESSAGE (OUTPATIENT)
Dept: PSYCHIATRY | Facility: CLINIC | Age: 71
End: 2021-03-10

## 2021-03-12 ENCOUNTER — OFFICE VISIT (OUTPATIENT)
Dept: PSYCHIATRY | Facility: CLINIC | Age: 71
End: 2021-03-12
Payer: MEDICARE

## 2021-03-12 DIAGNOSIS — F31.9 BIPOLAR I DISORDER, CURRENT EPISODE DEPRESSED: ICD-10-CM

## 2021-03-12 PROCEDURE — 99214 OFFICE O/P EST MOD 30 MIN: CPT | Mod: 95,,, | Performed by: PSYCHIATRY & NEUROLOGY

## 2021-03-12 PROCEDURE — 99214 PR OFFICE/OUTPT VISIT, EST, LEVL IV, 30-39 MIN: ICD-10-PCS | Mod: 95,,, | Performed by: PSYCHIATRY & NEUROLOGY

## 2021-03-12 RX ORDER — DISULFIRAM 250 MG/1
250 TABLET ORAL DAILY
Qty: 30 TABLET | Refills: 5 | Status: SHIPPED | OUTPATIENT
Start: 2021-03-12 | End: 2021-12-29 | Stop reason: SDUPTHER

## 2021-03-12 RX ORDER — ESCITALOPRAM OXALATE 20 MG/1
20 TABLET ORAL DAILY
Qty: 90 TABLET | Refills: 1 | Status: SHIPPED | OUTPATIENT
Start: 2021-03-12 | End: 2021-10-04 | Stop reason: SDUPTHER

## 2021-03-12 RX ORDER — CLONAZEPAM 1 MG/1
1 TABLET ORAL 2 TIMES DAILY
Qty: 180 TABLET | Refills: 1 | Status: SHIPPED | OUTPATIENT
Start: 2021-03-12 | End: 2021-08-11 | Stop reason: SDUPTHER

## 2021-03-12 RX ORDER — GABAPENTIN 300 MG/1
300 CAPSULE ORAL 3 TIMES DAILY PRN
Qty: 270 CAPSULE | Refills: 3 | Status: SHIPPED | OUTPATIENT
Start: 2021-03-12 | End: 2021-09-26

## 2021-08-10 ENCOUNTER — PATIENT MESSAGE (OUTPATIENT)
Dept: PSYCHIATRY | Facility: CLINIC | Age: 71
End: 2021-08-10

## 2021-08-11 DIAGNOSIS — F31.9 BIPOLAR I DISORDER, CURRENT EPISODE DEPRESSED: ICD-10-CM

## 2021-08-11 RX ORDER — CLONAZEPAM 1 MG/1
1 TABLET ORAL 2 TIMES DAILY
Qty: 180 TABLET | Refills: 0 | Status: SHIPPED | OUTPATIENT
Start: 2021-08-11 | End: 2022-11-27 | Stop reason: SDUPTHER

## 2021-09-13 RX ORDER — ESCITALOPRAM OXALATE 20 MG/1
30 TABLET ORAL DAILY
Qty: 45 TABLET | Refills: 1 | Status: SHIPPED | OUTPATIENT
Start: 2021-09-13 | End: 2021-11-12

## 2021-09-13 RX ORDER — ZALEPLON 10 MG/1
10 CAPSULE ORAL NIGHTLY
Qty: 30 CAPSULE | Refills: 0 | Status: SHIPPED | OUTPATIENT
Start: 2021-09-13 | End: 2021-10-04

## 2021-10-04 ENCOUNTER — OFFICE VISIT (OUTPATIENT)
Dept: PSYCHIATRY | Facility: CLINIC | Age: 71
End: 2021-10-04
Payer: MEDICARE

## 2021-10-04 VITALS
DIASTOLIC BLOOD PRESSURE: 81 MMHG | HEART RATE: 93 BPM | BODY MASS INDEX: 30.01 KG/M2 | SYSTOLIC BLOOD PRESSURE: 144 MMHG | WEIGHT: 191.56 LBS

## 2021-10-04 DIAGNOSIS — F31.30 BIPOLAR I DISORDER, MOST RECENT EPISODE DEPRESSED: Primary | ICD-10-CM

## 2021-10-04 DIAGNOSIS — F11.11 OPIOID USE DISORDER, MILD, IN SUSTAINED REMISSION: ICD-10-CM

## 2021-10-04 DIAGNOSIS — F10.21 ALCOHOL USE DISORDER, SEVERE, IN SUSTAINED REMISSION: ICD-10-CM

## 2021-10-04 PROCEDURE — 99214 PR OFFICE/OUTPT VISIT, EST, LEVL IV, 30-39 MIN: ICD-10-PCS | Mod: S$PBB,,, | Performed by: PSYCHIATRY & NEUROLOGY

## 2021-10-04 PROCEDURE — 99999 PR PBB SHADOW E&M-EST. PATIENT-LVL II: ICD-10-PCS | Mod: PBBFAC,,, | Performed by: PSYCHIATRY & NEUROLOGY

## 2021-10-04 PROCEDURE — 99214 OFFICE O/P EST MOD 30 MIN: CPT | Mod: S$PBB,,, | Performed by: PSYCHIATRY & NEUROLOGY

## 2021-10-04 PROCEDURE — 99212 OFFICE O/P EST SF 10 MIN: CPT | Mod: PBBFAC | Performed by: PSYCHIATRY & NEUROLOGY

## 2021-10-04 PROCEDURE — 99999 PR PBB SHADOW E&M-EST. PATIENT-LVL II: CPT | Mod: PBBFAC,,, | Performed by: PSYCHIATRY & NEUROLOGY

## 2021-10-04 RX ORDER — ESCITALOPRAM OXALATE 20 MG/1
30 TABLET ORAL DAILY
Qty: 135 TABLET | Refills: 1 | Status: SHIPPED | OUTPATIENT
Start: 2021-10-04 | End: 2022-11-27 | Stop reason: SDUPTHER

## 2021-10-04 RX ORDER — BUPROPION HYDROCHLORIDE 75 MG/1
75 TABLET ORAL 3 TIMES DAILY
Qty: 90 TABLET | Refills: 1 | Status: SHIPPED | OUTPATIENT
Start: 2021-10-04 | End: 2021-10-28 | Stop reason: SDUPTHER

## 2021-10-04 RX ORDER — GABAPENTIN 300 MG/1
300 CAPSULE ORAL 3 TIMES DAILY PRN
Qty: 270 CAPSULE | Refills: 1 | Status: SHIPPED | OUTPATIENT
Start: 2021-10-04 | End: 2023-02-08

## 2021-10-15 ENCOUNTER — OFFICE VISIT (OUTPATIENT)
Dept: PSYCHIATRY | Facility: CLINIC | Age: 71
End: 2021-10-15
Payer: MEDICARE

## 2021-10-15 DIAGNOSIS — F31.30 BIPOLAR I DISORDER, MOST RECENT EPISODE DEPRESSED: Primary | ICD-10-CM

## 2021-10-15 DIAGNOSIS — F10.21 ALCOHOL USE DISORDER, SEVERE, IN SUSTAINED REMISSION: ICD-10-CM

## 2021-10-15 PROCEDURE — 99213 OFFICE O/P EST LOW 20 MIN: CPT | Mod: 95,,, | Performed by: PSYCHIATRY & NEUROLOGY

## 2021-10-15 PROCEDURE — 99213 PR OFFICE/OUTPT VISIT, EST, LEVL III, 20-29 MIN: ICD-10-PCS | Mod: 95,,, | Performed by: PSYCHIATRY & NEUROLOGY

## 2021-10-28 ENCOUNTER — PATIENT MESSAGE (OUTPATIENT)
Dept: PSYCHIATRY | Facility: CLINIC | Age: 71
End: 2021-10-28
Payer: MEDICARE

## 2021-10-28 RX ORDER — BUPROPION HYDROCHLORIDE 75 MG/1
75 TABLET ORAL 3 TIMES DAILY
Qty: 270 TABLET | Refills: 3 | Status: SHIPPED | OUTPATIENT
Start: 2021-10-28 | End: 2022-11-27

## 2021-12-27 ENCOUNTER — PATIENT MESSAGE (OUTPATIENT)
Dept: PSYCHIATRY | Facility: CLINIC | Age: 71
End: 2021-12-27
Payer: MEDICARE

## 2021-12-29 RX ORDER — DISULFIRAM 250 MG/1
250 TABLET ORAL DAILY
Qty: 90 TABLET | Refills: 3 | Status: SHIPPED | OUTPATIENT
Start: 2021-12-29 | End: 2022-11-27

## 2022-08-17 ENCOUNTER — PATIENT MESSAGE (OUTPATIENT)
Dept: ADMINISTRATIVE | Facility: HOSPITAL | Age: 72
End: 2022-08-17
Payer: MEDICARE

## 2022-08-17 ENCOUNTER — PATIENT OUTREACH (OUTPATIENT)
Dept: ADMINISTRATIVE | Facility: HOSPITAL | Age: 72
End: 2022-08-17
Payer: MEDICARE

## 2022-08-17 NOTE — PROGRESS NOTES
[facility] CMS chart audits-PCP VISIT/ANNUAL WELLNESS VISIT. Chart review completed for HM test overdue (mammograms, Colonoscopies, pap smears, DM labs, and/or EYE EXAMs)      Care Everywhere and media, updates requested and reviewed.      RE:  Patient needs PCP Visit-2022    Message sent to patient's portal.

## 2022-11-13 ENCOUNTER — PATIENT MESSAGE (OUTPATIENT)
Dept: PSYCHIATRY | Facility: CLINIC | Age: 72
End: 2022-11-13
Payer: MEDICARE

## 2022-11-27 DIAGNOSIS — F31.9 BIPOLAR I DISORDER, CURRENT EPISODE DEPRESSED: ICD-10-CM

## 2022-11-27 RX ORDER — CLONAZEPAM 1 MG/1
1 TABLET ORAL 2 TIMES DAILY
Qty: 180 TABLET | Refills: 0 | Status: SHIPPED | OUTPATIENT
Start: 2022-11-27 | End: 2023-02-24 | Stop reason: SDUPTHER

## 2022-11-27 RX ORDER — ESCITALOPRAM OXALATE 20 MG/1
30 TABLET ORAL DAILY
Qty: 135 TABLET | Refills: 0 | Status: SHIPPED | OUTPATIENT
Start: 2022-11-27 | End: 2023-02-24 | Stop reason: SDUPTHER

## 2023-02-08 ENCOUNTER — PATIENT MESSAGE (OUTPATIENT)
Dept: PSYCHIATRY | Facility: CLINIC | Age: 73
End: 2023-02-08
Payer: MEDICARE

## 2023-02-24 ENCOUNTER — OFFICE VISIT (OUTPATIENT)
Dept: PSYCHIATRY | Facility: CLINIC | Age: 73
End: 2023-02-24
Payer: MEDICARE

## 2023-02-24 DIAGNOSIS — F10.21 ALCOHOL USE DISORDER, SEVERE, IN SUSTAINED REMISSION: ICD-10-CM

## 2023-02-24 DIAGNOSIS — F31.30 BIPOLAR I DISORDER, MOST RECENT EPISODE DEPRESSED: Primary | ICD-10-CM

## 2023-02-24 DIAGNOSIS — F11.11 OPIOID USE DISORDER, MILD, IN SUSTAINED REMISSION: ICD-10-CM

## 2023-02-24 DIAGNOSIS — H93.13 TINNITUS, BILATERAL: ICD-10-CM

## 2023-02-24 PROCEDURE — 99214 OFFICE O/P EST MOD 30 MIN: CPT | Mod: 95,,, | Performed by: PSYCHIATRY & NEUROLOGY

## 2023-02-24 PROCEDURE — 99214 PR OFFICE/OUTPT VISIT, EST, LEVL IV, 30-39 MIN: ICD-10-PCS | Mod: 95,,, | Performed by: PSYCHIATRY & NEUROLOGY

## 2023-02-24 RX ORDER — ESCITALOPRAM OXALATE 20 MG/1
30 TABLET ORAL DAILY
Qty: 135 TABLET | Refills: 1 | Status: SHIPPED | OUTPATIENT
Start: 2023-02-24 | End: 2023-06-15 | Stop reason: SDUPTHER

## 2023-02-24 RX ORDER — CLONAZEPAM 1 MG/1
1 TABLET ORAL 2 TIMES DAILY
Qty: 180 TABLET | Refills: 1 | Status: SHIPPED | OUTPATIENT
Start: 2023-02-24 | End: 2024-01-23
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
## 2023-06-15 DIAGNOSIS — F31.30 BIPOLAR I DISORDER, MOST RECENT EPISODE DEPRESSED: ICD-10-CM

## 2023-06-15 RX ORDER — CLONAZEPAM 1 MG/1
1 TABLET ORAL 2 TIMES DAILY
Qty: 180 TABLET | Refills: 1 | Status: CANCELLED | OUTPATIENT
Start: 2023-06-15 | End: 2023-09-13

## 2023-06-15 RX ORDER — GABAPENTIN 300 MG/1
300 CAPSULE ORAL 3 TIMES DAILY
Qty: 270 CAPSULE | Refills: 0 | Status: SHIPPED | OUTPATIENT
Start: 2023-06-15 | End: 2024-01-23

## 2023-06-15 RX ORDER — ESCITALOPRAM OXALATE 20 MG/1
30 TABLET ORAL DAILY
Qty: 135 TABLET | Refills: 0 | Status: SHIPPED | OUTPATIENT
Start: 2023-06-15 | End: 2023-06-16 | Stop reason: SDUPTHER

## 2023-06-16 RX ORDER — ESCITALOPRAM OXALATE 20 MG/1
20 TABLET ORAL DAILY
Qty: 90 TABLET | Refills: 3 | Status: SHIPPED | OUTPATIENT
Start: 2023-06-16 | End: 2024-06-10

## 2023-06-16 NOTE — TELEPHONE ENCOUNTER
----- Message from Keeley Acevedo sent at 6/16/2023 10:43 AM CDT -----  Regarding: Medication  Pt requests a refill of EScitalopram oxalate (LEXAPRO) 20 MG tablet.  Pt says it needs to be changed to dispense 90, 1 daily and three refills from [facility] - [place], [place] - [address] [place] [address], Phone: [phone], Fax:  [phone] because the insurance company will not pay for 135 tablets.  Last seen on 2/24/23 with no future scheduled appts.  Pt says this is the second or third time she's tried to get it right.    She can be reached at [phone].    Thank you.

## 2024-01-23 ENCOUNTER — OFFICE VISIT (OUTPATIENT)
Dept: URGENT CARE | Facility: CLINIC | Age: 74
End: 2024-01-23
Payer: MEDICARE

## 2024-01-23 VITALS
RESPIRATION RATE: 16 BRPM | WEIGHT: 191 LBS | HEART RATE: 88 BPM | SYSTOLIC BLOOD PRESSURE: 109 MMHG | TEMPERATURE: 99 F | OXYGEN SATURATION: 97 % | DIASTOLIC BLOOD PRESSURE: 75 MMHG | BODY MASS INDEX: 29.91 KG/M2

## 2024-01-23 DIAGNOSIS — R52 BODY ACHES: ICD-10-CM

## 2024-01-23 DIAGNOSIS — J06.9 VIRAL URI: Primary | ICD-10-CM

## 2024-01-23 LAB
CTP QC/QA: YES
CTP QC/QA: YES
POC MOLECULAR INFLUENZA A AGN: NEGATIVE
POC MOLECULAR INFLUENZA B AGN: NEGATIVE
SARS-COV-2 AG RESP QL IA.RAPID: NEGATIVE

## 2024-01-23 PROCEDURE — 99203 OFFICE O/P NEW LOW 30 MIN: CPT | Mod: S$GLB,,,

## 2024-01-23 PROCEDURE — 87502 INFLUENZA DNA AMP PROBE: CPT | Mod: QW,S$GLB,,

## 2024-01-23 PROCEDURE — 87811 SARS-COV-2 COVID19 W/OPTIC: CPT | Mod: QW,S$GLB,,

## 2024-01-23 NOTE — PROGRESS NOTES
Subjective:      Patient ID: Martha Garner is a 73 y.o. female.    Vitals:  weight is 86.6 kg (191 lb). Her oral temperature is 98.7 °F (37.1 °C). Her blood pressure is 109/75 and her pulse is 88. Her respiration is 16 and oxygen saturation is 97%.     Chief Complaint: Generalized Body Aches (/) and Nasal Congestion (/)    This is a 73 y.o. female who presents today with a chief complaint of fever, body aches, cough, nasal congestionx 3 days     Home Tx: Tylenol       Other  The current episode started in the past 7 days. The problem has been gradually worsening. Associated symptoms include abdominal pain, congestion, coughing and a fever. Pertinent negatives include no chest pain, chills or sore throat. Associated symptoms comments: Lower left abdominal pain.       Constitution: Positive for fever. Negative for chills.   HENT:  Positive for congestion. Negative for sore throat.    Cardiovascular:  Negative for chest pain.   Respiratory:  Positive for cough.    Gastrointestinal:  Positive for abdominal pain.   Neurological:  Negative for disorientation and altered mental status.   Psychiatric/Behavioral:  Negative for altered mental status and disorientation.       Objective:     Physical Exam   Constitutional: She is oriented to person, place, and time. She appears well-developed. She is cooperative.  Non-toxic appearance. She does not appear ill. No distress.      Comments:Patient sits comfortably in exam chair. Answers questions in complete sentences. Does not show any signs of distress or discoloration.        HENT:   Head: Normocephalic and atraumatic.   Ears:   Right Ear: Hearing, tympanic membrane, external ear and ear canal normal. impacted cerumen  Left Ear: Hearing, tympanic membrane, external ear and ear canal normal. impacted cerumen  Nose: Rhinorrhea and congestion present. No mucosal edema or nasal deformity. No epistaxis. Right sinus exhibits no maxillary sinus tenderness and no frontal sinus  tenderness. Left sinus exhibits no maxillary sinus tenderness and no frontal sinus tenderness.   Mouth/Throat: Uvula is midline, oropharynx is clear and moist and mucous membranes are normal. No trismus in the jaw. Normal dentition. No uvula swelling. No oropharyngeal exudate, posterior oropharyngeal edema or posterior oropharyngeal erythema. No tonsillar exudate.   Eyes: Conjunctivae and lids are normal. No scleral icterus.   Neck: Trachea normal and phonation normal. Neck supple. No edema present. No erythema present. No neck rigidity present.   Cardiovascular: Normal rate, regular rhythm, normal heart sounds and normal pulses.   Pulmonary/Chest: Effort normal and breath sounds normal. No stridor. No respiratory distress. She has no decreased breath sounds. She has no wheezes. She has no rhonchi. She has no rales.   Abdominal: Normal appearance.   Musculoskeletal: Normal range of motion.         General: No deformity. Normal range of motion.   Lymphadenopathy:     She has no cervical adenopathy.        Right cervical: No superficial cervical, no deep cervical and no posterior cervical adenopathy present.       Left cervical: No superficial cervical, no deep cervical and no posterior cervical adenopathy present.   Neurological: She is alert and oriented to person, place, and time. She exhibits normal muscle tone. Coordination normal.   Skin: Skin is warm, dry, intact, not diaphoretic and not pale.   Psychiatric: Her speech is normal and behavior is normal. Judgment and thought content normal.   Nursing note and vitals reviewed.    Results for orders placed or performed in visit on 01/23/24   POCT Influenza A/B MOLECULAR   Result Value Ref Range    POC Molecular Influenza A Ag Negative Negative, Not Reported    POC Molecular Influenza B Ag Negative Negative, Not Reported     Acceptable Yes    SARS Coronavirus 2 Antigen, POCT Manual Read   Result Value Ref Range    SARS Coronavirus 2 Antigen Negative  Negative     Acceptable Yes        Assessment:     1. Viral URI    2. Body aches        Plan:       Viral URI    Body aches  -     POCT Influenza A/B MOLECULAR  -     SARS Coronavirus 2 Antigen, POCT Manual Read                Patient Instructions   - Rest.    - Drink plenty of fluids. Increasing your fluid intake will help loosen up mucous.  - Viral upper respiratory infections typically run their course in 10-14 days.     - You can take Delsym to help with cough. This is safe for patients with high blood pressure.      - You can use Flonase (fluticasone) nasal spray as directed for sinus congestion and postnasal drip. This is a steroid nasal spray that works locally over time to decrease the inflammation in your nose/sinuses and help with allergic symptoms. This is not an quick- relief spray like afrin, but it works well if used daily.  Discontinue if you develop nose bleed  - Use nasal saline prior to Flonase.  - Use Ocean Spray Nasal Saline 1-3 puffs each nostril every 2-3 hours then blow out onto tissue. This is to irrigate the nasal passage way to clear the sinus openings. Use until sinus problem resolved.    - A Neti Pot with sterile saline can help break up nasal congestion and give relief.      - Chloraseptic throat spray can help numb the throat.     - Warm salt water gargles can help with sore throat.  - Warm tea with honey can help with sore throat and cough. Honey is a natural cough suppressant.    You are considered contagious until you have been fever free for over 24 hours without the use of a fever reducer such as tylenol or ibuprofen. You should stay away from other people during this time.     - Acetaminophen (tylenol) or Ibuprofen (advil,motrin) as directed as needed for fever/pain. Avoid tylenol if you have a history of liver disease. Do not take ibuprofen if you have a history of GI bleeding, kidney disease, or if you take blood thinners.   - Ibuprofen dosing for adults: 400 mg  by mouth every 4-6 hours as needed. Max: 2400 mg/day; Info: use lowest effective dose, shortest effective treatment duration; give w/ food if GI upset occurs.  - Tylenol dosing for adults: [By mouth route, immediate-release form] Dose: 325-1000 mg by mouth every 4-6h as needed; Max: 1 g/4h and 4 g/day from all sources. [By mouth route, extended-release form] Dose: 650-1300 mg Extended Release by mouth every 8h as needed; Max: 4 g/day from all sources.     - You must understand that you have received an Urgent Care treatment only and that you may be released before all of your medical problems are known or treated.   - You, the patient, will arrange for follow up care as instructed.   - If your condition worsens or fails to improve we recommend that you receive another evaluation at the ER immediately or contact your PCP to discuss your concerns or return here.   - Follow up with your PCP or specialty clinic as directed in the next 1-2 weeks if not improved or as needed.  You can call [phone] to schedule an appointment with the appropriate provider.    If your symptoms do not improve or worsen, go to the emergency room immediately.

## 2024-01-23 NOTE — PATIENT INSTRUCTIONS
- Rest.    - Drink plenty of fluids. Increasing your fluid intake will help loosen up mucous.  - Viral upper respiratory infections typically run their course in 10-14 days.     - You can take Delsym to help with cough. This is safe for patients with high blood pressure.      - You can use Flonase (fluticasone) nasal spray as directed for sinus congestion and postnasal drip. This is a steroid nasal spray that works locally over time to decrease the inflammation in your nose/sinuses and help with allergic symptoms. This is not an quick- relief spray like afrin, but it works well if used daily.  Discontinue if you develop nose bleed  - Use nasal saline prior to Flonase.  - Use Ocean Spray Nasal Saline 1-3 puffs each nostril every 2-3 hours then blow out onto tissue. This is to irrigate the nasal passage way to clear the sinus openings. Use until sinus problem resolved.    - A Neti Pot with sterile saline can help break up nasal congestion and give relief.      - Chloraseptic throat spray can help numb the throat.     - Warm salt water gargles can help with sore throat.  - Warm tea with honey can help with sore throat and cough. Honey is a natural cough suppressant.    You are considered contagious until you have been fever free for over 24 hours without the use of a fever reducer such as tylenol or ibuprofen. You should stay away from other people during this time.     - Acetaminophen (tylenol) or Ibuprofen (advil,motrin) as directed as needed for fever/pain. Avoid tylenol if you have a history of liver disease. Do not take ibuprofen if you have a history of GI bleeding, kidney disease, or if you take blood thinners.   - Ibuprofen dosing for adults: 400 mg by mouth every 4-6 hours as needed. Max: 2400 mg/day; Info: use lowest effective dose, shortest effective treatment duration; give w/ food if GI upset occurs.  - Tylenol dosing for adults: [By mouth route, immediate-release form] Dose: 325-1000 mg by mouth every  4-6h as needed; Max: 1 g/4h and 4 g/day from all sources. [By mouth route, extended-release form] Dose: 650-1300 mg Extended Release by mouth every 8h as needed; Max: 4 g/day from all sources.     - You must understand that you have received an Urgent Care treatment only and that you may be released before all of your medical problems are known or treated.   - You, the patient, will arrange for follow up care as instructed.   - If your condition worsens or fails to improve we recommend that you receive another evaluation at the ER immediately or contact your PCP to discuss your concerns or return here.   - Follow up with your PCP or specialty clinic as directed in the next 1-2 weeks if not improved or as needed.  You can call [phone] to schedule an appointment with the appropriate provider.    If your symptoms do not improve or worsen, go to the emergency room immediately.

## 2024-08-02 ENCOUNTER — OFFICE VISIT (OUTPATIENT)
Dept: PSYCHIATRY | Facility: CLINIC | Age: 74
End: 2024-08-02
Payer: MEDICARE

## 2024-08-02 DIAGNOSIS — F31.30 BIPOLAR I DISORDER, MOST RECENT EPISODE DEPRESSED: ICD-10-CM

## 2024-08-02 PROCEDURE — 99213 OFFICE O/P EST LOW 20 MIN: CPT | Mod: S$PBB,,, | Performed by: PSYCHIATRY & NEUROLOGY

## 2024-08-02 PROCEDURE — 99211 OFF/OP EST MAY X REQ PHY/QHP: CPT | Mod: PBBFAC | Performed by: PSYCHIATRY & NEUROLOGY

## 2024-08-02 PROCEDURE — 99999 PR PBB SHADOW E&M-EST. PATIENT-LVL I: CPT | Mod: PBBFAC,,, | Performed by: PSYCHIATRY & NEUROLOGY

## 2024-08-02 RX ORDER — DICYCLOMINE HYDROCHLORIDE 20 MG/1
20 TABLET ORAL EVERY 6 HOURS
COMMUNITY

## 2024-08-02 RX ORDER — ESCITALOPRAM OXALATE 20 MG/1
20 TABLET ORAL DAILY
Qty: 90 TABLET | Refills: 3 | Status: SHIPPED | OUTPATIENT
Start: 2024-08-02 | End: 2025-07-28

## 2024-08-02 RX ORDER — CLONAZEPAM 1 MG/1
1 TABLET ORAL 2 TIMES DAILY
Qty: 180 TABLET | Refills: 1 | Status: SHIPPED | OUTPATIENT
Start: 2024-08-02

## 2024-08-02 RX ORDER — CHLORTHALIDONE 25 MG/1
25 TABLET ORAL DAILY
COMMUNITY

## 2024-08-02 RX ORDER — DENOSUMAB 60 MG/ML
60 INJECTION SUBCUTANEOUS
COMMUNITY
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
## 2024-08-15 ENCOUNTER — OFFICE VISIT (OUTPATIENT)
Dept: PSYCHIATRY | Facility: CLINIC | Age: 74
End: 2024-08-15
Payer: MEDICARE

## 2024-08-15 DIAGNOSIS — F11.11 OPIOID USE DISORDER, MILD, IN SUSTAINED REMISSION: Primary | ICD-10-CM

## 2024-08-15 DIAGNOSIS — F31.61 BIPOLAR DISORDER, CURRENT EPISODE MIXED, MILD: ICD-10-CM

## 2024-08-15 DIAGNOSIS — F10.21 ALCOHOL USE DISORDER, SEVERE, IN SUSTAINED REMISSION: ICD-10-CM

## 2024-08-15 RX ORDER — GABAPENTIN 300 MG/1
300 CAPSULE ORAL 3 TIMES DAILY
Qty: 270 CAPSULE | Refills: 3 | Status: SHIPPED | OUTPATIENT
Start: 2024-08-15

## 2024-08-15 RX ORDER — OLANZAPINE 10 MG/1
10 TABLET ORAL NIGHTLY
Qty: 90 TABLET | Refills: 3 | Status: SHIPPED | OUTPATIENT
Start: 2024-08-15
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
## 2024-08-21 ENCOUNTER — TELEPHONE (OUTPATIENT)
Dept: PSYCHIATRY | Facility: CLINIC | Age: 74
End: 2024-08-21
Payer: MEDICARE

## 2024-08-28 ENCOUNTER — OFFICE VISIT (OUTPATIENT)
Dept: PSYCHIATRY | Facility: CLINIC | Age: 74
End: 2024-08-28
Payer: MEDICARE

## 2024-08-28 DIAGNOSIS — F10.21 ALCOHOL USE DISORDER, SEVERE, IN SUSTAINED REMISSION: ICD-10-CM

## 2024-08-28 DIAGNOSIS — F11.11 OPIOID USE DISORDER, MILD, IN SUSTAINED REMISSION: ICD-10-CM

## 2024-08-28 DIAGNOSIS — F31.61 BIPOLAR DISORDER, CURRENT EPISODE MIXED, MILD: Primary | ICD-10-CM

## 2024-08-28 PROCEDURE — 99213 OFFICE O/P EST LOW 20 MIN: CPT | Mod: 95,,, | Performed by: PSYCHIATRY & NEUROLOGY
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
## 2024-10-02 ENCOUNTER — OFFICE VISIT (OUTPATIENT)
Dept: PSYCHIATRY | Facility: CLINIC | Age: 74
End: 2024-10-02
Payer: MEDICARE

## 2024-10-02 DIAGNOSIS — F31.30 BIPOLAR I DISORDER, MOST RECENT EPISODE DEPRESSED: Primary | ICD-10-CM

## 2024-10-02 DIAGNOSIS — F11.11 OPIOID USE DISORDER, MILD, IN SUSTAINED REMISSION: ICD-10-CM

## 2024-10-02 DIAGNOSIS — F10.21 ALCOHOL USE DISORDER, SEVERE, IN SUSTAINED REMISSION: ICD-10-CM

## 2024-10-02 PROCEDURE — 99214 OFFICE O/P EST MOD 30 MIN: CPT | Mod: 95,,, | Performed by: PSYCHIATRY & NEUROLOGY
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
## 2024-10-14 RX ORDER — ZALEPLON 10 MG/1
10 CAPSULE ORAL NIGHTLY
Qty: 30 CAPSULE | Refills: 0 | Status: SHIPPED | OUTPATIENT
Start: 2024-10-14 | End: 2024-11-13

## 2024-10-15 ENCOUNTER — TELEPHONE (OUTPATIENT)
Dept: PSYCHIATRY | Facility: CLINIC | Age: 74
End: 2024-10-15
Payer: MEDICARE

## 2024-10-15 ENCOUNTER — OFFICE VISIT (OUTPATIENT)
Dept: PSYCHIATRY | Facility: CLINIC | Age: 74
End: 2024-10-15
Payer: MEDICARE

## 2024-10-15 DIAGNOSIS — F10.21 ALCOHOL USE DISORDER, SEVERE, IN SUSTAINED REMISSION: ICD-10-CM

## 2024-10-15 DIAGNOSIS — F31.30 BIPOLAR I DISORDER, MOST RECENT EPISODE DEPRESSED: Primary | ICD-10-CM

## 2024-10-15 DIAGNOSIS — F41.9 ANXIETY: ICD-10-CM

## 2024-10-15 DIAGNOSIS — F11.11 OPIOID USE DISORDER, MILD, IN SUSTAINED REMISSION: ICD-10-CM

## 2024-10-15 PROCEDURE — 99213 OFFICE O/P EST LOW 20 MIN: CPT | Mod: 95,,, | Performed by: PSYCHIATRY & NEUROLOGY
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
## 2024-12-27 DIAGNOSIS — F51.01 PRIMARY INSOMNIA: Primary | ICD-10-CM

## 2024-12-27 RX ORDER — ZALEPLON 10 MG/1
10 CAPSULE ORAL NIGHTLY
Qty: 30 CAPSULE | Refills: 1 | Status: SHIPPED | OUTPATIENT
Start: 2024-12-27

## 2025-02-10 RX ORDER — BUPROPION HYDROCHLORIDE 75 MG/1
75 TABLET ORAL 3 TIMES DAILY
Qty: 90 TABLET | Refills: 1 | Status: SHIPPED | OUTPATIENT
Start: 2025-02-10 | End: 2026-02-10

## 2025-02-26 ENCOUNTER — OFFICE VISIT (OUTPATIENT)
Dept: PSYCHIATRY | Facility: CLINIC | Age: 75
End: 2025-02-26
Payer: MEDICARE

## 2025-02-26 DIAGNOSIS — M54.9 BACK PAIN, UNSPECIFIED BACK LOCATION, UNSPECIFIED BACK PAIN LATERALITY, UNSPECIFIED CHRONICITY: ICD-10-CM

## 2025-02-26 DIAGNOSIS — F31.9 BIPOLAR AFFECTIVE DISORDER, REMISSION STATUS UNSPECIFIED: Primary | ICD-10-CM

## 2025-02-26 DIAGNOSIS — F51.01 PRIMARY INSOMNIA: ICD-10-CM

## 2025-02-26 RX ORDER — BUPROPION HYDROCHLORIDE 75 MG/1
75 TABLET ORAL 2 TIMES DAILY
Qty: 180 TABLET | Refills: 1 | Status: SHIPPED | OUTPATIENT
Start: 2025-02-26

## 2025-02-26 RX ORDER — OLANZAPINE 5 MG/1
5 TABLET ORAL NIGHTLY
Qty: 90 TABLET | Refills: 1 | Status: SHIPPED | OUTPATIENT
Start: 2025-02-26

## 2025-02-26 RX ORDER — CLONAZEPAM 1 MG/1
1 TABLET ORAL 2 TIMES DAILY
Qty: 180 TABLET | Refills: 1 | Status: SHIPPED | OUTPATIENT
Start: 2025-02-26

## 2025-02-26 RX ORDER — ZALEPLON 10 MG/1
10 CAPSULE ORAL NIGHTLY
Qty: 90 CAPSULE | Refills: 1 | Status: SHIPPED | OUTPATIENT
Start: 2025-02-26

## 2025-02-26 RX ORDER — GABAPENTIN 300 MG/1
300 CAPSULE ORAL 2 TIMES DAILY
Qty: 180 CAPSULE | Refills: 1 | Status: SHIPPED | OUTPATIENT
Start: 2025-02-26
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
## 2025-07-09 DIAGNOSIS — F51.01 PRIMARY INSOMNIA: ICD-10-CM

## 2025-07-09 DIAGNOSIS — M54.9 BACK PAIN, UNSPECIFIED BACK LOCATION, UNSPECIFIED BACK PAIN LATERALITY, UNSPECIFIED CHRONICITY: ICD-10-CM

## 2025-07-09 DIAGNOSIS — F31.9 BIPOLAR AFFECTIVE DISORDER, REMISSION STATUS UNSPECIFIED: ICD-10-CM

## 2025-07-10 DIAGNOSIS — F31.9 BIPOLAR AFFECTIVE DISORDER, REMISSION STATUS UNSPECIFIED: ICD-10-CM

## 2025-07-10 DIAGNOSIS — M54.9 BACK PAIN, UNSPECIFIED BACK LOCATION, UNSPECIFIED BACK PAIN LATERALITY, UNSPECIFIED CHRONICITY: ICD-10-CM

## 2025-07-10 RX ORDER — ESCITALOPRAM OXALATE 20 MG/1
20 TABLET ORAL DAILY
Qty: 90 TABLET | Refills: 0 | Status: SHIPPED | OUTPATIENT
Start: 2025-07-10 | End: 2025-07-10 | Stop reason: SDUPTHER

## 2025-07-10 RX ORDER — CLONAZEPAM 1 MG/1
1 TABLET ORAL 2 TIMES DAILY
Qty: 180 TABLET | Refills: 0 | Status: SHIPPED | OUTPATIENT
Start: 2025-07-10

## 2025-07-10 RX ORDER — GABAPENTIN 300 MG/1
300 CAPSULE ORAL 2 TIMES DAILY
Qty: 180 CAPSULE | Refills: 0 | Status: SHIPPED | OUTPATIENT
Start: 2025-07-10 | End: 2025-07-10 | Stop reason: SDUPTHER

## 2025-07-10 RX ORDER — CLONAZEPAM 1 MG/1
1 TABLET ORAL 2 TIMES DAILY
Qty: 180 TABLET | Refills: 0 | Status: SHIPPED | OUTPATIENT
Start: 2025-07-10 | End: 2025-07-10 | Stop reason: SDUPTHER

## 2025-07-10 RX ORDER — ESCITALOPRAM OXALATE 20 MG/1
20 TABLET ORAL DAILY
Qty: 90 TABLET | Refills: 0 | Status: SHIPPED | OUTPATIENT
Start: 2025-07-10 | End: 2026-07-05

## 2025-07-10 RX ORDER — GABAPENTIN 300 MG/1
300 CAPSULE ORAL 2 TIMES DAILY
Qty: 180 CAPSULE | Refills: 0 | Status: SHIPPED | OUTPATIENT
Start: 2025-07-10

## 2025-07-10 RX ORDER — ZALEPLON 10 MG/1
10 CAPSULE ORAL NIGHTLY
Qty: 90 CAPSULE | Refills: 0 | Status: SHIPPED | OUTPATIENT
Start: 2025-07-10

## 2025-08-26 ENCOUNTER — PATIENT MESSAGE (OUTPATIENT)
Dept: PSYCHIATRY | Facility: CLINIC | Age: 75
End: 2025-08-26
Payer: MEDICARE

## 2025-08-26 DIAGNOSIS — F51.01 PRIMARY INSOMNIA: ICD-10-CM

## 2025-08-26 DIAGNOSIS — M54.9 BACK PAIN, UNSPECIFIED BACK LOCATION, UNSPECIFIED BACK PAIN LATERALITY, UNSPECIFIED CHRONICITY: ICD-10-CM

## 2025-08-26 RX ORDER — GABAPENTIN 300 MG/1
300 CAPSULE ORAL 3 TIMES DAILY
Qty: 270 CAPSULE | Refills: 0 | Status: SHIPPED | OUTPATIENT
Start: 2025-08-26

## 2025-08-26 RX ORDER — ZALEPLON 10 MG/1
10 CAPSULE ORAL NIGHTLY
Qty: 90 CAPSULE | Refills: 0 | Status: SHIPPED | OUTPATIENT
Start: 2025-08-26

## (undated) DEVICE — PAD CAST SPECIALIST STRL 4

## (undated) DEVICE — BIT DRILL FAST 2 MM

## (undated) DEVICE — GLOVE BIOGEL SKINSENSE PI 7.5

## (undated) DEVICE — DRESSING GAUZE 6PLY 4X4

## (undated) DEVICE — BUCKET PLASTER DISPOSABLE

## (undated) DEVICE — SYR B-D DISP CONTROL 10CC100/C

## (undated) DEVICE — ALCOHOL 70% ISOP W/GREEN 16OZ

## (undated) DEVICE — PACK UPPER EXTREMITY BAPTIST

## (undated) DEVICE — BLADE SURG STAINLESS STEEL #15

## (undated) DEVICE — SPLINT PLASTER F.S 4INX15IN

## (undated) DEVICE — SPLINT PLASTER FAST SET 5X30IN

## (undated) DEVICE — GLOVE BIOGEL SKINSENSE PI 6.5

## (undated) DEVICE — PAD UNDERPAD 30X30

## (undated) DEVICE — SEE MEDLINE ITEM 157131

## (undated) DEVICE — SLING ARM SMALL FOAM STRAP

## (undated) DEVICE — UNDERGLOVE BIOGEL PI SZ 6.5 LF

## (undated) DEVICE — APPLICATOR CHLORAPREP ORN 26ML

## (undated) DEVICE — WIRE KIRSCHNER 1.6MM 6IN SS
Type: IMPLANTABLE DEVICE | Site: WRIST | Status: NON-FUNCTIONAL
Removed: 2019-01-18

## (undated) DEVICE — PADDING CAST 4IN SPECIALIST

## (undated) DEVICE — SOL 9P NACL IRR PIC IL

## (undated) DEVICE — NDL HYPO REG 25G X 1 1/2

## (undated) DEVICE — UNDERGLOVES BIOGEL PI SIZE 8

## (undated) DEVICE — BIT DRILL DISTAL RADIUS 2.5 MM

## (undated) DEVICE — DRAPE MINI C-ARM

## (undated) DEVICE — BANDAGE ELASTIC 3X5 VELCRO ST

## (undated) DEVICE — TOURNIQUET SB QC SP 18X4IN

## (undated) DEVICE — GAUZE SPONGE 4X4 12PLY

## (undated) DEVICE — DRESSING XEROFORM FOIL PK 1X8

## (undated) DEVICE — GLOVE BIOGEL SKINSENSE PI 7.0

## (undated) DEVICE — CORD FOR BIPOLAR FORCEPS 12

## (undated) DEVICE — DRESSING XEROFORM 1X8IN

## (undated) DEVICE — DRAPE C-ARM MINI DISP

## (undated) DEVICE — WRAP SELF ADH. COBAN 4X5YD